# Patient Record
Sex: FEMALE | Race: BLACK OR AFRICAN AMERICAN | NOT HISPANIC OR LATINO
[De-identification: names, ages, dates, MRNs, and addresses within clinical notes are randomized per-mention and may not be internally consistent; named-entity substitution may affect disease eponyms.]

---

## 2017-02-16 ENCOUNTER — APPOINTMENT (OUTPATIENT)
Dept: OPHTHALMOLOGY | Facility: CLINIC | Age: 82
End: 2017-02-16

## 2017-02-16 ENCOUNTER — OUTPATIENT (OUTPATIENT)
Dept: OUTPATIENT SERVICES | Facility: HOSPITAL | Age: 82
LOS: 1 days | End: 2017-02-16

## 2017-02-16 DIAGNOSIS — H26.491 OTHER SECONDARY CATARACT, RIGHT EYE: ICD-10-CM

## 2019-07-30 ENCOUNTER — APPOINTMENT (OUTPATIENT)
Dept: HEART AND VASCULAR | Facility: CLINIC | Age: 84
End: 2019-07-30
Payer: MEDICARE

## 2019-07-30 ENCOUNTER — APPOINTMENT (OUTPATIENT)
Dept: HEART AND VASCULAR | Facility: CLINIC | Age: 84
End: 2019-07-30

## 2019-07-30 VITALS
OXYGEN SATURATION: 97 % | TEMPERATURE: 98.7 F | HEIGHT: 60 IN | BODY MASS INDEX: 23.17 KG/M2 | WEIGHT: 118.01 LBS | HEART RATE: 66 BPM | RESPIRATION RATE: 12 BRPM | DIASTOLIC BLOOD PRESSURE: 64 MMHG | SYSTOLIC BLOOD PRESSURE: 124 MMHG

## 2019-07-30 DIAGNOSIS — I34.0 NONRHEUMATIC MITRAL (VALVE) INSUFFICIENCY: ICD-10-CM

## 2019-07-30 DIAGNOSIS — I65.29 OCCLUSION AND STENOSIS OF UNSPECIFIED CAROTID ARTERY: ICD-10-CM

## 2019-07-30 DIAGNOSIS — Z01.419 ENCOUNTER FOR GYNECOLOGICAL EXAMINATION (GENERAL) (ROUTINE) W/OUT ABNORMAL FINDINGS: ICD-10-CM

## 2019-07-30 DIAGNOSIS — I10 ESSENTIAL (PRIMARY) HYPERTENSION: ICD-10-CM

## 2019-07-30 PROCEDURE — 93000 ELECTROCARDIOGRAM COMPLETE: CPT

## 2019-07-30 PROCEDURE — 93306 TTE W/DOPPLER COMPLETE: CPT

## 2019-07-30 PROCEDURE — 93880 EXTRACRANIAL BILAT STUDY: CPT

## 2019-07-30 PROCEDURE — 99204 OFFICE O/P NEW MOD 45 MIN: CPT

## 2019-07-30 RX ORDER — METOPROLOL TARTRATE 25 MG/1
25 TABLET, FILM COATED ORAL DAILY
Qty: 30 | Refills: 0 | Status: ACTIVE | COMMUNITY

## 2019-07-30 NOTE — PHYSICAL EXAM
[General Appearance - Well Developed] : well developed [Normal Appearance] : normal appearance [Well Groomed] : well groomed [General Appearance - Well Nourished] : well nourished [No Deformities] : no deformities [General Appearance - In No Acute Distress] : no acute distress [Normal Conjunctiva] : the conjunctiva exhibited no abnormalities [Heart Rate And Rhythm] : heart rate and rhythm were normal [Heart Sounds] : normal S1 and S2 [] : no respiratory distress [Exaggerated Use Of Accessory Muscles For Inspiration] : no accessory muscle use [Respiration, Rhythm And Depth] : normal respiratory rhythm and effort [Auscultation Breath Sounds / Voice Sounds] : lungs were clear to auscultation bilaterally [Bowel Sounds] : normal bowel sounds [Abdomen Soft] : soft [Abnormal Walk] : normal gait [Skin Turgor] : normal skin turgor [Oriented To Time, Place, And Person] : oriented to person, place, and time [Affect] : the affect was normal [Mood] : the mood was normal [No Anxiety] : not feeling anxious

## 2019-07-30 NOTE — DISCUSSION/SUMMARY
[FreeTextEntry1] : At the time of the patient's visit a Carotid Doppler was performed to evaluate for PVD\par \par At the time of the patient's visit an Echocardiogram was performed to evaluate her LV function. 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 I would suggest consideration of an ACE-inhibitor or ARB. Switching from Metoprolol Tartrate 25 mg once daily to 12.5 mg bid or Metoprolol Succinate 25 mg daily may be worth considering as well. A statin may be worthwhile

## 2019-07-30 NOTE — HISTORY OF PRESENT ILLNESS
[FreeTextEntry1] : 88 year female with memory deficits and glaucoma who was noted to have a slow heart beat by a visiting nurse. She comes with her attendant. She denies having any chest pain, SOB, dizziness, palpitations, orthopnea or PND. Her niece reports a HR of 45 BPM when checking her BP

## 2019-07-30 NOTE — REVIEW OF SYSTEMS
Eyes:  No visual changes  ENMT:  No hearing changes  Cardiac:  No chest pain, SOB   Respiratory:  No cough or respiratory distress  GI:  No nausea, vomiting, diarrhea or abdominal pain.  :  No dysuria, frequency or burning.  MS:  right lower back pain as per hpi, no hip pain no other joint pain  Neuro:  No headache  Skin:  No skin rash.   Endocrine: No history of thyroid disease or diabetes. [Negative] : Gastrointestinal

## 2019-09-19 ENCOUNTER — APPOINTMENT (OUTPATIENT)
Dept: HEART AND VASCULAR | Facility: CLINIC | Age: 84
End: 2019-09-19

## 2020-03-24 ENCOUNTER — EMERGENCY (EMERGENCY)
Facility: HOSPITAL | Age: 85
LOS: 1 days | Discharge: HOME | End: 2020-03-24
Attending: EMERGENCY MEDICINE | Admitting: EMERGENCY MEDICINE
Payer: MEDICARE

## 2020-03-24 VITALS
OXYGEN SATURATION: 97 % | HEART RATE: 65 BPM | DIASTOLIC BLOOD PRESSURE: 78 MMHG | RESPIRATION RATE: 16 BRPM | TEMPERATURE: 98 F | SYSTOLIC BLOOD PRESSURE: 124 MMHG

## 2020-03-24 VITALS
DIASTOLIC BLOOD PRESSURE: 75 MMHG | TEMPERATURE: 98 F | SYSTOLIC BLOOD PRESSURE: 129 MMHG | OXYGEN SATURATION: 97 % | HEART RATE: 68 BPM | RESPIRATION RATE: 17 BRPM

## 2020-03-24 DIAGNOSIS — M25.512 PAIN IN LEFT SHOULDER: ICD-10-CM

## 2020-03-24 DIAGNOSIS — I10 ESSENTIAL (PRIMARY) HYPERTENSION: ICD-10-CM

## 2020-03-24 PROCEDURE — 99283 EMERGENCY DEPT VISIT LOW MDM: CPT

## 2020-03-24 PROCEDURE — 99284 EMERGENCY DEPT VISIT MOD MDM: CPT

## 2020-03-24 PROCEDURE — 93010 ELECTROCARDIOGRAM REPORT: CPT

## 2020-03-24 PROCEDURE — 93005 ELECTROCARDIOGRAM TRACING: CPT

## 2020-03-24 RX ORDER — IBUPROFEN 200 MG
600 TABLET ORAL ONCE
Refills: 0 | Status: COMPLETED | OUTPATIENT
Start: 2020-03-24 | End: 2020-03-24

## 2020-03-24 RX ORDER — IBUPROFEN 200 MG
1 TABLET ORAL
Qty: 21 | Refills: 0
Start: 2020-03-24 | End: 2020-03-30

## 2020-03-24 RX ADMIN — Medication 600 MILLIGRAM(S): at 12:35

## 2020-03-24 NOTE — ED PROVIDER NOTE - NSFOLLOWUPINSTRUCTIONS_ED_ALL_ED_FT
Shoulder Pain  Many things can cause shoulder pain, including:  An injury to the shoulder.Overuse of the shoulder.Arthritis.The source of the pain can be:  Inflammation.An injury to the shoulder joint.An injury to a tendon, ligament, or bone.Follow these instructions at home:  Pay attention to changes in your symptoms. Let your health care provider know about them. Follow these instructions to relieve your pain.  If you have a sling:     Wear the sling as told by your health care provider. Remove it only as told by your health care provider. Loosen the sling if your fingers tingle, become numb, or turn cold and blue.Keep the sling clean.If the sling is not waterproof:  Do not let it get wet. Remove it to shower or bathe. Move your arm as little as possible, but keep your hand moving to prevent swelling.Managing pain, stiffness, and swelling        If directed, put ice on the painful area:  Put ice in a plastic bag.Place a towel between your skin and the bag.Leave the ice on for 20 minutes, 2–3 times per day. Stop applying ice if it does not help with the pain.Squeeze a soft ball or a foam pad as much as possible. This helps to keep the shoulder from swelling. It also helps to strengthen the arm.General instructions     Take over-the-counter and prescription medicines only as told by your health care provider.Keep all follow-up visits as told by your health care provider. This is important.Contact a health care provider if:  Your pain gets worse.Your pain is not relieved with medicines.New pain develops in your arm, hand, or fingers.Get help right away if:  Your arm, hand, or fingers:  Tingle.Become numb.Become swollen.Become painful.Turn white or blue.Summary  Shoulder pain can be caused by an injury, overuse, or arthritis.Pay attention to changes in your symptoms. Let your health care provider know about them.This condition may be treated with a sling, ice, and pain medicines.Contact your health care provider if the pain gets worse or new pain develops. Get help right away if your arm, hand, or fingers tingle or become numb, swollen, or painful.Keep all follow-up visits as told by your health care provider. This is important.This information is not intended to replace advice given to you by your health care provider. Make sure you discuss any questions you have with your health care provider.    Document Released: 09/27/2006 Document Revised: 07/02/2019 Document Reviewed: 07/02/2019  Elsevier Interactive Patient Education © 2020 [employer]

## 2020-03-24 NOTE — ED PROVIDER NOTE - CLINICAL SUMMARY MEDICAL DECISION MAKING FREE TEXT BOX
88 y/o F with a PMHx of HTN who is right hand dominant is present with atraumatic left arm pain x2 weeks. Pt given Motrin for pain relief. Ordered EKG, will reassess. 89 y/o F with a PMHx of HTN who is right hand dominant is present with atraumatic left arm pain x2 weeks. Pt given Motrin for pain relief. doubt acs, ekg with nsr without cardiac symptoms. no fall/injury, doubt fx, likely msk vs arthritis vs shoulder bursitis with mild pain with range of motion. Treated with motrin with improvement.

## 2020-03-24 NOTE — ED ADULT NURSE NOTE - NSIMPLEMENTINTERV_GEN_ALL_ED
Implemented All Fall with Harm Risk Interventions:  [place] to call system. Call bell, personal items and telephone within reach. Instruct patient to call for assistance. Room bathroom lighting operational. Non-slip footwear when patient is off stretcher. Physically safe environment: no spills, clutter or unnecessary equipment. Stretcher in lowest position, wheels locked, appropriate side rails in place. Provide visual cue, wrist band, yellow gown, etc. Monitor gait and stability. Monitor for mental status changes and reorient to person, place, and time. Review medications for side effects contributing to fall risk. Reinforce activity limits and safety measures with patient and family. Provide visual clues: red socks.

## 2020-03-24 NOTE — ED PROVIDER NOTE - OBJECTIVE STATEMENT
89 y/o female with a PMHx of HTN who is right hand dominant is present with atraumatic left arm pain x2 weeks. Pt states the pain is located on the joints of her left arm. It is located on the wrist, elbow and shoulder. Pain is mild at rest, however increases with movement. She describes a constant ache. She self-medicated with tylenol without improvement of her pain. She denies the following: fever, chills, chest pain, sob, difficulty breathing, back pain, fall, injury, weakness.

## 2020-03-24 NOTE — ED PROVIDER NOTE - PATIENT PORTAL LINK FT
You can access the FollowMyHealth Patient Portal offered by [facility] by registering at the following website: [email or web address]. By joining [employer]’s FollowMyHealth portal, you will also be able to view your health information using other applications (apps) compatible with our system.

## 2020-03-24 NOTE — ED ADULT TRIAGE NOTE - OTHER COMPLAINTS
pt presents s/p fall on sun. denies head trauma. c.o L shoulder pain with limited rom. aide is waiting in the waiting room.

## 2020-03-24 NOTE — ED ADULT NURSE NOTE - OBJECTIVE STATEMENT
pt 89 y/o female arrived to ED for chief complaint of left shoulder pain. Pt arrived with HHA. Pt states she fell  2 days ago,  denied loc or head trauma. pt exacerbated with movement.

## 2020-03-24 NOTE — ED PROVIDER NOTE - LOCATION
L arm shoulder/GOOD ROM, SOFT COMPARTMENT, MOTOR FUNCTION AND NEURO INTACT WITH 2+ PULSE AND GOOD CAP REFILL WITH + BEER CAN SIGNS, NEGATIVE WALLER

## 2020-03-24 NOTE — ED PROVIDER NOTE - ATTENDING CONTRIBUTION TO CARE
88 y/o F with PMH of HTN, right hand dominant, presents with atraumatic left arm pain x2 weeks. Pt states the pain is located on the joints of her left arm in the wrist, elbow and shoulder. Pain is mild at rest, however increases with movement and she describes a constant ache. She self-medicated with tylenol without improvement of her pain. She denies the following: fever, chills, chest pain, sob, difficulty breathing, back pain, fall, injury, weakness.  88 y/o F with a PMHx of HTN who is right hand dominant is present with atraumatic left arm pain x2 weeks. Pt given Motrin for pain relief. doubt acs, ekg with nsr without cardiac symptoms. no fall/injury, doubt fx, likely msk vs arthritis vs shoulder bursitis with mild pain with range of motion. Treated with motrin with improvement. 89 y/o F with PMH of HTN, right hand dominant, presents with atraumatic left arm pain x 2 weeks. Pt states the pain is located in the joints of her left arm in the wrist, elbow and shoulder. Pain is mild at rest, but however increases with movement and she describes it as a constant ache. She self-medicated with Tylenol without improvement of her pain. She denies the following: fevers, cough, chills, chest pain, sob, difficulty breathing, back pain, fall, injury, weakness, falls or trauma. Pt AAO, NAD, RRR, CTA b/l, abd: soft and NT. FROM of LUE with some pain, no swelling or bony TTP. Pt given Motrin for pain relief. doubt acs, ekg with nsr, without cardiac symptoms. Sxs likely sec to MSK etiology/ arthritis. To f/up outpt.

## 2020-05-15 ENCOUNTER — EMERGENCY (EMERGENCY)
Facility: HOSPITAL | Age: 85
LOS: 1 days | Discharge: HOME | End: 2020-05-15
Attending: EMERGENCY MEDICINE | Admitting: EMERGENCY MEDICINE
Payer: MEDICARE

## 2020-05-15 VITALS
OXYGEN SATURATION: 100 % | SYSTOLIC BLOOD PRESSURE: 132 MMHG | DIASTOLIC BLOOD PRESSURE: 66 MMHG | HEART RATE: 60 BPM | TEMPERATURE: 100 F | RESPIRATION RATE: 18 BRPM

## 2020-05-15 VITALS
TEMPERATURE: 98 F | DIASTOLIC BLOOD PRESSURE: 70 MMHG | RESPIRATION RATE: 18 BRPM | SYSTOLIC BLOOD PRESSURE: 127 MMHG | OXYGEN SATURATION: 100 % | HEART RATE: 55 BPM

## 2020-05-15 PROCEDURE — 99284 EMERGENCY DEPT VISIT MOD MDM: CPT

## 2020-05-15 PROCEDURE — 99284 EMERGENCY DEPT VISIT MOD MDM: CPT | Mod: 25

## 2020-05-15 PROCEDURE — 93971 EXTREMITY STUDY: CPT | Mod: 26,LT

## 2020-05-15 PROCEDURE — 73502 X-RAY EXAM HIP UNI 2-3 VIEWS: CPT

## 2020-05-15 PROCEDURE — 73502 X-RAY EXAM HIP UNI 2-3 VIEWS: CPT | Mod: 26,LT

## 2020-05-15 PROCEDURE — 93971 EXTREMITY STUDY: CPT

## 2020-05-15 NOTE — ED PROVIDER NOTE - PATIENT PORTAL LINK FT
You can access the FollowMyHealth Patient Portal offered by [facility] by registering at the following website: [email or web address]. By joining [email or web address]’s FollowMyHealth portal, you will also be able to view your health information using other applications (apps) compatible with our system.

## 2020-05-15 NOTE — ED PROVIDER NOTE - MUSCULOSKELETAL, MLM
Spine appears normal, range of motion is not limited. Point tenderness to L hip. No swelling. No deformities. No midline spine tenderness. Strength 5/5 b/l to lower ext. dp/pt pulse 2+

## 2020-05-15 NOTE — ED PROVIDER NOTE - CLINICAL SUMMARY MEDICAL DECISION MAKING FREE TEXT BOX
89 y/o F with PMHx of HTN p/w atraumatic L leg and hip pain x1 week. Able to bare weight. No weakness on exam. Will obtain xray and US to r/o DVT. Plan for out-pt f/u

## 2020-05-15 NOTE — ED ADULT NURSE NOTE - OBJECTIVE STATEMENT
Pt. presents to the ED c/o intermittent pain to the left hip, thigh, and knee x approx 3 weeks. Pt. denies injury or all to the area- Pt. presents with + cmst's to the limb and free and fluid movement. Pt. pending imaging and continues to deny pain at this time.

## 2020-05-15 NOTE — ED PROVIDER NOTE - ATTENDING CONTRIBUTION TO CARE
88 y/o F with PMHx of HTN presents to the ED c/o atraumatic L hip and leg pain x1 week. Pain to the L hip moving to L thigh. No pain to foot. Took Tylenol with some improvements. Denies numbness, weakness, or trauma. Discussed with home health aid  Meka ([phone]). Aid states pt's pain is intermittent having pain one day and no pain in other days. Aid states pt leg is hurting today thus prompting ED visit for eval. As per Aid, pt did not fall.    Agree with HPI and PE as documented by PA    PT with imaging in ED neg for acute dvt or fracture  Pain most likely secondary to arthritis  Ambulating in ED and therefore do not feel ct necessary - also pt without trauma  Discussed with aid who will accompany pt home  Aware of return precautions

## 2020-05-15 NOTE — ED PROVIDER NOTE - OBJECTIVE STATEMENT
89 y/o F with PMHx of HTN presents to the ED c/o atraumatic L hip and leg pain x1 week. Pain to the L hip moving to L thigh. No pain to foot. Took Tylenol with some improvements. Denies numbness, weakness, or trauma. Discussed with home health aid  Meka ([phone]). Aid states pt's pain is intermittent having pain one day and no pain in other days. Aid states pt leg is hurting today thus prompting ED visit for eval. As per Aid, pt did not fall.

## 2020-05-15 NOTE — ED ADULT NURSE NOTE - CINV DISCH TEACH PARTICIP
Caregiver/PA Jennie discussed discharge care with home health-aide in [place]- instructions discussed with pt. by this RN/Patient

## 2020-05-19 DIAGNOSIS — M79.605 PAIN IN LEFT LEG: ICD-10-CM

## 2020-05-19 DIAGNOSIS — M25.552 PAIN IN LEFT HIP: ICD-10-CM

## 2020-05-29 ENCOUNTER — INPATIENT (INPATIENT)
Facility: HOSPITAL | Age: 85
LOS: 3 days | Discharge: NURSING, REHAB OR LONG-TERM CARE | DRG: 682 | End: 2020-06-02
Payer: MEDICARE

## 2020-05-29 VITALS
HEIGHT: 62 IN | TEMPERATURE: 98 F | DIASTOLIC BLOOD PRESSURE: 68 MMHG | OXYGEN SATURATION: 100 % | WEIGHT: 119.93 LBS | HEART RATE: 84 BPM | SYSTOLIC BLOOD PRESSURE: 115 MMHG | RESPIRATION RATE: 17 BRPM

## 2020-05-29 DIAGNOSIS — I34.0 NONRHEUMATIC MITRAL (VALVE) INSUFFICIENCY: ICD-10-CM

## 2020-05-29 DIAGNOSIS — E86.9 VOLUME DEPLETION, UNSPECIFIED: ICD-10-CM

## 2020-05-29 DIAGNOSIS — R79.89 OTHER SPECIFIED ABNORMAL FINDINGS OF BLOOD CHEMISTRY: ICD-10-CM

## 2020-05-29 DIAGNOSIS — R63.8 OTHER SYMPTOMS AND SIGNS CONCERNING FOOD AND FLUID INTAKE: ICD-10-CM

## 2020-05-29 DIAGNOSIS — H40.9 UNSPECIFIED GLAUCOMA: ICD-10-CM

## 2020-05-29 DIAGNOSIS — F03.90 UNSPECIFIED DEMENTIA WITHOUT BEHAVIORAL DISTURBANCE: ICD-10-CM

## 2020-05-29 DIAGNOSIS — R53.1 WEAKNESS: ICD-10-CM

## 2020-05-29 DIAGNOSIS — I10 ESSENTIAL (PRIMARY) HYPERTENSION: ICD-10-CM

## 2020-05-29 LAB
ALBUMIN SERPL ELPH-MCNC: 3.6 G/DL — SIGNIFICANT CHANGE UP (ref 3.3–5)
ALBUMIN SERPL ELPH-MCNC: 3.6 G/DL — SIGNIFICANT CHANGE UP (ref 3.3–5)
ALBUMIN SERPL ELPH-MCNC: 3.7 G/DL — SIGNIFICANT CHANGE UP (ref 3.3–5)
ALP SERPL-CCNC: 52 U/L — SIGNIFICANT CHANGE UP (ref 40–120)
ALP SERPL-CCNC: 52 U/L — SIGNIFICANT CHANGE UP (ref 40–120)
ALP SERPL-CCNC: 54 U/L — SIGNIFICANT CHANGE UP (ref 40–120)
ALT FLD-CCNC: 13 U/L — SIGNIFICANT CHANGE UP (ref 10–45)
ALT FLD-CCNC: SIGNIFICANT CHANGE UP U/L (ref 10–45)
ALT FLD-CCNC: SIGNIFICANT CHANGE UP U/L (ref 10–45)
ANION GAP SERPL CALC-SCNC: 16 MMOL/L — SIGNIFICANT CHANGE UP (ref 5–17)
ANION GAP SERPL CALC-SCNC: 16 MMOL/L — SIGNIFICANT CHANGE UP (ref 5–17)
ANION GAP SERPL CALC-SCNC: 17 MMOL/L — SIGNIFICANT CHANGE UP (ref 5–17)
APPEARANCE UR: CLEAR — SIGNIFICANT CHANGE UP
APTT BLD: 23.7 SEC — LOW (ref 27.5–36.3)
AST SERPL-CCNC: 27 U/L — SIGNIFICANT CHANGE UP (ref 10–40)
AST SERPL-CCNC: SIGNIFICANT CHANGE UP U/L (ref 10–40)
AST SERPL-CCNC: SIGNIFICANT CHANGE UP U/L (ref 10–40)
BACTERIA # UR AUTO: PRESENT /HPF
BASOPHILS # BLD AUTO: 0.04 K/UL — SIGNIFICANT CHANGE UP (ref 0–0.2)
BASOPHILS NFR BLD AUTO: 0.4 % — SIGNIFICANT CHANGE UP (ref 0–2)
BILIRUB SERPL-MCNC: 0.9 MG/DL — SIGNIFICANT CHANGE UP (ref 0.2–1.2)
BILIRUB SERPL-MCNC: 1 MG/DL — SIGNIFICANT CHANGE UP (ref 0.2–1.2)
BILIRUB SERPL-MCNC: 1.1 MG/DL — SIGNIFICANT CHANGE UP (ref 0.2–1.2)
BILIRUB UR-MCNC: NEGATIVE — SIGNIFICANT CHANGE UP
BUN SERPL-MCNC: 39 MG/DL — HIGH (ref 7–23)
BUN SERPL-MCNC: 39 MG/DL — HIGH (ref 7–23)
BUN SERPL-MCNC: 40 MG/DL — HIGH (ref 7–23)
CALCIUM SERPL-MCNC: 10 MG/DL — SIGNIFICANT CHANGE UP (ref 8.4–10.5)
CALCIUM SERPL-MCNC: 10 MG/DL — SIGNIFICANT CHANGE UP (ref 8.4–10.5)
CALCIUM SERPL-MCNC: 10.3 MG/DL — SIGNIFICANT CHANGE UP (ref 8.4–10.5)
CHLORIDE SERPL-SCNC: 105 MMOL/L — SIGNIFICANT CHANGE UP (ref 96–108)
CHLORIDE SERPL-SCNC: 107 MMOL/L — SIGNIFICANT CHANGE UP (ref 96–108)
CHLORIDE SERPL-SCNC: 108 MMOL/L — SIGNIFICANT CHANGE UP (ref 96–108)
CK MB CFR SERPL CALC: 6.3 NG/ML — SIGNIFICANT CHANGE UP (ref 0–6.7)
CK SERPL-CCNC: 355 U/L — HIGH (ref 25–170)
CK SERPL-CCNC: 417 U/L — HIGH (ref 25–170)
CO2 SERPL-SCNC: 17 MMOL/L — LOW (ref 22–31)
CO2 SERPL-SCNC: 19 MMOL/L — LOW (ref 22–31)
CO2 SERPL-SCNC: 19 MMOL/L — LOW (ref 22–31)
COLOR SPEC: YELLOW — SIGNIFICANT CHANGE UP
CREAT SERPL-MCNC: 1.75 MG/DL — HIGH (ref 0.5–1.3)
CREAT SERPL-MCNC: 1.76 MG/DL — HIGH (ref 0.5–1.3)
CREAT SERPL-MCNC: 1.82 MG/DL — HIGH (ref 0.5–1.3)
DIFF PNL FLD: NEGATIVE — SIGNIFICANT CHANGE UP
EOSINOPHIL # BLD AUTO: 0.04 K/UL — SIGNIFICANT CHANGE UP (ref 0–0.5)
EOSINOPHIL NFR BLD AUTO: 0.4 % — SIGNIFICANT CHANGE UP (ref 0–6)
EPI CELLS # UR: SIGNIFICANT CHANGE UP /HPF (ref 0–5)
GLUCOSE SERPL-MCNC: 112 MG/DL — HIGH (ref 70–99)
GLUCOSE SERPL-MCNC: 114 MG/DL — HIGH (ref 70–99)
GLUCOSE SERPL-MCNC: 115 MG/DL — HIGH (ref 70–99)
GLUCOSE UR QL: NEGATIVE — SIGNIFICANT CHANGE UP
HCT VFR BLD CALC: 42.3 % — SIGNIFICANT CHANGE UP (ref 34.5–45)
HGB BLD-MCNC: 13.9 G/DL — SIGNIFICANT CHANGE UP (ref 11.5–15.5)
HYALINE CASTS # UR AUTO: ABNORMAL /LPF (ref 0–2)
IMM GRANULOCYTES NFR BLD AUTO: 0.6 % — SIGNIFICANT CHANGE UP (ref 0–1.5)
INR BLD: 1.03 — SIGNIFICANT CHANGE UP (ref 0.88–1.16)
KETONES UR-MCNC: ABNORMAL MG/DL
LEUKOCYTE ESTERASE UR-ACNC: NEGATIVE — SIGNIFICANT CHANGE UP
LYMPHOCYTES # BLD AUTO: 1.25 K/UL — SIGNIFICANT CHANGE UP (ref 1–3.3)
LYMPHOCYTES # BLD AUTO: 12.1 % — LOW (ref 13–44)
MAGNESIUM SERPL-MCNC: 2.2 MG/DL — SIGNIFICANT CHANGE UP (ref 1.6–2.6)
MCHC RBC-ENTMCNC: 30.5 PG — SIGNIFICANT CHANGE UP (ref 27–34)
MCHC RBC-ENTMCNC: 32.9 GM/DL — SIGNIFICANT CHANGE UP (ref 32–36)
MCV RBC AUTO: 93 FL — SIGNIFICANT CHANGE UP (ref 80–100)
MONOCYTES # BLD AUTO: 0.44 K/UL — SIGNIFICANT CHANGE UP (ref 0–0.9)
MONOCYTES NFR BLD AUTO: 4.2 % — SIGNIFICANT CHANGE UP (ref 2–14)
NEUTROPHILS # BLD AUTO: 8.53 K/UL — HIGH (ref 1.8–7.4)
NEUTROPHILS NFR BLD AUTO: 82.3 % — HIGH (ref 43–77)
NITRITE UR-MCNC: NEGATIVE — SIGNIFICANT CHANGE UP
NRBC # BLD: 0 /100 WBCS — SIGNIFICANT CHANGE UP (ref 0–0)
PH UR: 6 — SIGNIFICANT CHANGE UP (ref 5–8)
PLATELET # BLD AUTO: 160 K/UL — SIGNIFICANT CHANGE UP (ref 150–400)
POTASSIUM SERPL-MCNC: 5.3 MMOL/L — SIGNIFICANT CHANGE UP (ref 3.5–5.3)
POTASSIUM SERPL-MCNC: SIGNIFICANT CHANGE UP MMOL/L (ref 3.5–5.3)
POTASSIUM SERPL-MCNC: SIGNIFICANT CHANGE UP MMOL/L (ref 3.5–5.3)
POTASSIUM SERPL-SCNC: 5.3 MMOL/L — SIGNIFICANT CHANGE UP (ref 3.5–5.3)
POTASSIUM SERPL-SCNC: SIGNIFICANT CHANGE UP MMOL/L (ref 3.5–5.3)
POTASSIUM SERPL-SCNC: SIGNIFICANT CHANGE UP MMOL/L (ref 3.5–5.3)
PROT SERPL-MCNC: 7.3 G/DL — SIGNIFICANT CHANGE UP (ref 6–8.3)
PROT UR-MCNC: ABNORMAL MG/DL
PROTHROM AB SERPL-ACNC: 11.8 SEC — SIGNIFICANT CHANGE UP (ref 10–12.9)
RBC # BLD: 4.55 M/UL — SIGNIFICANT CHANGE UP (ref 3.8–5.2)
RBC # FLD: 13.3 % — SIGNIFICANT CHANGE UP (ref 10.3–14.5)
RBC CASTS # UR COMP ASSIST: < 5 /HPF — SIGNIFICANT CHANGE UP
SARS-COV-2 RNA SPEC QL NAA+PROBE: SIGNIFICANT CHANGE UP
SODIUM SERPL-SCNC: 141 MMOL/L — SIGNIFICANT CHANGE UP (ref 135–145)
SODIUM SERPL-SCNC: 141 MMOL/L — SIGNIFICANT CHANGE UP (ref 135–145)
SODIUM SERPL-SCNC: 142 MMOL/L — SIGNIFICANT CHANGE UP (ref 135–145)
SP GR SPEC: >=1.03 — SIGNIFICANT CHANGE UP (ref 1–1.03)
TROPONIN T SERPL-MCNC: 0.02 NG/ML — HIGH (ref 0–0.01)
TROPONIN T SERPL-MCNC: <0.01 NG/ML — SIGNIFICANT CHANGE UP (ref 0–0.01)
UROBILINOGEN FLD QL: 0.2 E.U./DL — SIGNIFICANT CHANGE UP
WBC # BLD: 10.36 K/UL — SIGNIFICANT CHANGE UP (ref 3.8–10.5)
WBC # FLD AUTO: 10.36 K/UL — SIGNIFICANT CHANGE UP (ref 3.8–10.5)
WBC UR QL: < 5 /HPF — SIGNIFICANT CHANGE UP

## 2020-05-29 PROCEDURE — 72170 X-RAY EXAM OF PELVIS: CPT | Mod: 26

## 2020-05-29 PROCEDURE — 71045 X-RAY EXAM CHEST 1 VIEW: CPT | Mod: 26,59

## 2020-05-29 PROCEDURE — 99223 1ST HOSP IP/OBS HIGH 75: CPT | Mod: GC

## 2020-05-29 PROCEDURE — 71046 X-RAY EXAM CHEST 2 VIEWS: CPT | Mod: 26

## 2020-05-29 PROCEDURE — 93010 ELECTROCARDIOGRAM REPORT: CPT

## 2020-05-29 PROCEDURE — 99285 EMERGENCY DEPT VISIT HI MDM: CPT

## 2020-05-29 PROCEDURE — 70450 CT HEAD/BRAIN W/O DYE: CPT | Mod: 26

## 2020-05-29 RX ORDER — METOPROLOL TARTRATE 50 MG
25 TABLET ORAL DAILY
Refills: 0 | Status: DISCONTINUED | OUTPATIENT
Start: 2020-05-29 | End: 2020-05-29

## 2020-05-29 RX ORDER — SODIUM CHLORIDE 9 MG/ML
1000 INJECTION INTRAMUSCULAR; INTRAVENOUS; SUBCUTANEOUS
Refills: 0 | Status: DISCONTINUED | OUTPATIENT
Start: 2020-05-29 | End: 2020-05-30

## 2020-05-29 RX ORDER — ENOXAPARIN SODIUM 100 MG/ML
30 INJECTION SUBCUTANEOUS EVERY 24 HOURS
Refills: 0 | Status: DISCONTINUED | OUTPATIENT
Start: 2020-05-29 | End: 2020-06-02

## 2020-05-29 RX ORDER — SODIUM CHLORIDE 9 MG/ML
1000 INJECTION INTRAMUSCULAR; INTRAVENOUS; SUBCUTANEOUS
Refills: 0 | Status: DISCONTINUED | OUTPATIENT
Start: 2020-05-29 | End: 2020-05-29

## 2020-05-29 RX ORDER — METOPROLOL TARTRATE 50 MG
25 TABLET ORAL
Qty: 0 | Refills: 0 | DISCHARGE

## 2020-05-29 RX ADMIN — ENOXAPARIN SODIUM 30 MILLIGRAM(S): 100 INJECTION SUBCUTANEOUS at 21:48

## 2020-05-29 RX ADMIN — SODIUM CHLORIDE 70 MILLILITER(S): 9 INJECTION INTRAMUSCULAR; INTRAVENOUS; SUBCUTANEOUS at 19:38

## 2020-05-29 RX ADMIN — SODIUM CHLORIDE 120 MILLILITER(S): 9 INJECTION INTRAMUSCULAR; INTRAVENOUS; SUBCUTANEOUS at 14:09

## 2020-05-29 NOTE — H&P ADULT - PROBLEM SELECTOR PLAN 3
per HHA has been progressive over unclear amount of time  no reported falls/trauma  5/5 strength in all extremities   PT consult in AM

## 2020-05-29 NOTE — PHYSICAL THERAPY INITIAL EVALUATION ADULT - CRITERIA FOR SKILLED THERAPEUTIC INTERVENTIONS
therapy frequency/risk reduction/prevention/anticipated discharge recommendation/impairments found/functional limitations in following categories/rehab potential

## 2020-05-29 NOTE — H&P ADULT - PROBLEM SELECTOR PLAN 10
F: NS at 70 x 5 hours  N: pureed   DVT: lovenox  FULL CODE (unable to get answer from pt but niece states patient wants to live to 100 is unaware of any DNR/DNI)  DISPO: [facility]

## 2020-05-29 NOTE — ED PROVIDER NOTE - NEUROLOGICAL, MLM
Alert and oriented, no focal deficits, no motor or sensory deficits. CN'II-XII intact, no pronator drift, nl finger to nose, clear speech.

## 2020-05-29 NOTE — ED PROVIDER NOTE - OBJECTIVE STATEMENT
Pt is an 89 y/o F sent to the ED after she was found on her knees by a healthcare aide when she came in to the home this morning. The aide was concerned the pt might have fallen. When pt was asked why she was on her knees, she reports she was saying her daily prayers on her knees, which she does every morning. Denies fall. Pt reports feeling well. Pt reports h/o chronic dizziness w/ walking for the past 6 years. Also reports h/o chronic bilateral shoulder tightness but is unsure of how long she has had this. Denies headache, cough, chest pain, SOB, nausea, vomiting, or diarrhea. Pt is an 88 y/o F sent to the ED after she was found on her knees by a healthcare aide when she came in to the home this morning with inability to get up. The aide was concerned the pt might have fallen. When pt was asked why she was on her knees, she reports she was saying her daily prayers on her knees, which she does every morning. Denies fall. Pt reports feeling well. Pt reports h/o chronic dizziness w/ walking for the past 6 years. Also reports h/o chronic bilateral shoulder tightness but is unsure of how long she has had this. Denies headache, cough, chest pain, SOB, nausea, vomiting, or diarrhea. pt unsure if she has been eating and drinking well lately, endorses dry mouth

## 2020-05-29 NOTE — H&P ADULT - ASSESSMENT
88 y/o F PMH moderate MR, mild dementia, glaucoma who presented with generalized weakness, admitted for hydration and assessment of safety of living alone.

## 2020-05-29 NOTE — H&P ADULT - HISTORY OF PRESENT ILLNESS
88 y/o F PMH moderate MR, mild dementia who presented from home after her aide found her on her knees and patient had difficulty getting up. Patient states she was in daily prayer and denies fall/trauma. 89 y/o F PMH moderate MR, mild dementia who presented from home after her aide found her on her knees and patient had difficulty getting up. Patient states she was in daily prayer and denies fall/trauma. Patient states she takes one pill for blood pressure but does not know the name. Does not feel weak or ill at this time. Limited ability to interview due to dementia but cannot elicit any complaints. Home health aide that found her is not 89 y/o F PMH moderate MR, mild dementia, HTN who presented from home after her aide found her on her knees and patient had difficulty getting up. Patient states she was in daily prayer and denies fall/trauma. Patient states she takes one pill for blood pressure but does not know the name. Does not feel weak or ill at this time. Limited ability to interview due to dementia but cannot elicit any complaints. Was able to speak to home health aide (Ms. Houser, [phone]) who states the patient has been getting progressively weaker and more demented over time but no specific illness/events.    ED vitals: afebrile, p 84, 115/68, RR 17, 100% on RA  ED labs: Cr 1.82, BUN 39, trop .02 -> .01, trace ketones in urine  CT head: no acute findings  CXR: Bilateral interstitial lung disease  Started on NS @ 120 88 y/o F PMH moderate MR, mild dementia, HTN who presented from home after her aide found her on her knees and patient had difficulty getting up. Patient states she was in daily prayer and denies fall/trauma. Patient states she takes one pill for blood pressure but does not know the name. Does not feel weak or ill at this time. Limited ability to interview due to dementia but cannot elicit any complaints. Was able to speak to home health aide (Ms. Houser, [phone]) who states the patient has been getting progressively weaker and more demented over time but no specific illness/events.    ED vitals: afebrile, p 84, 115/68, RR 17, 100% on RA  ED labs: Cr 1.82, BUN 39, trop .02 -> .01, trace ketones in urine  CT head: no acute findings  CXR: Bilateral interstitial lung disease  EKG: NSR at 68  Started on NS @ 120

## 2020-05-29 NOTE — ED PROVIDER NOTE - PMH
PPD placed and confirm can return on 10/4 for reading  Continue rescue inhaler as pretreatment before going running as you have been doing well with this  We did review importance of getting flu vaccine and October being the ideal month  You are aware you can come for a nurse visit, during or flu clinics or you may receive at your pharmacy 
Glaucoma    Hypertension    Shingles

## 2020-05-29 NOTE — H&P ADULT - PROBLEM SELECTOR PLAN 5
moderate per echo in allscripts 2019  increases risk for fluid overload -- monitor volume status closely .02 on admission --> .01  no chest pain or ischemic changes on ekg  may be in setting of renal insuffiency

## 2020-05-29 NOTE — H&P ADULT - PROBLEM SELECTOR PLAN 8
from talking to niece/ HHA appears to be at baseline  CT no acute findings  AO x 2-3  B12, TSH in AM from talking to niece/ HHA appears to be at baseline  CT no acute findings -- chronic microvascular disease/ chronic cerebellar infarct  AO x 2-3  B12, TSH in AM

## 2020-05-29 NOTE — H&P ADULT - PROBLEM SELECTOR PLAN 4
home med metoprolol tartrate 25 mg daily   unclear reason patient on this med given minimal effect on BP and not typically dosed daily  will continue to avoid rebound tachycardia and obtain collateral if possible home med metoprolol tartrate 25 mg daily   unclear reason patient on this med given minimal effect on BP and not typically dosed daily  hold for now, will check orthostatics and monitor BP/HR

## 2020-05-29 NOTE — ED PROVIDER NOTE - CONSTITUTIONAL, MLM
normal... Elderly appearing, awake, alert, oriented to person, place, time/situation and in no apparent distress. Elderly appearing, awake, alert, oriented to person, place disoriented to year ,  and in no apparent distress.

## 2020-05-29 NOTE — H&P ADULT - NSHPLABSRESULTS_GEN_ALL_CORE
LABS:                         13.9   10.36 )-----------( 160      ( 29 May 2020 10:47 )             42.3     05-29    142  |  107  |  40<H>  ----------------------------<  115<H>  5.3   |  19<L>  |  1.76<H>    Ca    10.0      29 May 2020 14:48  Mg     2.2     05-29    TPro  7.3  /  Alb  3.7  /  TBili  1.0  /  DBili  x   /  AST  27  /  ALT  13  /  AlkPhos  54  05-29    PT/INR - ( 29 May 2020 10:47 )   PT: 11.8 sec;   INR: 1.03          PTT - ( 29 May 2020 10:47 )  PTT:23.7 sec  Urinalysis Basic - ( 29 May 2020 13:09 )    Color: Yellow / Appearance: Clear / SG: >=1.030 / pH: x  Gluc: x / Ketone: Trace mg/dL  / Bili: Negative / Urobili: 0.2 E.U./dL   Blood: x / Protein: Trace mg/dL / Nitrite: NEGATIVE   Leuk Esterase: NEGATIVE / RBC: < 5 /HPF / WBC < 5 /HPF   Sq Epi: x / Non Sq Epi: 0-5 /HPF / Bacteria: Present /HPF      CARDIAC MARKERS ( 29 May 2020 13:33 )  x     / <0.01 ng/mL / x     / x     / x      CARDIAC MARKERS ( 29 May 2020 10:47 )  x     / 0.02 ng/mL / 417 U/L / x     / 6.3 ng/mL            RADIOLOGY, EKG & ADDITIONAL TESTS: Reviewed.

## 2020-05-29 NOTE — ED PROVIDER NOTE - PSYCHIATRIC, MLM
Alert and oriented to person, place, time/situation. normal mood and affect. no apparent risk to self or others. Alert and oriented to person, place,disoriented to year . normal mood and affect. no apparent risk to self or others.

## 2020-05-29 NOTE — H&P ADULT - NSICDXPASTSURGICALHX_GEN_ALL_CORE_FT
PAST SURGICAL HISTORY:  No significant past surgical history PAST SURGICAL HISTORY:  H/O eye surgery

## 2020-05-29 NOTE — ED ADULT NURSE NOTE - OBJECTIVE STATEMENT
Pt presents to ER with c/o found kneeling on ground this AM about 0800 by HHA. In ER, pt denies any physical complaints. Pt states she was kneeling to pray this morning and that she does this often. Pt denies difficulty breathing, CP, extremity pain, HA, nausea, vomiting. Pt is blind. Pt does complain that for 6 years she has been feeling dizziness when she sits up/walks. Pt appears dry via oral mucosa. Pt currently is oriented to person and place; pt is not oriented to time. PMH dementia.

## 2020-05-29 NOTE — H&P ADULT - PROBLEM SELECTOR PLAN 1
per HHA has not been eating much recently   per ED provider appeared dry on exam although now with MMM  also with trace ketones in urine and high specific gravity  gentle hydration   nutrition consult in AM (patient does not have dentures with her) per HHA has not been eating much recently   per ED provider appeared dry on exam although now with MMM  also with trace ketones in urine and high specific gravity  gentle hydration   nutrition consult in AM (patient does not have dentures with her)  check orthostatics

## 2020-05-29 NOTE — PHYSICAL THERAPY INITIAL EVALUATION ADULT - ORIENTATION, REHAB EVAL
Oriented to self but cannot accurately state the year she was born, knows she is at [facility] and why she is here. Knows the president is Chasidy and the month is May, though cannot state the year.

## 2020-05-29 NOTE — PHYSICAL THERAPY INITIAL EVALUATION ADULT - PERTINENT HX OF CURRENT PROBLEM, REHAB EVAL
89 year old female who presented from home after her aide found her on her knees and patient had difficulty getting up. Patient states she was in daily prayer and denies fall/trauma.

## 2020-05-29 NOTE — ED PROVIDER NOTE - ATTESTATION, MLM
Elvia TENA at bedside for initial admission assessment. Patient's daughter-in-law remains at beside. Patient placed on 2L supplemental O2 via NC per order. 1:53 PM  Cardiologist MD at bedside. Patient remains very restless, patient instructed to stay still and focus on good respirations. 2:02 PM  Echo at bedside. Additional PIV inserted by Brian CANO. 2:30 PM  Consent for cardiac catheterization completed and scanned into chart by ER . Daughter-in-law left bedside. Daughter-in-law asked to calm down as she is increasing patient's anxiety level. 2:40 PM  Patient to go to Cath Lab shortly, accepting [place], informed. I have reviewed and confirmed nurses' notes for patient's medications, allergies, medical history, and surgical history.

## 2020-05-29 NOTE — ED PROVIDER NOTE - CLINICAL SUMMARY MEDICAL DECISION MAKING FREE TEXT BOX
89 y/o F presents to the ED after being found on her knees this morning. Reports she was praying and did not fall. Suspect pt is likely accurate, however pt does have a h/o dementia. Will evaluate further for trauma, metabolic abnormality, or infection. Dispo pending workup and reevaluation. 88 y/o F presents to the ED after being found on her knees this morning. Reports she was praying and did not fall. Suspect pt is likely accurate, however pt does have a h/o dementia. Will evaluate further for trauma, metabolic abnormality, or infection. Dispo pending workup and reevaluation.    Reeval, creatinine 1.8 , consider ARF although do not have baseline creatinine, pt clinically dehydrated appearing, will need admission for rehydration, slow fluids ordered due to age, CMP repeat pending, initial hemolyzed, UA pending. no apparent injury on imaging , 89 y/o F presents to the ED after being found on her knees this morning. Reports she was praying and did not fall. Suspect pt is likely accurate, however pt does have a h/o dementia. Will evaluate further for trauma, metabolic abnormality, or infection. Dispo pending workup and reevaluation.    Reeval, creatinine 1.8 , consider ARF although do not have baseline creatinine, pt clinically dehydrated appearing, will need admission for rehydration, also reeval of home situation as reportedly pt only with day coverage and otherwise lives alone, pt with dementia slow fluids ordered due to age, CMP repeat pending, initial hemolyzed, UA pending. no apparent injury on imaging ,

## 2020-05-29 NOTE — ED ADULT NURSE REASSESSMENT NOTE - NS ED NURSE REASSESS COMMENT FT1
Handoff report received for primary RN break. Patient currently sleeping in stretcher, arousable by verbal stimulation, patient in no acute distress. Will continue to monitor patient.

## 2020-05-29 NOTE — H&P ADULT - PROBLEM SELECTOR PLAN 6
F: NS at 120  N: pureed   DVT: lovenox  FULL CODE (unable to get answer from pt but niece states patient wants to live to 89 is unaware of any DNR/DNI)  DISPO: [facility] moderate per echo in allscripts 2019  increases risk for fluid overload -- monitor volume status closely moderate per echo in allscripts 2019  increases risk for fluid overload -- monitor volume status closely  repeat echo to assess

## 2020-05-29 NOTE — H&P ADULT - PROBLEM SELECTOR PLAN 2
unknown baseline, too late to call PCP  CAYLA versus CKD 4  daily BMP, seems to be downtrending with fluids supporting pre-renal CAYLA unknown baseline, too late to call PCP  CAYLA versus CKD 4  daily BMP, seems to be downtrending with fluids supporting pre-renal CAYLA    ADDENDUM: Cr 1.17 in 2017; likely CAYLA, monitor renal function , send urine lytes

## 2020-05-29 NOTE — PHYSICAL THERAPY INITIAL EVALUATION ADULT - DIAGNOSIS, PT EVAL
4C: Impaired Muscle Performance; 5A: Primary Prevention/Risk Reduction for Loss of Balance and Falling

## 2020-05-29 NOTE — ED PROVIDER NOTE - ENMT, MLM
Airway patent, Nasal mucosa clear. Dry mucous membranes. Throat has no vesicles, no oropharyngeal exudates and uvula is midline. Airway patent, Nasal mucosa clear. DRY mucous membranes. Throat has no vesicles, no oropharyngeal exudates and uvula is midline.

## 2020-05-29 NOTE — ED ADULT NURSE NOTE - NSIMPLEMENTINTERV_GEN_ALL_ED
Implemented All Fall with Harm Risk Interventions:  [place] to call system. Call bell, personal items and telephone within reach. Instruct patient to call for assistance. Room bathroom lighting operational. Non-slip footwear when patient is off stretcher. Physically safe environment: no spills, clutter or unnecessary equipment. Stretcher in lowest position, wheels locked, appropriate side rails in place. Provide visual cue, wrist band, yellow gown, etc. Monitor gait and stability. Monitor for mental status changes and reorient to person, place, and time. Review medications for side effects contributing to fall risk. Reinforce activity limits and safety measures with patient and family. Provide visual clues: red socks.

## 2020-05-29 NOTE — H&P ADULT - NSHPSOCIALHISTORY_GEN_ALL_CORE
Lives alone with home health aide for part of the day. No significant history of smoking, alcohol, drug use. Lives alone with home health aide for part of the day. No significant history of smoking, alcohol, drug use. Niece assists in medical decision making but unclear if official HCP.

## 2020-05-29 NOTE — H&P ADULT - NSHPSOURCEINFORD_GEN_ALL_CORE
Chart(s)/Physician/Provider/Patient Home Health Aide or Other Non-Family Caregiver/Other Family Member/Patient/Chart(s)

## 2020-05-29 NOTE — H&P ADULT - NSHPPHYSICALEXAM_GEN_ALL_CORE
PHYSICAL EXAM:    Constitutional: WDWN, lying comfortably in bed, NAD  Head: Nc/At  Eyes: PERRL, EOMI, clear conjunctiva  ENT: no nasal discharge; uvula midline, no oropharyngeal erythema or exudates; MMM  Neck: supple; no JVD or thyromegaly  Respiratory: CTA b/l, no wheezes, rales, or rhonchi  Cardiac: +S1/S2, +RRR, no murmurs, rubs, or gallops  Gastrointestinal: soft, non-tender, non-distended, no rebound/guarding, no palpable masses, normoactive bowel sounds x4  Back: spine midline, no bony tenderness or step-offs; no CVAT B/L  Extremities: WWP, no clubbing or cyanosis, no peripheral edema  Musculoskeletal: NROM x4; no joint swelling, tenderness or erythema  Vascular: 2+ radial and DP pulses b/l  Dermatologic: skin warm, dry and intact, no rashes, wounds, or scars  Lymphatic: no submandibular or cervical LAD  Neurologic: AAOx3, CNII-XII grossly intact, no focal deficits  Psychiatric: affect and characteristics of appearance, verbalizations, behaviors are appropriate PHYSICAL EXAM:    Constitutional: Elderly, comfortable appearing AA female on room air  Head: Nc/At, hair appears to be artificial   Eyes: right eye closed, left eye cloudy, does not react to light  ENT: no dentition, moist mucous membranes  Respiratory: CTA b/l, no wheezes, rales, or rhonchi  Cardiac: +S1/S2, +RRR, no murmurs, rubs, or gallops  Gastrointestinal: soft, non-tender, non-distended, no rebound/guarding, no palpable masses, normoactive bowel sounds x4  Back: spine midline, no bony tenderness or step-offs; no CVAT B/L  Extremities: WWP, no clubbing or cyanosis, no peripheral edema  Musculoskeletal: NROM x4; no joint swelling, tenderness or erythema  Vascular: 2+ radial and DP pulses b/l  Dermatologic: skin warm, dry and intact, no rashes, wounds, or scars  Lymphatic: no submandibular or cervical LAD  Neurologic: AAOx2, CNII-XII grossly intact, no focal deficits  Psychiatric: affect and characteristics of appearance, verbalizations, behaviors are appropriate PHYSICAL EXAM:    Constitutional: Elderly, comfortable appearing AA female on room air  Head: Nc/At, hair appears to be artificial   Eyes: right eye closed, left eye cloudy, does not react to light  ENT: no dentition, moist mucous membranes  Respiratory: CTA b/l, no wheezes, rales, or rhonchi  Cardiac: +S1/S2, +RRR, no murmurs, rubs, or gallops  Gastrointestinal: soft, non-tender, non-distended, no rebound/guarding, no palpable masses, normoactive bowel sounds x4  Back: spine midline, no bony tenderness or step-offs; no CVAT B/L  Extremities: WWP, no clubbing or cyanosis, no peripheral edema  Musculoskeletal: NROM x4; no joint swelling, tenderness or erythema  Vascular: 2+ radial and DP pulses b/l  Dermatologic: skin warm, dry and intact, no rashes, wounds, or scars  Lymphatic: no submandibular or cervical LAD  Neurologic: AAO to self, place, president, month. B/l blindness otherwise no focal deficits  Psychiatric: tangential, does not respond to questions appropriately, but calm and cooperative

## 2020-05-29 NOTE — ED PROVIDER NOTE - MUSCULOSKELETAL, MLM
Spine appears normal, range of motion is not limited. B/l frozen shoulder; nontender; no deformity. All pulses intact. No edema.

## 2020-05-29 NOTE — ED ADULT NURSE NOTE - NSFALLRSKUNASSIST_ED_ALL_ED
Patient Education     Bathing Your      Don't forget to clean between the folds of your baby's skin.   Until your ’s umbilical cord falls off, sponge baths are the best way to bathe your baby. Gather supplies, including diapers and clothes, ahead of time. This could include gentle baby soap, 2 washcloths, 2 towels, diapers, clothes, a blanket, and a hypoallergenic lotion (if desired). Bathe your  every 2 to 3 days, using the steps below as a guide. You can wash the diaper area more frequently as needed to keep the baby clean.  Important! Never leave your baby alone near the water--not even for a few seconds! If you must leave the room during a bath, always take the baby with you.   Step 1. Wash your baby’s face  · Use warm water on a clean, soft cloth or cotton ball. Do not add soap.  · Wipe the eyes gently. To prevent infection, use a fresh cotton ball or a clean part of the cloth for each eye. Wipe from the inner corner of the eye outward.  · Wash behind baby’s ears and under the chin.  Step 2. Bathe the body, arms, and legs  · Place a small amount of mild, unscented soap on a clean, wet cloth.  · Clean between any folds of skin.  · Uncurl baby’s fingers and wipe the palms. Wash under baby’s arms and behind both knees.  · Try to keep the baby’s umbilical cord dry. Uncover the area by folding the diaper under the umbilical cord so that air can help keep it dry. Dressing your baby in loose clothing will also help keep the area dry.  · If your baby’s umbilical cord gets dirty, clean it with water and allow it to air dry.  · Give your baby sponge baths until the umbilical cord has fallen off and the area is healed. If it gets wet, expose the area to air so it can dry.  Step 3. Wash your baby’s bottom  · Bathe baby’s bottom after the rest of the body.  · Wash girls from front to back only.  · When bathing a boy, never push back the foreskin on an uncircumcised penis.  Step 4. Take care of baby’s  scalp  · Gently rub or comb your baby’s scalp each day.  · Wash baby’s scalp once or twice a week, using a mild, no-tears shampoo. This can prevent cradle cap (a skin rash similar to dandruff common with infants). You can wrap the baby in a warm towel and then wash the scalp and hair.  · Newborns rarely need lotions or powders. If you want to use a lotion, choose a hypoallergenic one. If you choose to use powders, apply the powder to your hands and then rub in on your baby's skin. If the baby breathes in the powder, this can cause lung problems.  Date Last Reviewed: 2016 [employer]. [address] [place] [address], [place] [address]. All rights reserved. This information is not intended as a substitute for professional medical care. Always follow your healthcare professional's instructions.         Patient Education     Well-Baby Checkup:     Your baby’s first checkup will likely happen within a week of birth. At this  visit, the healthcare provider will examine your baby and ask questions about the first few days at home. This sheet describes some of what you can expect.  Jaundice  All babies develop some yellowing of the skin and the white part of the eyes (jaundice) in the first week of life. Your healthcare provider will advise you if you need to have your baby's bilirubin level checked. Your provider will advise you if your baby needs a follow-up check or needs treatment with phototherapy.  Development and milestones  The healthcare provider will ask questions about your . He or she will watch your baby to get an idea of his or her development. By this visit, your  is likely doing some of the following:  · Blinking at a bright light  · Trying to lift his or her head  · Wiggling and squirming. Each arm and leg should move about the same amount. If the baby favors one side, tell the healthcare provider.  · Becoming startled when hearing a loud noise  Feeding  tips  It’s normal for a  to lose up to 10% of his or her birth weight during the first week. This is usually gained back by about 2 weeks of age. If you are concerned about your ’s weight, tell the healthcare provider. To help your baby eat well, follow these tips:  · Breastmilk is recommended for your baby's first 6 months.   · Your baby should not have water unless his or her healthcare provider recommends it.  · During the day, feed at least every 2 to 3 hours. You may need to wake your baby for daytime feedings.  · At night, feed every 3 to 4 hours. At first, wake your baby for feedings if needed. Once your  is back to his or her birth weight, you may choose to let your baby sleep until he or she is hungry. Discuss this with your baby’s healthcare provider.  · Ask the healthcare provider if your baby should take vitamin D.  If you breastfeed  · Once your milk comes in, your breasts should feel full before a feeding and soft and deflated afterward. This likely means that your baby is getting enough to eat.  · Breastfeeding sessions usually take 15 to 20 minutes. If you feed the baby breastmilk from a bottle, give 1 to 3 ounces at each feeding.   ·  babies may want to eat more often than every 2 to 3 hours. It’s OK to feed your baby more often if he or she seems hungry. Talk with the healthcare provider if you are concerned about your baby’s breastfeeding habits or weight gain.  · It can take some time to get the hang of breastfeeding. It may be uncomfortable at first. If you have questions or need help, a lactation consultant can give you tips.  If you use formula  · Use a formula made just for infants. If you need help choosing, ask the healthcare provider for a recommendation. Regular cow's milk is not an appropriate food for a  baby.  · Feed around 1 to 3 ounces of formula at each feeding.  Hygiene tips  · Some newborns poop (stool) after every feeding. Others stool less  often. Both are normal. Change the diaper whenever it’s wet or dirty.  · It’s normal for a ’s stool to be yellow, watery, and look like it contains little seeds. The color may range from mustard yellow to pale yellow to green. If it’s another color, tell the healthcare provider.  · A boy should have a strong stream when he urinates. If your son doesn’t, tell the healthcare provider.  · Give your baby sponge baths until the umbilical cord falls off. If you have questions about caring for the umbilical cord, ask your baby’s healthcare provider.  · Follow your healthcare provider's recommendations about how to care for the umbilical cord. This care might include:  ? Keeping the area clean and dry.  ? Folding down the top of the diaper to expose the umbilical cord to the air.  ? Cleaning the umbilical cord gently with a baby wipe or with a cotton swab dipped in rubbing alcohol.  · Call your healthcare provider if the umbilical cord area has pus or redness.  · After the cord falls off, bathe your  a few times per week. You may give baths more often if the baby seems to like it. But because you are cleaning the baby during diaper changes, a daily bath often isn’t needed.  · It’s OK to use mild (hypoallergenic) creams or lotions on the baby’s skin. Avoid putting lotion on the baby’s hands.  Sleeping tips  Newborns usually sleep around 18 to 20 hours each day. To help your  sleep safely and soundly and prevent SIDS (sudden infant death syndrome):  · Place the infant on his or her back for all sleeping until the child is 1-year-old. This can decrease the risk for SIDS, aspiration, and choking. Never place the baby on his or her side or stomach for sleep or naps. If the baby is awake, allow the child time on his or her tummy as long as there is supervision. This helps the child build strong tummy and neck muscles. This will also help minimize flattening of the head that can happen when babies spend so  much time on their backs.  · Offer the baby a pacifier for sleeping or naps. If the child is breastfeeding, do not give the baby a pacifier until breastfeeding has been fully established. Breastfeeding is associated with reduced risk of SIDS.  · Use a firm mattress (covered by a tight fitted sheet) to prevent gaps between the mattress and the sides of a crib, play yard, or bassinet. This can decrease the risk of entrapment, suffocation, and SIDS.  · Don’t put a pillow, heavy blankets, or stuffed animals in the crib. These could suffocate the baby.  · Swaddling (wrapping the baby tightly in a blanket) may cause your baby to overheat. Don't let your child get too hot.  · Avoid placing infants on a couch or armchair for sleep. Sleeping on a couch or armchair puts the infant at a much higher risk of death, including SIDS.  · Avoid using infant seats, car seats, and infant swings for routine sleep and daily naps. These may lead to obstruction of an infant's airway or suffocation.  · Don't share a bed (co-sleep) with your baby. It's not safe.  · The AAP recommends that infants sleep in the same room as their parents, close to their parents' bed, but in a separate bed or crib appropriate for infants. This sleeping arrangement is recommended ideally for the baby's first year, but should at least be maintained for the first 6 months.  · Always place cribs, bassinets, and play yards in hazard-free areas--those with no dangling cords, wires, or window coverings--to help decrease strangulation.  · Avoid using cardiorespiratory monitors and commercial devices--wedges, positioners, and special mattresses--to help decrease the risk for SIDS and sleep-related infant deaths. These devices have not been shown to prevent SIDS. In rare cases, they have resulted in the death of an infant.  · Discuss these and other health and safety issues with your baby’s healthcare provider.  Safety tips  · To avoid burns, don’t carry or drink hot  liquids such as coffee near the baby. Turn the water heater down to a temperature of 120°F (49°C) or below.  · Don’t smoke or allow others to smoke near the baby. If you or other family members smoke, do so outdoors and never around the baby.  · It’s usually fine to take a  out of the house. But avoid confined, crowded places where germs can spread. You may invite visitors to your home to see your baby, as long as they are not sick.  · When you do take the baby outside, avoid staying too long in direct sunlight. Keep the baby covered, or seek out the shade.  · In the car, always put the baby in a rear-facing car seat. This should be secured in the back seat, according to the car seat’s directions. Never leave your baby alone in the car.  · Do not leave your baby on a high surface, such as a table, bed, or couch. He or she could fall and get hurt.  · Older siblings will likely want to hold, play with, and get to know the baby. This is fine as long as an adult supervises.  · Call the doctor right away if your baby has a fever (see Fever and children, below)     Fever and children  Always use a digital thermometer to check your child’s temperature. Never use a mercury thermometer.  For infants and toddlers, be sure to use a rectal thermometer correctly. A rectal thermometer may accidentally poke a hole in (perforate) the rectum. It may also pass on germs from the stool. Always follow the product maker’s directions for proper use. If you don’t feel comfortable taking a rectal temperature, use another method. When you talk to your child’s healthcare provider, tell him or her which method you used to take your child’s temperature.  Here are guidelines for fever temperature. Ear temperatures aren’t accurate before 6 months of age. Don’t take an oral temperature until your child is at least 4 years old.  Infant under 3 months old:  · Ask your child’s healthcare provider how you should take the temperature.  · Rectal  or forehead (temporal artery) temperature of 100.4°F (38°C) or higher, or as directed by the provider  · Armpit temperature of 99°F (37.2°C) or higher, or as directed by the provider      Vaccines  Based on recommendations from the American Association of Pediatrics, at this visit your baby may get the hepatitis B vaccine if he or she did not already get it in the hospital.  Parental fatigue: A tiring problem  Taking care of a  can be physically and emotionally draining. Right now it may seem like you have time for nothing else. But taking good care of yourself will help you care for your baby too. Here are some tips:  · Take a break. When your baby is sleeping, take a little time for yourself. Lie down for a nap or put up your feet and rest. Know when to say “no” to visitors. Until you feel rested, ignore household clutter and put off nonessential tasks. Give yourself time to settle into your new role as a parent.  · Eat healthy. Good nutrition gives you energy. And if you have just given birth, healthy eating helps your body recover. Try to eat a variety of fruits, vegetables, grains, and sources of protein. Avoid processed “junk” foods. And limit caffeine, especially if you’re breastfeeding. Stay hydrated by drinking plenty of water.  · Accept help. Caring for a new baby can be overwhelming. Don’t be afraid to ask others for help. Allow family and friends to help with the housework, meals, and laundry, so you and your partner have time to bond with your new baby. If you need more help, talk to the healthcare provider about other options.     Next checkup at: _______________________________     PARENT NOTES:  Date Last Reviewed: 10/1/2016  © 5023-2733 [employer]. [address] [place] [address], [place] [address]. All rights reserved. This information is not intended as a substitute for professional medical care. Always follow your healthcare professional's instructions.    Patient Education      Protect Your [place] from Cigarette Smoke  You’ve likely heard about the dangers of secondhand smoke. But did you know that cigarette smoke is even worse for babies than it is for adults? Now that you’ve brought your  home, it’s crucial to keep cigarette smoke away from the baby. You may have already quit smoking when you found out you were going to have a baby. If not, it’s still not too late. If anyone else in your household smokes, now is the time for them to quit. If you or someone else in the household keeps smoking, you can at least make changes to protect the baby. This goes for anyone who spends time near the baby, including grandparents, friends, and babysitters.  How cigarette smoke can harm your baby  Research shows that smoking around newborns can cause severe health problems. These include:  · Asthma or other lifelong breathing problems  · Worsening of colds or other respiratory problems  · Poor growth and development, both mentally and physically  · Higher chance of SIDS (sudden infant death syndrome)  Protecting your baby from smoke  If someone in your household smokes and isn’t ready to quit, you can still protect your baby. Ban smoking inside the house and the car. Any smoker (including you, if you smoke) should smoke only outside, away from windows and doors. If you wear a jacket or sweatshirt while smoking, take it off before holding the baby. Never let anyone smoke around the baby. And never take the baby into an area where people are smoking. If you have visitors who smoke, you may want to explain your smoking rules before they come over, so they know what to expect.  Quitting is BEST for your baby  If you smoke, quitting is the best thing you can do for your baby and for yourself. Quitting is hard, but you can do it! Here are some tips:  · Tape a picture of your  to your pack of cigarettes. Look at it each time you smoke. This will remind you of the best reason to  quit.  · Join a support group or smoking cessation class. This will give you the support and skills you need to quit smoking. You may even meet other parents in the same situation. If you need help finding a group or class, your healthcare provider can suggest one in your area.  · Ask other smokers in the family to quit with you. This way, you can support each other.  · Talk with your healthcare provider about your desire to stop smoking. Both counseling and medicines can help you successfully quit smoking.  · If you don’t succeed the first time, try again! Many people have to try more than once before they quit for good. Just remember, you’re doing it for your baby. Trying to quit is better for your baby and yourself than if you’d never tried at all.        For more information  · smokefree.gov/ilcz-dk-fl-expert  · National [employer] [place] Smoking Quitline: 877-44U-QUIT (008-946-2006)   Date Last Reviewed: 11/1/2016  © 5909-7198 [employer]. [address] [place] [address], [place] [address]. All rights reserved. This information is not intended as a substitute for professional medical care. Always follow your healthcare professional's instructions.                 no

## 2020-05-29 NOTE — ED ADULT TRIAGE NOTE - CHIEF COMPLAINT QUOTE
Per EMS, patient has PMHx of HTN and Dementia, found this morning by HHA to be kneeling on the floor.  Patient complaining of bilateral UE/LE pain.

## 2020-05-29 NOTE — ED ADULT NURSE NOTE - NURSING MUSC EXTREMITY LIMITED ROM
left upper extremity/right upper extremity/pt reports shoulders bilaterally with movement difficulty

## 2020-05-29 NOTE — H&P ADULT - PROBLEM SELECTOR PLAN 9
F: NS at 70 x 5 hours  N: pureed   DVT: lovenox  FULL CODE (unable to get answer from pt but niece states patient wants to live to 100 is unaware of any DNR/DNI)  DISPO: [facility] ADDENDUM: thrush suspected on mouth examination o/n, started on nystatin s&s; check A1c

## 2020-05-29 NOTE — H&P ADULT - PROBLEM SELECTOR PLAN 7
F: NS at 120  N: pureed   DVT: lovenox  FULL CODE (unable to get answer from pt but niece states patient wants to live to 100 is unaware of any DNR/DNI)  DISPO: [facility] asymmetrical ocular lens noted on CT head  consult ophtho or outpatient f/u asymmetrical ocular lens noted on CT head -- replaced left lens is eccentric  consult ophtho or outpatient f/u asymmetrical ocular lens noted on CT head -- replaced left lens is eccentric  consult ophtho or outpatient f/u    #decreased vision: see above

## 2020-05-29 NOTE — ED ADULT NURSE REASSESSMENT NOTE - GENERAL PATIENT STATE
comfortable appearance/resting/sleeping/pt still denies any complaints, IVF initiated as per MD orders/smiling/interactive

## 2020-05-29 NOTE — PHYSICAL THERAPY INITIAL EVALUATION ADULT - ADDITIONAL COMMENTS
Pt lives alone without stairs, prior use of cane. She has a HHA 8 hours/7 days (8:00-16:00). Pt states she is a community ambulator and goes with her aid grocery shopping. Pt may not be reliable, possibly confused as she states she is a  and cannot identify the year of her birth nor the year now. Pt states she has had maybe around 10 falls in past year.

## 2020-05-29 NOTE — H&P ADULT - ATTENDING COMMENTS
patient seen and examined overnight  ; pt stated that she was kneeling to pray when HHA found her. a/f CAYLA, dehydration, FEI placement   reviewed pertinent data, h&p, PE as above except R eye is cloudy as well; pt w/ likely w/ thrush; pt oriented to self at time of my exam.     1. volume depletion s/p fluids, followup orthostatics, holding metoprolol ; pt w/ decreased vision, Left ocular lens is asymmetric per CT scan, will outpt ophtho evaluation   2. CAYLA: monitor renal function  3. PT evaluation recommends FEI      rest of plan as above

## 2020-05-30 DIAGNOSIS — Z98.890 OTHER SPECIFIED POSTPROCEDURAL STATES: Chronic | ICD-10-CM

## 2020-05-30 DIAGNOSIS — B37.0 CANDIDAL STOMATITIS: ICD-10-CM

## 2020-05-30 DIAGNOSIS — N17.9 ACUTE KIDNEY FAILURE, UNSPECIFIED: ICD-10-CM

## 2020-05-30 LAB
ANION GAP SERPL CALC-SCNC: 15 MMOL/L — SIGNIFICANT CHANGE UP (ref 5–17)
BUN SERPL-MCNC: 36 MG/DL — HIGH (ref 7–23)
CALCIUM SERPL-MCNC: 10 MG/DL — SIGNIFICANT CHANGE UP (ref 8.4–10.5)
CHLORIDE SERPL-SCNC: 108 MMOL/L — SIGNIFICANT CHANGE UP (ref 96–108)
CO2 SERPL-SCNC: 19 MMOL/L — LOW (ref 22–31)
CREAT SERPL-MCNC: 1.52 MG/DL — HIGH (ref 0.5–1.3)
CULTURE RESULTS: NO GROWTH — SIGNIFICANT CHANGE UP
GLUCOSE SERPL-MCNC: 113 MG/DL — HIGH (ref 70–99)
MAGNESIUM SERPL-MCNC: 2.2 MG/DL — SIGNIFICANT CHANGE UP (ref 1.6–2.6)
PHOSPHATE SERPL-MCNC: 2.9 MG/DL — SIGNIFICANT CHANGE UP (ref 2.5–4.5)
POTASSIUM SERPL-MCNC: 5.2 MMOL/L — SIGNIFICANT CHANGE UP (ref 3.5–5.3)
POTASSIUM SERPL-SCNC: 5.2 MMOL/L — SIGNIFICANT CHANGE UP (ref 3.5–5.3)
SODIUM SERPL-SCNC: 142 MMOL/L — SIGNIFICANT CHANGE UP (ref 135–145)
SPECIMEN SOURCE: SIGNIFICANT CHANGE UP
TSH SERPL-MCNC: 0.44 UIU/ML — SIGNIFICANT CHANGE UP (ref 0.35–4.94)
VIT B12 SERPL-MCNC: 1098 PG/ML — SIGNIFICANT CHANGE UP (ref 232–1245)

## 2020-05-30 PROCEDURE — 73502 X-RAY EXAM HIP UNI 2-3 VIEWS: CPT | Mod: 26,LT

## 2020-05-30 PROCEDURE — 99233 SBSQ HOSP IP/OBS HIGH 50: CPT | Mod: GC

## 2020-05-30 RX ORDER — ACETAMINOPHEN 500 MG
650 TABLET ORAL EVERY 6 HOURS
Refills: 0 | Status: DISCONTINUED | OUTPATIENT
Start: 2020-05-30 | End: 2020-06-02

## 2020-05-30 RX ORDER — ACETAMINOPHEN 500 MG
650 TABLET ORAL ONCE
Refills: 0 | Status: COMPLETED | OUTPATIENT
Start: 2020-05-30 | End: 2020-05-30

## 2020-05-30 RX ORDER — NYSTATIN 500MM UNIT
500000 POWDER (EA) MISCELLANEOUS
Refills: 0 | Status: DISCONTINUED | OUTPATIENT
Start: 2020-05-30 | End: 2020-06-02

## 2020-05-30 RX ADMIN — Medication 500000 UNIT(S): at 17:50

## 2020-05-30 RX ADMIN — Medication 500000 UNIT(S): at 12:00

## 2020-05-30 RX ADMIN — Medication 650 MILLIGRAM(S): at 19:26

## 2020-05-30 RX ADMIN — Medication 650 MILLIGRAM(S): at 03:31

## 2020-05-30 RX ADMIN — ENOXAPARIN SODIUM 30 MILLIGRAM(S): 100 INJECTION SUBCUTANEOUS at 21:44

## 2020-05-30 NOTE — CHART NOTE - NSCHARTNOTEFT_GEN_A_CORE
Upon Nutritional Assessment by the Registered Dietitian your patient was determined to meet criteria / has evidence of the following diagnosis/diagnoses:          [ ]  Mild Protein Calorie Malnutrition        [ ]  Moderate Protein Calorie Malnutrition        [x] Severe Protein Calorie Malnutrition        [ ] Unspecified Protein Calorie Malnutrition        [ ] Underweight / BMI <19        [ ] Morbid Obesity / BMI > 40      Findings as based on:  •  Comprehensive nutrition assessment and consultation  Suspected severe malnutrition RT presumed intake<EER AEB decreased PO intake (assume <50% EER >5 Days), Moderate Muscle Wasting      Treatment:    The following diet has been recommended:    1. Puree Diet + use of oral nutrition supplements, consider use of Nepro x2/day for 425kcal/20gm prot per 1 can (K WDL, However is on Higher end of normal range).  2. Monitor for %PO intake.  3. Monitor for CLOSELY for diet tolerance.  >> Monitor for ability to advance diet Pending tolerance upon arrival of dentures.  >> Should pt be noted with s/s of issues swallowing, recommend NPO/SLP cons.  4. Monitor Skin, Wts, GI, GOC.  5. MVI daily.  6. Monitor Labs- monitor BMP, CBC, glucose, lytes, trend renal indices, POCT.  7. RD to remain available for additional nutrition interventions as needed.       PROVIDER Section:     By signing this assessment you are acknowledging and agree with the diagnosis/diagnoses assigned by the Registered Dietitian    Comments:

## 2020-05-30 NOTE — PROGRESS NOTE ADULT - SUBJECTIVE AND OBJECTIVE BOX
OVERNIGHT EVENTS: Bladder scan showing <100cc urine, patient not retaining.    SUBJECTIVE / INTERVAL HPI: Patient seen and examined at bedside. Patient endorsing some left hip pain. Otherwise, with no complaints this morning. Denies fevers/chills, chest pain, sob, abdominal pain, nausea/vomiting.     VITAL SIGNS:  Vital Signs Last 24 Hrs  T(C): 36.8 (30 May 2020 09:03), Max: 37 (29 May 2020 21:43)  T(F): 98.2 (30 May 2020 09:03), Max: 98.6 (29 May 2020 21:43)  HR: 81 (30 May 2020 09:03) (69 - 94)  BP: 157/69 (30 May 2020 09:03) (113/71 - 157/69)  BP(mean): --  RR: 18 (30 May 2020 09:03) (16 - 18)  SpO2: 97% (30 May 2020 09:03) (95% - 100%)    PHYSICAL EXAM:    General: WDWN  HEENT: NC/AT; PERRL, anicteric sclera; MMM  Neck: supple  Cardiovascular: +S1/S2; RRR  Respiratory: CTA B/L; no W/R/R  Gastrointestinal: soft, NT/ND; +BSx4  Extremities: WWP; no edema, clubbing or cyanosis  Vascular: 2+ radial, DP/PT pulses B/L  Neurological: AAOx3; no focal deficits    MEDICATIONS:  MEDICATIONS  (STANDING):  enoxaparin Injectable 30 milliGRAM(s) SubCutaneous every 24 hours  nystatin    Suspension 069956 Unit(s) Oral four times a day    MEDICATIONS  (PRN):      ALLERGIES:  Allergies    No Known Allergies    Intolerances        LABS:                        13.9   10.36 )-----------( 160      ( 29 May 2020 10:47 )             42.3     05-30    142  |  108  |  36<H>  ----------------------------<  113<H>  5.2   |  19<L>  |  1.52<H>    Ca    10.0      30 May 2020 08:07  Phos  2.9     05-30  Mg     2.2     05-30    TPro  7.3  /  Alb  3.7  /  TBili  1.0  /  DBili  x   /  AST  27  /  ALT  13  /  AlkPhos  54  05-29    PT/INR - ( 29 May 2020 10:47 )   PT: 11.8 sec;   INR: 1.03          PTT - ( 29 May 2020 10:47 )  PTT:23.7 sec  Urinalysis Basic - ( 29 May 2020 13:09 )    Color: Yellow / Appearance: Clear / SG: >=1.030 / pH: x  Gluc: x / Ketone: Trace mg/dL  / Bili: Negative / Urobili: 0.2 E.U./dL   Blood: x / Protein: Trace mg/dL / Nitrite: NEGATIVE   Leuk Esterase: NEGATIVE / RBC: < 5 /HPF / WBC < 5 /HPF   Sq Epi: x / Non Sq Epi: 0-5 /HPF / Bacteria: Present /HPF      CAPILLARY BLOOD GLUCOSE          RADIOLOGY & ADDITIONAL TESTS: Reviewed. OVERNIGHT EVENTS: Bladder scan showing <100cc urine, patient not retaining.    SUBJECTIVE / INTERVAL HPI: Patient seen and examined at bedside. Patient endorsing some left hip pain. Otherwise, with no complaints this morning. Denies fevers/chills, chest pain, sob, abdominal pain, nausea/vomiting. Still missing dentures which niece will bring today.    VITAL SIGNS:  Vital Signs Last 24 Hrs  T(C): 36.8 (30 May 2020 09:03), Max: 37 (29 May 2020 21:43)  T(F): 98.2 (30 May 2020 09:03), Max: 98.6 (29 May 2020 21:43)  HR: 81 (30 May 2020 09:03) (69 - 94)  BP: 157/69 (30 May 2020 09:03) (113/71 - 157/69)  BP(mean): --  RR: 18 (30 May 2020 09:03) (16 - 18)  SpO2: 97% (30 May 2020 09:03) (95% - 100%)    PHYSICAL EXAM:    General: Elderly female, pleasant, laying comfortably in bed.  HEENT: NC/AT; left eye clouded and not reactive to light, anicteric sclera; MMM  Neck: supple  Cardiovascular: +S1/S2; RRR  Respiratory: CTA B/L; no W/R/R  Gastrointestinal: soft, NT/ND; +BSx4  Extremities: WWP; no edema, clubbing or cyanosis  Vascular: 2+ radial, DP/PT pulses B/L  Neurological: AAOx3; no focal deficits    MEDICATIONS:  MEDICATIONS  (STANDING):  enoxaparin Injectable 30 milliGRAM(s) SubCutaneous every 24 hours  nystatin    Suspension 534693 Unit(s) Oral four times a day    MEDICATIONS  (PRN):      ALLERGIES:  Allergies    No Known Allergies    Intolerances        LABS:                        13.9   10.36 )-----------( 160      ( 29 May 2020 10:47 )             42.3     05-30    142  |  108  |  36<H>  ----------------------------<  113<H>  5.2   |  19<L>  |  1.52<H>    Ca    10.0      30 May 2020 08:07  Phos  2.9     05-30  Mg     2.2     05-30    TPro  7.3  /  Alb  3.7  /  TBili  1.0  /  DBili  x   /  AST  27  /  ALT  13  /  AlkPhos  54  05-29    PT/INR - ( 29 May 2020 10:47 )   PT: 11.8 sec;   INR: 1.03          PTT - ( 29 May 2020 10:47 )  PTT:23.7 sec  Urinalysis Basic - ( 29 May 2020 13:09 )    Color: Yellow / Appearance: Clear / SG: >=1.030 / pH: x  Gluc: x / Ketone: Trace mg/dL  / Bili: Negative / Urobili: 0.2 E.U./dL   Blood: x / Protein: Trace mg/dL / Nitrite: NEGATIVE   Leuk Esterase: NEGATIVE / RBC: < 5 /HPF / WBC < 5 /HPF   Sq Epi: x / Non Sq Epi: 0-5 /HPF / Bacteria: Present /HPF      CAPILLARY BLOOD GLUCOSE          RADIOLOGY & ADDITIONAL TESTS: Reviewed.

## 2020-05-30 NOTE — CHART NOTE - NSCHARTNOTEFT_GEN_A_CORE
Admitting Diagnosis:   Patient is a 89y old  Female who presents with a chief complaint of volume depletion (29 May 2020 17:19)      Consult: Yes [  x ]  No [   ]    Reason for Initial Nutrition Assessment: assessment / education  5/29       PAST MEDICAL & SURGICAL HISTORY:  Mitral regurgitation  Glaucoma  Shingles  No significant past surgical history      Current Nutrition Order: dys 1 puree, thins     PO Intake: Good (%) [   ]  Fair (50-75%) [   ] Poor (<25%) [   ]    GI Issues:  WDL per flow sheets     Pain: c/o BL leg pain per flow sheets     Skin Integrity: No edema or pressure ulcers currently documented     Labs:   05-29    142  |  107  |  40<H>  ----------------------------<  115<H>  5.3   |  19<L>  |  1.76<H>    Ca    10.0      29 May 2020 14:48  Mg     2.2     05-29    TPro  7.3  /  Alb  3.7  /  TBili  1.0  /  DBili  x   /  AST  27  /  ALT  13  /  AlkPhos  54  05-29    CAPILLARY BLOOD GLUCOSE        Nutritionally Pertinent Lab Values:  Trace ketones 5/29     Medications:  MEDICATIONS  (STANDING):  enoxaparin Injectable 30 milliGRAM(s) SubCutaneous every 24 hours  nystatin    Suspension 224892 Unit(s) Oral four times a day    MEDICATIONS  (PRN):      Admitted Anthropometrics:  5'0''  pound +-10%  5/29 107 pounds  BMI 21.2 %OQT=402%    Weight Change:     Nutrition Focused Physical Exam: Completed [   ]  Unable to complete [   ]  Muscle Wasting:  Subcutaneous Fat Wasting:    Estimated energy needs:     Subjective:   88 y/o F PMH moderate MR, mild dementia, HTN who presented from home after her aide found her on her knees and patient had difficulty getting up. Pt has been getting progressively weaker and more demented over time but no specific illness/events. Pt admitted for hydration d/t Volume depletion and assessment of safety of living alone.  Pt ordered for dys 1 puree/thin liquids diet 5/29. Per H&P pt does not have dentures with her.    Nutrition Diagnosis:    [  ] No active nutrition diagnosis at this time  [  ] Current medical condition precludes nutrition intervention    Goal:    Recommendations:    Education:     Risk Level: High [   ] Moderate [   ] Low [   ] Admitting Diagnosis:   Patient is a 89y old  Female who presents with a chief complaint of volume depletion (29 May 2020 17:19)      Consult: Yes [  x ]  No [   ]    Reason for Initial Nutrition Assessment: assessment / education  5/29       PAST MEDICAL & SURGICAL HISTORY:  Mitral regurgitation  Glaucoma  Shingles  No significant past surgical history      Current Nutrition Order: dys 1 puree, thins     PO Intake: Good (%) [   ]  Fair (50-75%) [   ] Poor (<25%) [   ]- Please see Below     GI Issues:  WDL per flow sheets     Pain: c/o BL leg pain per flow sheets     Skin Integrity: No edema or pressure ulcers currently documented     Labs:   05-29    142  |  107  |  40<H>  ----------------------------<  115<H>  5.3   |  19<L>  |  1.76<H>    Ca    10.0      29 May 2020 14:48  Mg     2.2     05-29    TPro  7.3  /  Alb  3.7  /  TBili  1.0  /  DBili  x   /  AST  27  /  ALT  13  /  AlkPhos  54  05-29    CAPILLARY BLOOD GLUCOSE        Nutritionally Pertinent Lab Values:  Trace ketones 5/29   K WDL - however is on higher end of normal range     Medications:  MEDICATIONS  (STANDING):  enoxaparin Injectable 30 milliGRAM(s) SubCutaneous every 24 hours  nystatin    Suspension 266651 Unit(s) Oral four times a day    MEDICATIONS  (PRN):      Admitted Anthropometrics:  5'0''  pound +-10%  5/29 107 pounds  BMI 21.2 %FTX=195%    Weight Change: UBW unsure however, A reports pt has likely lost wt.     Nutrition Focused Physical Exam:   Per physical assessment: Muscle Wasting- Temporal [   ]  Clavicle/Pectoral [ MODERATE ]  Shoulder/Deltoid [   ]  Scapula [   ]  Interosseous [   ]  Quadriceps [   ]  Gastrocnemius [   ]  Fat Wasting- Orbital [   ]  Buccal [   ]  Triceps [   ]  Rib [   ]  --> please see malnutrition chart note.     Estimated energy needs:   current body wt used for energy calculations as pt falls within % IBW   adjusted based on suspected malnutrition, fluids per team  1225-1470kcal/day 25-30kcal/kg  56-66gm/day 1.2-1.4gm/kg -- trend renal indices, Recommend low end prot needs given CAYLA     Subjective:   89 y/o F PMH moderate MR, mild dementia, HTN who presented from home after her aide found her on her knees and patient had difficulty getting up. Pt has been getting progressively weaker and more demented over time but no specific illness/events. Pt admitted for hydration d/t Volume depletion and assessment of safety of living alone. Noted CAYLA versus CKD 4, per H&P- seems to be downtrending with fluids supporting pre-renal CAYLA (BUN/Cr 36/1.52)  Pt visited, noted as confused per flow sheets. Spoke with HHA listed per H&P. Decreased PO intake PTA x "weeks." Pt has taken ensure in the past, unclear if taking currently. HHA started pureeing and blending food within last week PTA to provide ease during meals. Pt ordered for dys 1 puree/thin liquids diet 5/29. Per H&P pt does not have dentures with her- reported family is bringing in dentures today however HHA/PCA feel pt likely to remain with need for puree. Pt consumed 25-50% of breakfast today, aide reports pt consuming mostly apple sauce and eggs. No cough at meal reported. Spoke with RN, denies giving pt Meds thus far today however no reports of issues swallowing.  Please see below for nutritions recommendations. Pending order placed. Paged Team for Recs.     Nutrition Diagnosis: Suspected severe malnutrition RT presumed intake<EER AEB decreased PO intake (assume <50% EER >5 Days), Moderate Muscle Wasting  Goal: Pt will meet at least 75% of nutrient needs     Recommendations:  1. Puree Diet + use of oral nutrition supplements, consider use of Nepro x2/day for 425kcal/20gm prot per 1 can (K WDL, However is on Higher end of normal range).  2. Monitor for %PO intake.  3. Monitor for CLOSELY for diet tolerance.  >> Monitor for ability to advance diet Pending tolerance upon arrival of dentures.  >> Should pt be noted with s/s of issues swallowing, recommend NPO/SLP cons.  4. Monitor Skin, Wts, GI, GOC.  5. MVI daily.  6. Monitor Labs- monitor BMP, CBC, glucose, lytes, trend renal indices, POCT.  7. RD to remain available for additional nutrition interventions as needed.     Education: NA   Risk Level: High [  X  ] Moderate [   ] Low [   ] Admitting Diagnosis:   Patient is a 89y old  Female who presents with a chief complaint of volume depletion (29 May 2020 17:19)      Consult: Yes [  x ]  No [   ]    Reason for Initial Nutrition Assessment: assessment / education  5/29       PAST MEDICAL & SURGICAL HISTORY:  Mitral regurgitation  Glaucoma  Shingles  No significant past surgical history      Current Nutrition Order: dys 1 puree, thins     PO Intake: Good (%) [   ]  Fair (50-75%) [   ] Poor (<25%) [   ]- Please see Below     GI Issues:  WDL per flow sheets     Pain: c/o BL leg pain per flow sheets     Skin Integrity: No edema or pressure ulcers currently documented     Labs:   05-29    142  |  107  |  40<H>  ----------------------------<  115<H>  5.3   |  19<L>  |  1.76<H>    Ca    10.0      29 May 2020 14:48  Mg     2.2     05-29    TPro  7.3  /  Alb  3.7  /  TBili  1.0  /  DBili  x   /  AST  27  /  ALT  13  /  AlkPhos  54  05-29    CAPILLARY BLOOD GLUCOSE        Nutritionally Pertinent Lab Values:  Trace ketones 5/29   K WDL - however is on higher end of normal range     Medications:  MEDICATIONS  (STANDING):  enoxaparin Injectable 30 milliGRAM(s) SubCutaneous every 24 hours  nystatin    Suspension 719790 Unit(s) Oral four times a day    MEDICATIONS  (PRN):      Admitted Anthropometrics:  5'0''  pound +-10%  5/29 107 pounds  BMI 21.2 %ORK=052%    Weight Change: UBW unsure however, A reports pt has likely lost wt.     Nutrition Focused Physical Exam:   Per physical assessment: Muscle Wasting- Temporal [   ]  Clavicle/Pectoral [ MODERATE ]  Shoulder/Deltoid [   ]  Scapula [   ]  Interosseous [   ]  Quadriceps [   ]  Gastrocnemius [   ]  Fat Wasting- Orbital [   ]  Buccal [   ]  Triceps [   ]  Rib [   ]  --> please see malnutrition chart note.     Estimated energy needs:   current body wt used for energy calculations as pt falls within % IBW   adjusted based on suspected malnutrition, fluids per team  1225-1470kcal/day 25-30kcal/kg  56-66gm/day 1.2-1.4gm/kg -- trend renal indices, Recommend low end prot needs given CAYLA     Subjective:   88 y/o F PMH moderate MR, mild dementia, HTN who presented from home after her aide found her on her knees and patient had difficulty getting up. Pt has been getting progressively weaker and more demented over time but no specific illness/events. Pt admitted for hydration d/t Volume depletion and assessment of safety of living alone. Noted CAYLA versus CKD 4, per H&P- seems to be downtrending with fluids supporting pre-renal CAYLA (BUN/Cr 36/1.52)  Pt visited, noted as confused per flow sheets. Spoke with HHA listed per H&P. Decreased PO intake PTA x "weeks." Pt has taken ensure in the past, unclear if taking currently. HHA started pureeing and blending food within last week PTA to provide ease during meals. Pt ordered for dys 1 puree/thin liquids diet 5/29. Per H&P pt does not have dentures with her- reported family is bringing in dentures today however HHA/PCA feel pt likely to remain with need for puree. Pt consumed 25-50% of breakfast today, aide reports pt consuming mostly apple sauce and eggs. No cough at meal reported. Spoke with RN, denies giving pt Meds thus far today however no reports of issues swallowing.  Please see below for nutritions recommendations. Discussed pt with team, Paged Team for Recs.     Nutrition Diagnosis: Suspected severe malnutrition RT presumed intake<EER AEB decreased PO intake (assume <50% EER >5 Days), Moderate Muscle Wasting  Goal: Pt will meet at least 75% of nutrient needs     Recommendations:  1. Puree Diet + use of oral nutrition supplements, consider use of Nepro x2/day for 425kcal/20gm prot per 1 can (K WDL, However is on Higher end of normal range).  2. Monitor for %PO intake.  3. Monitor for CLOSELY for diet tolerance.  >> Monitor for ability to advance diet Pending tolerance upon arrival of dentures.  >> Should pt be noted with s/s of issues swallowing, recommend NPO/SLP cons.  4. Monitor Skin, Wts, GI, GOC.  5. MVI daily.  6. Monitor Labs- monitor BMP, CBC, glucose, lytes, trend renal indices, POCT.  7. RD to remain available for additional nutrition interventions as needed.     Education: NA   Risk Level: High [  X  ] Moderate [   ] Low [   ] Admitting Diagnosis:   Patient is a 89y old  Female who presents with a chief complaint of volume depletion (29 May 2020 17:19)      Consult: Yes [  x ]  No [   ]    Reason for Initial Nutrition Assessment: assessment / education  5/29       PAST MEDICAL & SURGICAL HISTORY:  Mitral regurgitation  Glaucoma  Shingles  No significant past surgical history      Current Nutrition Order: dys 1 puree, thins     PO Intake: Good (%) [   ]  Fair (50-75%) [   ] Poor (<25%) [   ]- Please see Below     GI Issues:  WDL per flow sheets     Pain: c/o BL leg pain per flow sheets     Skin Integrity: No edema or pressure ulcers currently documented     Labs:   05-29    142  |  107  |  40<H>  ----------------------------<  115<H>  5.3   |  19<L>  |  1.76<H>    Ca    10.0      29 May 2020 14:48  Mg     2.2     05-29    TPro  7.3  /  Alb  3.7  /  TBili  1.0  /  DBili  x   /  AST  27  /  ALT  13  /  AlkPhos  54  05-29    CAPILLARY BLOOD GLUCOSE        Nutritionally Pertinent Lab Values:  Trace ketones 5/29   K WDL - however is on higher end of normal range     Medications:  MEDICATIONS  (STANDING):  enoxaparin Injectable 30 milliGRAM(s) SubCutaneous every 24 hours  nystatin    Suspension [id number] Unit(s) Oral four times a day    MEDICATIONS  (PRN):      Admitted Anthropometrics:  5'0''  pound +-10%  5/29 107 pounds  BMI 21.2 %FIJ=225%    Weight Change: UBW unsure however, A reports pt has likely lost wt.     Nutrition Focused Physical Exam:   Per physical assessment: Muscle Wasting- Temporal [   ]  Clavicle/Pectoral [ MODERATE ]  Shoulder/Deltoid [   ]  Scapula [   ]  Interosseous [   ]  Quadriceps [   ]  Gastrocnemius [   ]  Fat Wasting- Orbital [   ]  Buccal [   ]  Triceps [   ]  Rib [   ]  --> please see malnutrition chart note.     Estimated energy needs:   current body wt used for energy calculations as pt falls within % IBW   adjusted based on suspected malnutrition, fluids per team  1225-1470kcal/day 25-30kcal/kg  56-66gm/day 1.2-1.4gm/kg -- trend renal indices, Recommend low end prot needs given CAYLA     Subjective:   88 y/o F PMH moderate MR, mild dementia, HTN who presented from home after her aide found her on her knees and patient had difficulty getting up. Pt has been getting progressively weaker and more demented over time but no specific illness/events. Pt admitted for hydration d/t Volume depletion and assessment of safety of living alone. Noted CAYLA versus CKD 4, per H&P- seems to be downtrending with fluids supporting pre-renal CAYLA (BUN/Cr 36/1.52)  Pt visited, noted as confused per flow sheets. Spoke with HHA listed per H&P. Decreased PO intake PTA x "weeks." Pt has taken ensure in the past, unclear if taking currently. HHA started pureeing and blending food within last week PTA to provide ease during meals. Pt ordered for dys 1 puree/thin liquids diet 5/29. Per H&P pt does not have dentures with her, assume reason for puree- reported family is bringing in dentures today however HHA/PCA feel pt likely to remain with need for puree. Pt consumed 25-50% of breakfast today, PCA reports pt consuming mostly apple sauce and eggs. No cough at meal reported. Spoke with RN, denies giving pt Meds thus far today however no reports of issues swallowing.  Please see below for nutritions recommendations. Discussed pt with team, Paged Team for Recs.     Nutrition Diagnosis: Suspected severe malnutrition RT presumed intake<EER AEB decreased PO intake (assume <50% EER >5 Days), Moderate Muscle Wasting  Goal: Pt will meet at least 75% of nutrient needs     Recommendations:  1. Puree Diet + use of oral nutrition supplements, consider use of Nepro x2/day for 425kcal/20gm prot per 1 can (K WDL, However is on Higher end of normal range).  2. Monitor for %PO intake.  3. Monitor for CLOSELY for diet tolerance.  >> Monitor for ability to advance diet Pending tolerance upon arrival of dentures.  >> Should pt be noted with s/s of issues swallowing, recommend NPO/SLP cons.  4. Monitor Skin, Wts, GI, GOC.  5. MVI daily.  6. Monitor Labs- monitor BMP, CBC, glucose, lytes, trend renal indices, POCT.  7. RD to remain available for additional nutrition interventions as needed.     Education: NA   Risk Level: High [  X  ] Moderate [   ] Low [   ]

## 2020-05-31 LAB
A1C WITH ESTIMATED AVERAGE GLUCOSE RESULT: 5.7 % — HIGH (ref 4–5.6)
ANION GAP SERPL CALC-SCNC: 13 MMOL/L — SIGNIFICANT CHANGE UP (ref 5–17)
BUN SERPL-MCNC: 29 MG/DL — HIGH (ref 7–23)
CALCIUM SERPL-MCNC: 10.1 MG/DL — SIGNIFICANT CHANGE UP (ref 8.4–10.5)
CHLORIDE SERPL-SCNC: 108 MMOL/L — SIGNIFICANT CHANGE UP (ref 96–108)
CO2 SERPL-SCNC: 22 MMOL/L — SIGNIFICANT CHANGE UP (ref 22–31)
CREAT ?TM UR-MCNC: 101 MG/DL — SIGNIFICANT CHANGE UP
CREAT SERPL-MCNC: 1.47 MG/DL — HIGH (ref 0.5–1.3)
ESTIMATED AVERAGE GLUCOSE: 117 MG/DL — HIGH (ref 68–114)
GLUCOSE SERPL-MCNC: 99 MG/DL — SIGNIFICANT CHANGE UP (ref 70–99)
HCT VFR BLD CALC: 43.8 % — SIGNIFICANT CHANGE UP (ref 34.5–45)
HGB BLD-MCNC: 14 G/DL — SIGNIFICANT CHANGE UP (ref 11.5–15.5)
MAGNESIUM SERPL-MCNC: 2.1 MG/DL — SIGNIFICANT CHANGE UP (ref 1.6–2.6)
MCHC RBC-ENTMCNC: 30 PG — SIGNIFICANT CHANGE UP (ref 27–34)
MCHC RBC-ENTMCNC: 32 GM/DL — SIGNIFICANT CHANGE UP (ref 32–36)
MCV RBC AUTO: 94 FL — SIGNIFICANT CHANGE UP (ref 80–100)
NRBC # BLD: 0 /100 WBCS — SIGNIFICANT CHANGE UP (ref 0–0)
PHOSPHATE SERPL-MCNC: 2.4 MG/DL — LOW (ref 2.5–4.5)
PLATELET # BLD AUTO: 178 K/UL — SIGNIFICANT CHANGE UP (ref 150–400)
POTASSIUM SERPL-MCNC: 4.2 MMOL/L — SIGNIFICANT CHANGE UP (ref 3.5–5.3)
POTASSIUM SERPL-SCNC: 4.2 MMOL/L — SIGNIFICANT CHANGE UP (ref 3.5–5.3)
RBC # BLD: 4.66 M/UL — SIGNIFICANT CHANGE UP (ref 3.8–5.2)
RBC # FLD: 13.4 % — SIGNIFICANT CHANGE UP (ref 10.3–14.5)
SODIUM SERPL-SCNC: 143 MMOL/L — SIGNIFICANT CHANGE UP (ref 135–145)
SODIUM UR-SCNC: 137 MMOL/L — SIGNIFICANT CHANGE UP
WBC # BLD: 8.68 K/UL — SIGNIFICANT CHANGE UP (ref 3.8–10.5)
WBC # FLD AUTO: 8.68 K/UL — SIGNIFICANT CHANGE UP (ref 3.8–10.5)

## 2020-05-31 PROCEDURE — 99233 SBSQ HOSP IP/OBS HIGH 50: CPT | Mod: GC

## 2020-05-31 RX ADMIN — Medication 500000 UNIT(S): at 06:12

## 2020-05-31 RX ADMIN — Medication 500000 UNIT(S): at 12:33

## 2020-05-31 RX ADMIN — Medication 650 MILLIGRAM(S): at 12:35

## 2020-05-31 RX ADMIN — Medication 500000 UNIT(S): at 17:03

## 2020-05-31 RX ADMIN — ENOXAPARIN SODIUM 30 MILLIGRAM(S): 100 INJECTION SUBCUTANEOUS at 21:07

## 2020-05-31 RX ADMIN — Medication 62.5 MILLIMOLE(S): at 10:18

## 2020-05-31 NOTE — PROGRESS NOTE ADULT - SUBJECTIVE AND OBJECTIVE BOX
OVERNIGHT EVENTS:    SUBJECTIVE / INTERVAL HPI: Patient seen and examined at bedside. Sleeping, no complaints.     VITAL SIGNS:  Vital Signs Last 24 Hrs  T(C): 37.2 (31 May 2020 08:41), Max: 37.2 (31 May 2020 08:41)  T(F): 99 (31 May 2020 08:41), Max: 99 (31 May 2020 08:41)  HR: 87 (31 May 2020 08:41) (79 - 87)  BP: 159/78 (31 May 2020 08:41) (134/60 - 159/78)  BP(mean): --  RR: 18 (31 May 2020 08:41) (18 - 18)  SpO2: 99% (31 May 2020 08:41) (96% - 99%)    PHYSICAL EXAM:    General: elderly, pleasant, slightly dry MM  HEENT: NC/AT;   Neck: supple  Cardiovascular: +S1/S2; RRR  Respiratory: CTA b/l; decreased at bases  Gastrointestinal: soft, NT/ND; +BSx4  Extremities: WWP    MEDICATIONS:  MEDICATIONS  (STANDING):  enoxaparin Injectable 30 milliGRAM(s) SubCutaneous every 24 hours  nystatin    Suspension 486903 Unit(s) Oral four times a day    MEDICATIONS  (PRN):  acetaminophen   Tablet .. 650 milliGRAM(s) Oral every 6 hours PRN Moderate Pain (4 - 6)      ALLERGIES:  Allergies    No Known Allergies    Intolerances        LABS:                        14.0   8.68  )-----------( 178      ( 31 May 2020 05:51 )             43.8     05-31    143  |  108  |  29<H>  ----------------------------<  99  4.2   |  22  |  1.47<H>    Ca    10.1      31 May 2020 05:51  Phos  2.4     05-31  Mg     2.1     05-31    TPro  7.3  /  Alb  3.7  /  TBili  1.0  /  DBili  x   /  AST  27  /  ALT  13  /  AlkPhos  54  05-29      Urinalysis Basic - ( 29 May 2020 13:09 )    Color: Yellow / Appearance: Clear / SG: >=1.030 / pH: x  Gluc: x / Ketone: Trace mg/dL  / Bili: Negative / Urobili: 0.2 E.U./dL   Blood: x / Protein: Trace mg/dL / Nitrite: NEGATIVE   Leuk Esterase: NEGATIVE / RBC: < 5 /HPF / WBC < 5 /HPF   Sq Epi: x / Non Sq Epi: 0-5 /HPF / Bacteria: Present /HPF      CAPILLARY BLOOD GLUCOSE          RADIOLOGY & ADDITIONAL TESTS: Reviewed.    ASSESSMENT:    PLAN:

## 2020-06-01 ENCOUNTER — OUTPATIENT (OUTPATIENT)
Dept: OUTPATIENT SERVICES | Facility: HOSPITAL | Age: 85
LOS: 1 days | End: 2020-06-01
Payer: MEDICAID

## 2020-06-01 DIAGNOSIS — Z98.890 OTHER SPECIFIED POSTPROCEDURAL STATES: Chronic | ICD-10-CM

## 2020-06-01 LAB
ANION GAP SERPL CALC-SCNC: 12 MMOL/L — SIGNIFICANT CHANGE UP (ref 5–17)
BUN SERPL-MCNC: 21 MG/DL — SIGNIFICANT CHANGE UP (ref 7–23)
CALCIUM SERPL-MCNC: 10 MG/DL — SIGNIFICANT CHANGE UP (ref 8.4–10.5)
CHLORIDE SERPL-SCNC: 108 MMOL/L — SIGNIFICANT CHANGE UP (ref 96–108)
CO2 SERPL-SCNC: 22 MMOL/L — SIGNIFICANT CHANGE UP (ref 22–31)
CREAT SERPL-MCNC: 1.34 MG/DL — HIGH (ref 0.5–1.3)
GLUCOSE SERPL-MCNC: 104 MG/DL — HIGH (ref 70–99)
MAGNESIUM SERPL-MCNC: 2.1 MG/DL — SIGNIFICANT CHANGE UP (ref 1.6–2.6)
POTASSIUM SERPL-MCNC: 4.1 MMOL/L — SIGNIFICANT CHANGE UP (ref 3.5–5.3)
POTASSIUM SERPL-SCNC: 4.1 MMOL/L — SIGNIFICANT CHANGE UP (ref 3.5–5.3)
SODIUM SERPL-SCNC: 142 MMOL/L — SIGNIFICANT CHANGE UP (ref 135–145)

## 2020-06-01 PROCEDURE — 99233 SBSQ HOSP IP/OBS HIGH 50: CPT | Mod: GC

## 2020-06-01 RX ORDER — AMLODIPINE BESYLATE 2.5 MG/1
5 TABLET ORAL DAILY
Refills: 0 | Status: DISCONTINUED | OUTPATIENT
Start: 2020-06-01 | End: 2020-06-02

## 2020-06-01 RX ADMIN — ENOXAPARIN SODIUM 30 MILLIGRAM(S): 100 INJECTION SUBCUTANEOUS at 23:42

## 2020-06-01 RX ADMIN — Medication 500000 UNIT(S): at 02:14

## 2020-06-01 RX ADMIN — Medication 650 MILLIGRAM(S): at 09:30

## 2020-06-01 RX ADMIN — Medication 500000 UNIT(S): at 18:18

## 2020-06-01 RX ADMIN — AMLODIPINE BESYLATE 5 MILLIGRAM(S): 2.5 TABLET ORAL at 09:30

## 2020-06-01 RX ADMIN — Medication 500000 UNIT(S): at 06:26

## 2020-06-01 RX ADMIN — Medication 500000 UNIT(S): at 23:38

## 2020-06-01 RX ADMIN — Medication 500000 UNIT(S): at 12:10

## 2020-06-01 NOTE — PROGRESS NOTE ADULT - PROBLEM SELECTOR PLAN 2
resolved, pre-renal, improved w/ IVF
-Cr with baseline at 1.17. Cr of 1.82 on admission, downtrended to 1.52 today  -daily BMP, seems to be downtrending s/p fluids supporting pre-renal CAYLA
-Cr with baseline at 1.17. Cr of 1.82 on admission, downtrended to 1.34 today  -daily BMP, seems to be downtrending s/p fluids and appropriate po intake supporting pre-renal CAYLA

## 2020-06-01 NOTE — OCCUPATIONAL THERAPY INITIAL EVALUATION ADULT - PLANNED THERAPY INTERVENTIONS, OT EVAL
fine motor coordination training/neuromuscular re-education/cognitive, visual perceptual/stretching/ADL retraining/IADL retraining/bed mobility training/strengthening/balance training/motor coordination training/transfer training

## 2020-06-01 NOTE — PROGRESS NOTE ADULT - PROBLEM SELECTOR PLAN 4
home med metoprolol tartrate 25 mg daily   unclear reason patient on this med given minimal effect on BP and not typically dosed daily  hold for now
home med metoprolol tartrate 25 mg daily   unclear reason patient on this med given minimal effect on BP and not typically dosed daily, holding metoprolol  Started on 5mg amlodipine for bp control

## 2020-06-01 NOTE — PROGRESS NOTE ADULT - PROBLEM SELECTOR PLAN 6
moderate per echo in [employer]scripts 2019  increases risk for fluid overload -- monitor volume status closely

## 2020-06-01 NOTE — OCCUPATIONAL THERAPY INITIAL EVALUATION ADULT - GENERAL OBSERVATIONS, REHAB EVAL
Pt's RN Chandni aware of intent to eval/tx; cleared Pt. Pt received in supine - +heplock. Pt with fair affect and agreeable to OT.

## 2020-06-01 NOTE — PROGRESS NOTE ADULT - ATTENDING COMMENTS
Patient was seen and examined with the resident team today.  I agree with Dr. Dougherty's assessment and plan with the following exceptions/additions:     Briefly, this is an 89yo woman with a PMH of moderate MR, mild dementia and glaucoma who p/w generalized weakness, subsequently found to have dehydration c/b CAYLA, which resolved with IVFs.  Now waiting for insurance authorization in order to transfer to [facility].    -- encourage PO intake  -- PT PRN   -- delirium precautions given age   -- c/w Amlodipine  -- DVT PPx - Lovenox  -- Dispo - [facility], medically ready     Alena Urena  [phone]
Pending FEI

## 2020-06-01 NOTE — PROGRESS NOTE ADULT - PROBLEM SELECTOR PLAN 9
thrush suspected on mouth examination o/n, started on nystatin s&s;   - f/u HgA1c
thrush suspected on mouth examination o/n, started on nystatin s&s;   - HgA1c 5.7

## 2020-06-01 NOTE — OCCUPATIONAL THERAPY INITIAL EVALUATION ADULT - LIVES WITH, PROFILE
"Chief Complaint   Patient presents with   • Gestational Diabetes     New Patient - 28 weeks, first pregnancy        Referring Provider  Ann Marie Sewell, CAROLE     HPI   Val Travis is a 23 y.o. female had concerns including Gestational Diabetes (New Patient - 28 weeks, first pregnancy).    Gestational diabetes was diagnosed last week.  OGTT results: From 3/12/2020 fasting BG 73, 1 hour 189, 2-hour 194, 3-hour 187  Prior GDM: No, first pregnancy  Weeks of gestation: 28 weeks  Gender: boy    Diet: was eating \"whatever I wanted to\"  Since diagnosis has changed her diet.   Breakfast: now two eggs, with PB toast and almond milk  Lunch: varies - eggs or sandwich, yogurt  Supper: varies - hot dogs with salad and cheese, or more eggs, multigrain waffle with PB  Drink: water or cup of almond milk.     Over the last week or so she has developed heat intolerance worsening anxiety and in the office today she has tachycardia.  It appears from a prior ER visit in December she also had tachycardia but was being seen for the flu.      Past Medical History:   Diagnosis Date   • Gestational diabetes      History reviewed. No pertinent surgical history.   Family History   Problem Relation Age of Onset   • Mental illness Mother    • Heart attack Father    • Hypertension Father    • Hyperlipidemia Father    • Obesity Father    • Diabetes Father       Social History     Socioeconomic History   • Marital status:      Spouse name: Not on file   • Number of children: Not on file   • Years of education: Not on file   • Highest education level: Not on file   Tobacco Use   • Smoking status: Former Smoker     Last attempt to quit: 2015     Years since quittin.2   • Smokeless tobacco: Never Used   Substance and Sexual Activity   • Alcohol use: Not Currently   • Drug use: Never   • Sexual activity: Yes     Partners: Male      No Known Allergies   Current Outpatient Medications on File Prior to Visit   Medication Sig Dispense Refill " "  • cetirizine (zyrTEC) 10 MG tablet Take 10 mg by mouth Daily.     • Prenatal Vit-Fe Fumarate-FA (PRENATAL, CLASSIC, VITAMIN) 28-0.8 MG tablet tablet Take  by mouth Daily.     • [DISCONTINUED] ondansetron ODT (ZOFRAN-ODT) 4 MG disintegrating tablet Take 1 tablet by mouth Every 6 (Six) Hours As Needed for Nausea. 12 tablet 0   • [DISCONTINUED] oseltamivir (TAMIFLU) 75 MG capsule Take 1 capsule by mouth 2 (Two) Times a Day. 10 capsule 0     No current facility-administered medications on file prior to visit.         Review of Systems   Constitutional: Positive for fatigue. Negative for unexpected weight gain.   HENT: Positive for postnasal drip. Negative for congestion.    Eyes: Negative.    Respiratory: Positive for shortness of breath. Negative for cough.    Cardiovascular: Negative.    Gastrointestinal: Positive for GERD and indigestion.   Endocrine: Negative.    Genitourinary: Positive for pelvic pain and vaginal discharge.   Musculoskeletal: Positive for back pain. Negative for myalgias.   Skin: Positive for dry skin. Negative for rash.   Allergic/Immunologic: Negative.    Neurological: Positive for headache. Negative for dizziness.   Hematological: Negative.    Psychiatric/Behavioral: Negative for depressed mood. The patient is nervous/anxious.       ROS was reviewed and verified. All other ROS negative.    /82 (BP Location: Left arm, Patient Position: Sitting, Cuff Size: Adult)   Pulse (!) 137   Ht 160 cm (63\")   Wt 78.1 kg (172 lb 3.2 oz)   LMP 08/25/2019   SpO2 98%   BMI 30.50 kg/m²      Physical Exam  Constitutional:  well developed; well nourished  no acute distress  facial plethora   ENT/Thyroid: no thyromegaly  no palpable nodules   Eyes: EOM intact  Conjunctiva: clear   Respiratory:  breathing is unlabored  clear to auscultation bilaterally   Cardiovascular:  tachycardic, regular, 2/6 CARLA    Chest:  Not performed.   Abdomen: Not performed.   : Not performed.   Musculoskeletal: negative " findings:  ROM of all joints is normal, no deformities present   Skin: warm, boggy   Neuro: normal without focal findings, mental status, speech normal, alert and oriented x3 and LENNOX   Psych: oriented to time, place and person, mood and affect are within normal limits     CMP:  Lab Results   Component Value Date    CREATININE 0.50 (L) 2020    BILITOT <0.2 (L) 2020    ALKPHOS 61 2020    AST 14 2020    ALT 9 2020     HbA1c:  Lab Results   Component Value Date    HGBA1C 5.6 2020     Glucose:  Lab Results   Component Value Date    POCGLU 96 2020       Assessment and Plan    Val was seen today for gestational diabetes.    Diagnoses and all orders for this visit:    Gestational diabetes mellitus (GDM) in third trimester, gestational diabetes method of control unspecified  28 weeks with her first pregnancy.  Management of gestational diabetes was discussed in detail.  Patient was provided with dietary guidelines including carbohydrate targets with meals/snacks as well as BG targets were provided: Fasting less than 95, 2 hours postprandial less than 140, 1 hour postprandial less than 120.    Potential complications related to uncontrolled gestational diabetes were also discussed including, but not limited to, LGA and macrosomia, stillbirth, polyhydramnios,  morbidity including hypoglycemia, increased risk for DM2 beyond pregnancy and risk for GDM with future pregnancies.  Discussed expected changes and potential increases in blood sugar during pregnancy.  I have asked that she monitor her BG's 6-8 times per day, fasting, 1 hour postprandial, 2 hours postprandial and nightly and return in 2 weeks for follow-up.  If BG's are not at target despite dietary modifications, will add medication. Pt was advised to contact our office prior to follow-up if BGs are not at target.   If insurance does not cover strips adequately, she may need to purchase an over-the-counter meter and  strips in order to monitor BG's as directed.  -     POC Glycosylated Hemoglobin (Hb A1C)  -     POC Glucose    Heat intolerance  With tachycardia, facial plethora, increased anxiety. I have some concern for possible hyperthyroidism. TSH and free T4 today.    Return in about 1 month (around 4/18/2020) for next scheduled follow up. The patient was instructed to contact the clinic with any interval questions or concerns.    Kasey Thompson, DO   Endocrinologist    Please note that portions of this document were completed with a voice recognition program. Efforts were made to edit the dictations, but occasionally words are mis-transcribed.     alone

## 2020-06-01 NOTE — PROGRESS NOTE ADULT - PROBLEM SELECTOR PLAN 8
from talking to niece/ HHA appears to be at baseline  CT no acute findings -- chronic microvascular disease/ chronic cerebellar infarct  AO x 2-3  f/u B12, TSH
from talking to niece/ HHA appears to be at baseline  CT no acute findings -- chronic microvascular disease/ chronic cerebellar infarct  AO x 2-3  B12, TSH wnl

## 2020-06-01 NOTE — PROGRESS NOTE ADULT - PROBLEM SELECTOR PLAN 5
downtrended
.02 on admission --> .01  no chest pain or ischemic changes on ekg  may be in setting of renal insuffiency  not trending further given level peaked and no cardiac symptoms
.02 on admission --> .01  no chest pain or ischemic changes on ekg  may be in setting of renal insuffiency  not trending further given level peaked and no cardiac symptoms

## 2020-06-01 NOTE — PROGRESS NOTE ADULT - PROBLEM SELECTOR PLAN 10
Patient A&O X 4. VSS. NPO until 1130 after NEELIMA/cardioversion, had PO intake afterwards without issue. Denies any pain, dizziness, lightheadedness, shortness of breath.     DISCHARGE   Discharged to: Home  Via: Automobile  Accompanied by: Family  Discharge Instructions: diet, activity, medications, follow up appointments, when to call the MD, and what to watchout for (i.e. s/s of infection, increasing SOB, palpitations, chest pain,)  Prescriptions: To be filled discharge pharmacy per pt's request; medication list reviewed & sent with pt  Follow Up Appointments: arranged; information given  Belongings: All sent with pt  IV: Port-A-Cath hep locked and de-accessed.  Telemetry: off  Pt exhibits understanding of above discharge instructions; all questions answered.  Discharge Paperwork: faxed    
F: None  N: pureed   DVT: lovenox  FULL CODE (unable to get answer from pt but niece states patient wants to live to 100 is unaware of any DNR/DNI)  DISPO: [facility]
F: None  N: pureed   DVT: lovenox  FULL CODE   [facility]

## 2020-06-01 NOTE — PROGRESS NOTE ADULT - PROBLEM SELECTOR PLAN 3
PT consulted, recommended FEI
per HHA has been progressive over unclear amount of time  no reported falls/trauma  5/5 strength in all extremities   PT consulted, recommended FEI
per HHA has been progressive over unclear amount of time  no reported falls/trauma  5/5 strength in all extremities   PT consulted, recommended FEI

## 2020-06-01 NOTE — PROGRESS NOTE ADULT - SUBJECTIVE AND OBJECTIVE BOX
OVERNIGHT EVENTS: No acute events overnight.    SUBJECTIVE / INTERVAL HPI: Patient seen and examined at bedside. Patient without any complaints this morning. States that the Tylenol has been helping her lower extremity and left hip pain. Denies fevers/chills, chest pain, sob, abdominal pain, nausea/vomiting.     VITAL SIGNS:  Vital Signs Last 24 Hrs  T(C): 36.6 (01 Jun 2020 09:03), Max: 36.9 (31 May 2020 20:50)  T(F): 97.8 (01 Jun 2020 09:03), Max: 98.4 (31 May 2020 20:50)  HR: 80 (01 Jun 2020 09:03) (80 - 81)  BP: 159/88 (01 Jun 2020 09:03) (159/88 - 162/85)  BP(mean): --  RR: 18 (01 Jun 2020 09:03) (18 - 18)  SpO2: 100% (01 Jun 2020 09:03) (100% - 100%)    PHYSICAL EXAM:    General: Elderly female, pleasant, laying comfortably in bed.  HEENT: NC/AT; left eye clouded and not reactive to light, anicteric sclera; MMM  Neck: supple  Cardiovascular: +S1/S2; RRR  Respiratory: CTA B/L; no W/R/R  Gastrointestinal: soft, NT/ND; +BSx4  Extremities: WWP; no edema, clubbing or cyanosis  Vascular: 2+ radial, DP/PT pulses B/L  Neurological: AAOx3; no focal deficits    MEDICATIONS:  MEDICATIONS  (STANDING):  amLODIPine   Tablet 5 milliGRAM(s) Oral daily  enoxaparin Injectable 30 milliGRAM(s) SubCutaneous every 24 hours  nystatin    Suspension 177081 Unit(s) Oral four times a day    MEDICATIONS  (PRN):  acetaminophen   Tablet .. 650 milliGRAM(s) Oral every 6 hours PRN Moderate Pain (4 - 6)      ALLERGIES:  Allergies    No Known Allergies    Intolerances        LABS:                        14.0   8.68  )-----------( 178      ( 31 May 2020 05:51 )             43.8     06-01    142  |  108  |  21  ----------------------------<  104<H>  4.1   |  22  |  1.34<H>    Ca    10.0      01 Jun 2020 06:54  Phos  2.4     05-31  Mg     2.1     06-01          CAPILLARY BLOOD GLUCOSE          RADIOLOGY & ADDITIONAL TESTS: Reviewed.

## 2020-06-01 NOTE — PROGRESS NOTE ADULT - PROBLEM SELECTOR PLAN 1
volume resuscitated, Cr downtrending, encourage PO intake
per HHA has not been eating much recently   per ED provider appeared dry on exam although now with MMM  also with trace ketones in urine and high specific gravity  s/p gentle hydration with Cr now improving. D/c fluids  nutrition consulted, recs appreciated  orthostatics negative
per HHA has not been eating much recently   per ED provider appeared dry on exam although now with MMM  also with trace ketones in urine and high specific gravity  s/p gentle hydration with Cr now improving. D/c fluids  nutrition consulted, recs appreciated  orthostatics negative    #FTT  Patient lives alone with HHA  PT recommending FEI, pt awaiting placement

## 2020-06-01 NOTE — PROGRESS NOTE ADULT - PROBLEM SELECTOR PLAN 7
asymmetrical ocular lens noted on CT head -- replaced left lens is eccentric  Will need optho outpatient f/u    #decreased vision: see above
asymmetrical ocular lens noted on CT head -- replaced left lens is eccentric  Will need optho outpatient f/u    #decreased vision: see above

## 2020-06-01 NOTE — OCCUPATIONAL THERAPY INITIAL EVALUATION ADULT - ADDITIONAL COMMENTS
Pt lives alone in apt complex w/ elevator and 3 stairs to enter. Pt reports independence in mobility and ADLs. Had HHA assist for 4hrs/dy. Uses a cane at baseline; reports possession of commode and shower seat.

## 2020-06-02 ENCOUNTER — TRANSCRIPTION ENCOUNTER (OUTPATIENT)
Age: 85
End: 2020-06-02

## 2020-06-02 VITALS
SYSTOLIC BLOOD PRESSURE: 116 MMHG | HEART RATE: 99 BPM | OXYGEN SATURATION: 97 % | RESPIRATION RATE: 18 BRPM | TEMPERATURE: 97 F | DIASTOLIC BLOOD PRESSURE: 74 MMHG

## 2020-06-02 LAB
ANION GAP SERPL CALC-SCNC: 14 MMOL/L — SIGNIFICANT CHANGE UP (ref 5–17)
BUN SERPL-MCNC: 17 MG/DL — SIGNIFICANT CHANGE UP (ref 7–23)
CALCIUM SERPL-MCNC: 9.8 MG/DL — SIGNIFICANT CHANGE UP (ref 8.4–10.5)
CHLORIDE SERPL-SCNC: 107 MMOL/L — SIGNIFICANT CHANGE UP (ref 96–108)
CO2 SERPL-SCNC: 19 MMOL/L — LOW (ref 22–31)
CREAT SERPL-MCNC: 1.22 MG/DL — SIGNIFICANT CHANGE UP (ref 0.5–1.3)
GLUCOSE SERPL-MCNC: 109 MG/DL — HIGH (ref 70–99)
HCT VFR BLD CALC: 44.8 % — SIGNIFICANT CHANGE UP (ref 34.5–45)
HGB BLD-MCNC: 14.4 G/DL — SIGNIFICANT CHANGE UP (ref 11.5–15.5)
MAGNESIUM SERPL-MCNC: 2 MG/DL — SIGNIFICANT CHANGE UP (ref 1.6–2.6)
MCHC RBC-ENTMCNC: 30.3 PG — SIGNIFICANT CHANGE UP (ref 27–34)
MCHC RBC-ENTMCNC: 32.1 GM/DL — SIGNIFICANT CHANGE UP (ref 32–36)
MCV RBC AUTO: 94.3 FL — SIGNIFICANT CHANGE UP (ref 80–100)
NRBC # BLD: 0 /100 WBCS — SIGNIFICANT CHANGE UP (ref 0–0)
PHOSPHATE SERPL-MCNC: 2.4 MG/DL — LOW (ref 2.5–4.5)
PLATELET # BLD AUTO: 157 K/UL — SIGNIFICANT CHANGE UP (ref 150–400)
POTASSIUM SERPL-MCNC: 4.5 MMOL/L — SIGNIFICANT CHANGE UP (ref 3.5–5.3)
POTASSIUM SERPL-SCNC: 4.5 MMOL/L — SIGNIFICANT CHANGE UP (ref 3.5–5.3)
RBC # BLD: 4.75 M/UL — SIGNIFICANT CHANGE UP (ref 3.8–5.2)
RBC # FLD: 13.2 % — SIGNIFICANT CHANGE UP (ref 10.3–14.5)
SODIUM SERPL-SCNC: 140 MMOL/L — SIGNIFICANT CHANGE UP (ref 135–145)
WBC # BLD: 8.43 K/UL — SIGNIFICANT CHANGE UP (ref 3.8–10.5)
WBC # FLD AUTO: 8.43 K/UL — SIGNIFICANT CHANGE UP (ref 3.8–10.5)

## 2020-06-02 PROCEDURE — 70450 CT HEAD/BRAIN W/O DYE: CPT

## 2020-06-02 PROCEDURE — 82553 CREATINE MB FRACTION: CPT

## 2020-06-02 PROCEDURE — 81001 URINALYSIS AUTO W/SCOPE: CPT

## 2020-06-02 PROCEDURE — 87635 SARS-COV-2 COVID-19 AMP PRB: CPT

## 2020-06-02 PROCEDURE — 97116 GAIT TRAINING THERAPY: CPT

## 2020-06-02 PROCEDURE — 84300 ASSAY OF URINE SODIUM: CPT

## 2020-06-02 PROCEDURE — 71045 X-RAY EXAM CHEST 1 VIEW: CPT

## 2020-06-02 PROCEDURE — 82570 ASSAY OF URINE CREATININE: CPT

## 2020-06-02 PROCEDURE — 97530 THERAPEUTIC ACTIVITIES: CPT

## 2020-06-02 PROCEDURE — 87086 URINE CULTURE/COLONY COUNT: CPT

## 2020-06-02 PROCEDURE — 83735 ASSAY OF MAGNESIUM: CPT

## 2020-06-02 PROCEDURE — 83036 HEMOGLOBIN GLYCOSYLATED A1C: CPT

## 2020-06-02 PROCEDURE — 99285 EMERGENCY DEPT VISIT HI MDM: CPT

## 2020-06-02 PROCEDURE — 71046 X-RAY EXAM CHEST 2 VIEWS: CPT

## 2020-06-02 PROCEDURE — 73502 X-RAY EXAM HIP UNI 2-3 VIEWS: CPT

## 2020-06-02 PROCEDURE — 72170 X-RAY EXAM OF PELVIS: CPT

## 2020-06-02 PROCEDURE — 80053 COMPREHEN METABOLIC PANEL: CPT

## 2020-06-02 PROCEDURE — 85730 THROMBOPLASTIN TIME PARTIAL: CPT

## 2020-06-02 PROCEDURE — 85610 PROTHROMBIN TIME: CPT

## 2020-06-02 PROCEDURE — 80048 BASIC METABOLIC PNL TOTAL CA: CPT

## 2020-06-02 PROCEDURE — 82607 VITAMIN B-12: CPT

## 2020-06-02 PROCEDURE — 36415 COLL VENOUS BLD VENIPUNCTURE: CPT

## 2020-06-02 PROCEDURE — 84100 ASSAY OF PHOSPHORUS: CPT

## 2020-06-02 PROCEDURE — 93005 ELECTROCARDIOGRAM TRACING: CPT

## 2020-06-02 PROCEDURE — 97161 PT EVAL LOW COMPLEX 20 MIN: CPT

## 2020-06-02 PROCEDURE — 85025 COMPLETE CBC W/AUTO DIFF WBC: CPT

## 2020-06-02 PROCEDURE — 99239 HOSP IP/OBS DSCHRG MGMT >30: CPT | Mod: GC

## 2020-06-02 PROCEDURE — 84443 ASSAY THYROID STIM HORMONE: CPT

## 2020-06-02 PROCEDURE — 97535 SELF CARE MNGMENT TRAINING: CPT

## 2020-06-02 PROCEDURE — 85027 COMPLETE CBC AUTOMATED: CPT

## 2020-06-02 PROCEDURE — 82550 ASSAY OF CK (CPK): CPT

## 2020-06-02 PROCEDURE — 84484 ASSAY OF TROPONIN QUANT: CPT

## 2020-06-02 RX ORDER — ACETAMINOPHEN 500 MG
2 TABLET ORAL
Qty: 0 | Refills: 0 | DISCHARGE
Start: 2020-06-02

## 2020-06-02 RX ORDER — NYSTATIN 500MM UNIT
5 POWDER (EA) MISCELLANEOUS
Qty: 0 | Refills: 0 | DISCHARGE
Start: 2020-06-02

## 2020-06-02 RX ORDER — METOPROLOL TARTRATE 50 MG
1 TABLET ORAL
Qty: 0 | Refills: 0 | DISCHARGE

## 2020-06-02 RX ORDER — AMLODIPINE BESYLATE 2.5 MG/1
1 TABLET ORAL
Qty: 0 | Refills: 0 | DISCHARGE
Start: 2020-06-02

## 2020-06-02 RX ADMIN — Medication 500000 UNIT(S): at 11:16

## 2020-06-02 RX ADMIN — Medication 650 MILLIGRAM(S): at 05:47

## 2020-06-02 RX ADMIN — Medication 500000 UNIT(S): at 05:47

## 2020-06-02 RX ADMIN — AMLODIPINE BESYLATE 5 MILLIGRAM(S): 2.5 TABLET ORAL at 05:47

## 2020-06-02 NOTE — DISCHARGE NOTE PROVIDER - NSDCMRMEDTOKEN_GEN_ALL_CORE_FT
Metoprolol Tartrate 25 mg oral tablet: 1 tab(s) orally once a day acetaminophen 325 mg oral tablet: 2 tab(s) orally every 6 hours, As needed, Moderate Pain (4 - 6)  amLODIPine 5 mg oral tablet: 1 tab(s) orally once a day  nystatin 100,000 units/mL oral suspension: 5 milliliter(s) orally 4 times a day

## 2020-06-02 NOTE — PROGRESS NOTE ADULT - SUBJECTIVE AND OBJECTIVE BOX
Physical Medicine and Rehabilitation Progress Note:    Patient is a 89y old  Female who presents with a chief complaint of volume depletion (02 Jun 2020 11:31)      HPI:  88 y/o F PMH moderate MR, mild dementia, HTN who presented from home after her aide found her on her knees and patient had difficulty getting up. Patient states she was in daily prayer and denies fall/trauma. Patient states she takes one pill for blood pressure but does not know the name. Does not feel weak or ill at this time. Limited ability to interview due to dementia but cannot elicit any complaints. Was able to speak to home health aide (Ms. Houser, [phone]) who states the patient has been getting progressively weaker and more demented over time but no specific illness/events.    ED vitals: afebrile, p 84, 115/68, RR 17, 100% on RA  ED labs: Cr 1.82, BUN 39, trop .02 -> .01, trace ketones in urine  CT head: no acute findings  CXR: Bilateral interstitial lung disease  EKG: NSR at 68  Started on NS @ 120 (29 May 2020 17:19)                            14.4   8.43  )-----------( 157      ( 02 Jun 2020 06:38 )             44.8       06-02    140  |  107  |  17  ----------------------------<  109<H>  4.5   |  19<L>  |  1.22    Ca    9.8      02 Jun 2020 06:38  Phos  2.4     06-02  Mg     2.0     06-02      Vital Signs Last 24 Hrs  T(C): 36.3 (02 Jun 2020 09:48), Max: 36.5 (02 Jun 2020 05:53)  T(F): 97.4 (02 Jun 2020 09:48), Max: 97.7 (02 Jun 2020 05:53)  HR: 99 (02 Jun 2020 09:48) (76 - 99)  BP: 116/74 (02 Jun 2020 09:48) (102/57 - 131/75)  BP(mean): --  RR: 18 (02 Jun 2020 09:48) (18 - 20)  SpO2: 97% (02 Jun 2020 09:48) (97% - 100%)    MEDICATIONS  (STANDING):  amLODIPine   Tablet 5 milliGRAM(s) Oral daily  enoxaparin Injectable 30 milliGRAM(s) SubCutaneous every 24 hours  nystatin    Suspension 328320 Unit(s) Oral four times a day    MEDICATIONS  (PRN):  acetaminophen   Tablet .. 650 milliGRAM(s) Oral every 6 hours PRN Moderate Pain (4 - 6)    Currently Undergoing Physical Therapy at bedside.    Functional Status Assessment:        Pain Assessment/Number Scale (0-10) Adult  Presence of Pain: complains of pain/discomfort  Body Location: Left:   hip   thigh   leg    Therapeutic Interventions      Bed Mobility  Bed Mobility Training Supine-to-Sit: supervision;  supervision;  verbal cues  Bed Mobility Training Limitations: decreased ability to use legs for bridging/pushing;  pain    Sit-Stand Transfer Training  Transfer Training Stand-to-Sit Transfer: contact guard;  verbal cues;  1 person assist;  full weight-bearing   rolling walker  Sit-to-Stand Transfer Training Transfer Safety Analysis: pain;  rolling walker    Gait Training  Gait Training: minimum assist (75% patient effort);  verbal cues;  1 person assist;  full weight-bearing   rolling walker;  25 feet  Gait Analysis: 2-point gait   decreased lewis;  increased time in double stance;  decreased hip/knee flexion;  decreased step length;  decreased stride length;  pain;  25 feet;  rolling walker  Gait Number of Times:: x 1    Therapeutic Exercise  Therapeutic Exercise Detail: LARoya, seated marching x 10             PM&R Impression: as above    Current Disposition Plan Recommendations: subacute rehab placement

## 2020-06-02 NOTE — DISCHARGE NOTE PROVIDER - HOSPITAL COURSE
#Discharge: do not delete        88 y/o F PMH moderate MR, hypertension, mild dementia, glaucoma who presented with generalized weakness, admitted for hydration and assessment of safety of living alone, with labs showing an CAYLA.        Problem List/Main Diagnoses (system-based):     1) CAYLA: Patient presented with creatinine elevation to 1.82 from her baseline of 1.17. Patient appeared dry on exam and Cr downtrended back to baseline after short term IV fluid hydration and assistance with meals as patient is severely visually impaired.         2) Hypertension: Patient on metoprolol tartrate 20mg daily outpatient for hypertension. Stopped and started on amlodipine 5mg with improvement in bp control. Patient to continue this medication until she follows up with her primary care doctor.        3) FEI placement: Patient lives alone with a part-time health aide. Assessed by PT who recommend FEI.        4) Glaucoma: CT head from admission after fall with incidental finding of asymmetric appearance of a left ocular lens replacement, which patient will need to follow up with her outpatient ophthalmologist for.        New medications: D/c metoprolol, started amlodipine 5mg daily    Labs to be followed outpatient: BMP    Exam to be followed outpatient: General exam, optho exam #Discharge: do not delete        88 y/o F PMH moderate MR, hypertension, mild dementia, arthritis, glaucoma who presented with generalized weakness, admitted for hydration and assessment of safety of living alone, with labs showing an CAYLA.        Problem List/Main Diagnoses (system-based):     1) CAYLA: Patient presented with creatinine elevation to 1.82 from her baseline of 1.17. Patient appeared dry on exam and Cr downtrended back to baseline after short term IV fluid hydration and assistance with meals as patient is severely visually impaired.         2) Hypertension: Patient on metoprolol tartrate 20mg daily outpatient for hypertension. Stopped and started on amlodipine 5mg with improvement in bp control. Patient to continue this medication until she follows up with her primary care doctor.        3) FEI placement: Patient lives alone with a part-time health aide. Assessed by PT who recommend FEI.        4) Glaucoma: CT head from admission after fall with incidental finding of asymmetric appearance of a left ocular lens replacement, which patient will need to follow up with her outpatient ophthalmologist for.        5) Thrush: Found to have thrush on physical exam. Can continue nystatin 5ml 4x/day.         New medications: D/c metoprolol, started amlodipine 5mg daily    Labs to be followed outpatient: BMP    Exam to be followed outpatient: General exam, optho exam

## 2020-06-02 NOTE — DISCHARGE NOTE PROVIDER - NSDCFUADDAPPT_GEN_ALL_CORE_FT
Please follow up with your PCP upon discharge. Please also make sure to follow up with your ophthalmologist. Please bring your discharge paperwork with you.

## 2020-06-02 NOTE — DISCHARGE NOTE PROVIDER - PROVIDER TOKENS
FREE:[LAST:[[place]],FIRST:[Bobby],PHONE:[phone]],FAX:[(   )    -],ADDRESS:[[address] [place] [address]],FOLLOWUP:[2 weeks]]

## 2020-06-02 NOTE — PROGRESS NOTE ADULT - ASSESSMENT
89 y/o F PMH moderate MR, mild dementia, glaucoma who presented with generalized weakness, admitted for hydration and assessment of safety of living alone, PT now recommending FEI.
per Internal Medicine    88 y/o F PMH moderate MR, mild dementia, glaucoma who presented with generalized weakness, admitted for hydration and assessment of safety of living alone, PT now recommending FEI.      Problem/Plan - 1:  ·  Problem: Volume depletion.  Plan: per HHA has not been eating much recently   per ED provider appeared dry on exam although now with MMM  also with trace ketones in urine and high specific gravity  s/p gentle hydration with Cr now improving. D/c fluids  nutrition consulted, recs appreciated  orthostatics negative    #FTT  Patient lives alone with HHA  PT recommending FEI, pt awaiting placement.     Problem/Plan - 2:  ·  Problem: Acute kidney injury.  Plan: -Cr with baseline at 1.17. Cr of 1.82 on admission, downtrended to 1.34 today  -daily BMP, seems to be downtrending s/p fluids and appropriate po intake supporting pre-renal CAYLA.     Problem/Plan - 3:  ·  Problem: Weakness.  Plan: per HHA has been progressive over unclear amount of time  no reported falls/trauma  5/5 strength in all extremities   PT consulted, recommended FEI.     Problem/Plan - 4:  ·  Problem: Hypertension.  Plan: home med metoprolol tartrate 25 mg daily   unclear reason patient on this med given minimal effect on BP and not typically dosed daily, holding metoprolol  Started on 5mg amlodipine for bp control.     Problem/Plan - 5:  ·  Problem: Troponin level elevated.  Plan: .02 on admission --> .01  no chest pain or ischemic changes on ekg  may be in setting of renal insuffiency  not trending further given level peaked and no cardiac symptoms.     Problem/Plan - 6:  Problem: Mitral regurgitation. Plan: moderate per echo in allscripts 2019  increases risk for fluid overload -- monitor volume status closely.    Problem/Plan - 7:  ·  Problem: Glaucoma.  Plan: asymmetrical ocular lens noted on CT head -- replaced left lens is eccentric  Will need optho outpatient f/u    #decreased vision: see above.     Problem/Plan - 8:  ·  Problem: Dementia.  Plan: from talking to niece/ HHA appears to be at baseline  CT no acute findings -- chronic microvascular disease/ chronic cerebellar infarct  AO x 2-3  B12, TSH wnl.     Problem/Plan - 9:  ·  Problem: Thrush.  Plan: thrush suspected on mouth examination o/n, started on nystatin s&s;   - HgA1c 5.7.     Problem/Plan - 10:  Problem: Nutrition, metabolism, and development symptoms. Plan; F: None  N: pureed   DVT: lovenox  FULL CODE   DISPO: [facility].
89 y/o F PMH moderate MR, mild dementia, glaucoma who presented with generalized weakness, admitted for hydration and assessment of safety of living alone, PT now recommending FEI.
88 y/o F PMH moderate MR, mild dementia, glaucoma who presented with generalized weakness, admitted for hydration and assessment of safety of living alone, PT now recommending FEI.

## 2020-06-02 NOTE — DISCHARGE NOTE NURSING/CASE MANAGEMENT/SOCIAL WORK - PATIENT PORTAL LINK FT
You can access the FollowMyHealth Patient Portal offered by [facility] by registering at the following website: [email or web address]. By joining [facility]’s FollowMyHealth portal, you will also be able to view your health information using other applications (apps) compatible with our system.

## 2020-06-02 NOTE — DISCHARGE NOTE PROVIDER - NSDCCPCAREPLAN_GEN_ALL_CORE_FT
PRINCIPAL DISCHARGE DIAGNOSIS  Diagnosis: Dehydration  Assessment and Plan of Treatment: You came into the hospital as you were very weak. Your blood work suggested that you were dehydrated and because of this PRINCIPAL DISCHARGE DIAGNOSIS  Diagnosis: Dehydration  Assessment and Plan of Treatment: You came into the hospital as you were very weak. Your blood work suggested that you were dehydrated and because of this your kidneys were not functioning as well as they should have been. We gave you some IV fuids after which your kidney function returned to your baseline. Please make sure to drink at least 8 cups of water daily and eat at least three meals daily, which you will be assisted with at subacute rehab.      SECONDARY DISCHARGE DIAGNOSES  Diagnosis: Hypertension  Assessment and Plan of Treatment: You have a known history of high blood pressure. Prior to coming in to the hospial, you were taking metoprolol. Please stop this medication. We started you on 5mg amlodipine daily which we want you to continue until you see your primary care doctor.    Diagnosis: Thrush  Assessment and Plan of Treatment: We saw that you had some thrush which is a fungal infection of the tongue. We started you on a liquid called nystatin for it which you can continue taking four times per day.    Diagnosis: Glaucoma  Assessment and Plan of Treatment: A CT of your head showed an abnormal finding (asymmetric appearance of a left ocular lens replacement) of your eyes. Please make sure to follow up with your opthamologist (eye doctor.)

## 2020-06-02 NOTE — DISCHARGE NOTE PROVIDER - CARE PROVIDER_API CALL
Leventhal, Gerald  [address], [place] [address]  Phone: [phone]  Fax: (   )    -  Follow Up Time: 2 weeks

## 2020-06-04 DIAGNOSIS — N17.9 ACUTE KIDNEY FAILURE, UNSPECIFIED: ICD-10-CM

## 2020-06-04 DIAGNOSIS — E43 UNSPECIFIED SEVERE PROTEIN-CALORIE MALNUTRITION: ICD-10-CM

## 2020-06-04 DIAGNOSIS — H40.9 UNSPECIFIED GLAUCOMA: ICD-10-CM

## 2020-06-04 DIAGNOSIS — E86.0 DEHYDRATION: ICD-10-CM

## 2020-06-04 DIAGNOSIS — B37.0 CANDIDAL STOMATITIS: ICD-10-CM

## 2020-06-04 DIAGNOSIS — F03.90 UNSPECIFIED DEMENTIA WITHOUT BEHAVIORAL DISTURBANCE: ICD-10-CM

## 2020-06-04 DIAGNOSIS — I10 ESSENTIAL (PRIMARY) HYPERTENSION: ICD-10-CM

## 2020-06-04 DIAGNOSIS — I34.0 NONRHEUMATIC MITRAL (VALVE) INSUFFICIENCY: ICD-10-CM

## 2020-06-04 DIAGNOSIS — M19.90 UNSPECIFIED OSTEOARTHRITIS, UNSPECIFIED SITE: ICD-10-CM

## 2020-06-15 PROCEDURE — G9001: CPT

## 2020-06-27 ENCOUNTER — INPATIENT (INPATIENT)
Facility: HOSPITAL | Age: 85
LOS: 4 days | Discharge: NURSING, REHAB OR LONG-TERM CARE | DRG: 684 | End: 2020-07-02
Attending: HOSPITALIST | Admitting: HOSPITALIST
Payer: MEDICARE

## 2020-06-27 VITALS
RESPIRATION RATE: 18 BRPM | DIASTOLIC BLOOD PRESSURE: 50 MMHG | TEMPERATURE: 98 F | OXYGEN SATURATION: 95 % | SYSTOLIC BLOOD PRESSURE: 98 MMHG | HEART RATE: 77 BPM

## 2020-06-27 DIAGNOSIS — I10 ESSENTIAL (PRIMARY) HYPERTENSION: ICD-10-CM

## 2020-06-27 DIAGNOSIS — Z29.9 ENCOUNTER FOR PROPHYLACTIC MEASURES, UNSPECIFIED: ICD-10-CM

## 2020-06-27 DIAGNOSIS — R79.89 OTHER SPECIFIED ABNORMAL FINDINGS OF BLOOD CHEMISTRY: ICD-10-CM

## 2020-06-27 DIAGNOSIS — D72.829 ELEVATED WHITE BLOOD CELL COUNT, UNSPECIFIED: ICD-10-CM
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
## 2020-06-27 DIAGNOSIS — N17.9 ACUTE KIDNEY FAILURE, UNSPECIFIED: ICD-10-CM

## 2020-06-27 DIAGNOSIS — E83.52 HYPERCALCEMIA: ICD-10-CM

## 2020-06-27 DIAGNOSIS — Z98.890 OTHER SPECIFIED POSTPROCEDURAL STATES: Chronic | ICD-10-CM

## 2020-06-27 LAB
ALBUMIN SERPL ELPH-MCNC: 3.5 G/DL — SIGNIFICANT CHANGE UP (ref 3.3–5)
ALP SERPL-CCNC: 50 U/L — SIGNIFICANT CHANGE UP (ref 40–120)
ALT FLD-CCNC: 12 U/L — SIGNIFICANT CHANGE UP (ref 10–45)
ANION GAP SERPL CALC-SCNC: 12 MMOL/L — SIGNIFICANT CHANGE UP (ref 5–17)
AST SERPL-CCNC: 26 U/L — SIGNIFICANT CHANGE UP (ref 10–40)
BILIRUB SERPL-MCNC: 0.9 MG/DL — SIGNIFICANT CHANGE UP (ref 0.2–1.2)
BUN SERPL-MCNC: 22 MG/DL — SIGNIFICANT CHANGE UP (ref 7–23)
CALCIUM SERPL-MCNC: 10.1 MG/DL — SIGNIFICANT CHANGE UP (ref 8.4–10.5)
CHLORIDE SERPL-SCNC: 103 MMOL/L — SIGNIFICANT CHANGE UP (ref 96–108)
CO2 SERPL-SCNC: 21 MMOL/L — LOW (ref 22–31)
CREAT SERPL-MCNC: 1.76 MG/DL — HIGH (ref 0.5–1.3)
GLUCOSE SERPL-MCNC: 114 MG/DL — HIGH (ref 70–99)
HCT VFR BLD CALC: 40.3 % — SIGNIFICANT CHANGE UP (ref 34.5–45)
HGB BLD-MCNC: 13.2 G/DL — SIGNIFICANT CHANGE UP (ref 11.5–15.5)
MCHC RBC-ENTMCNC: 30.7 PG — SIGNIFICANT CHANGE UP (ref 27–34)
MCHC RBC-ENTMCNC: 32.8 GM/DL — SIGNIFICANT CHANGE UP (ref 32–36)
MCV RBC AUTO: 93.7 FL — SIGNIFICANT CHANGE UP (ref 80–100)
NRBC # BLD: 0 /100 WBCS — SIGNIFICANT CHANGE UP (ref 0–0)
PLATELET # BLD AUTO: 200 K/UL — SIGNIFICANT CHANGE UP (ref 150–400)
POTASSIUM SERPL-MCNC: 4.8 MMOL/L — SIGNIFICANT CHANGE UP (ref 3.5–5.3)
POTASSIUM SERPL-SCNC: 4.8 MMOL/L — SIGNIFICANT CHANGE UP (ref 3.5–5.3)
PROT SERPL-MCNC: 7.2 G/DL — SIGNIFICANT CHANGE UP (ref 6–8.3)
RBC # BLD: 4.3 M/UL — SIGNIFICANT CHANGE UP (ref 3.8–5.2)
RBC # FLD: 13.3 % — SIGNIFICANT CHANGE UP (ref 10.3–14.5)
SARS-COV-2 RNA SPEC QL NAA+PROBE: SIGNIFICANT CHANGE UP
SODIUM SERPL-SCNC: 136 MMOL/L — SIGNIFICANT CHANGE UP (ref 135–145)
TROPONIN T SERPL-MCNC: 0.02 NG/ML — HIGH (ref 0–0.01)
TROPONIN T SERPL-MCNC: 0.02 NG/ML — HIGH (ref 0–0.01)
WBC # BLD: 10.94 K/UL — HIGH (ref 3.8–10.5)
WBC # FLD AUTO: 10.94 K/UL — HIGH (ref 3.8–10.5)

## 2020-06-27 PROCEDURE — 71045 X-RAY EXAM CHEST 1 VIEW: CPT | Mod: 26

## 2020-06-27 PROCEDURE — 99285 EMERGENCY DEPT VISIT HI MDM: CPT

## 2020-06-27 PROCEDURE — 99223 1ST HOSP IP/OBS HIGH 75: CPT | Mod: GC

## 2020-06-27 PROCEDURE — 72125 CT NECK SPINE W/O DYE: CPT | Mod: 26

## 2020-06-27 PROCEDURE — 70450 CT HEAD/BRAIN W/O DYE: CPT | Mod: 26

## 2020-06-27 PROCEDURE — 73700 CT LOWER EXTREMITY W/O DYE: CPT | Mod: 26,LT

## 2020-06-27 RX ORDER — ENOXAPARIN SODIUM 100 MG/ML
30 INJECTION SUBCUTANEOUS EVERY 24 HOURS
Refills: 0 | Status: DISCONTINUED | OUTPATIENT
Start: 2020-06-27 | End: 2020-07-02

## 2020-06-27 RX ORDER — SODIUM CHLORIDE 9 MG/ML
500 INJECTION INTRAMUSCULAR; INTRAVENOUS; SUBCUTANEOUS ONCE
Refills: 0 | Status: COMPLETED | OUTPATIENT
Start: 2020-06-27 | End: 2020-06-27

## 2020-06-27 RX ORDER — SODIUM CHLORIDE 9 MG/ML
1000 INJECTION INTRAMUSCULAR; INTRAVENOUS; SUBCUTANEOUS
Refills: 0 | Status: DISCONTINUED | OUTPATIENT
Start: 2020-06-27 | End: 2020-06-28

## 2020-06-27 RX ADMIN — SODIUM CHLORIDE 500 MILLILITER(S): 9 INJECTION INTRAMUSCULAR; INTRAVENOUS; SUBCUTANEOUS at 18:15

## 2020-06-27 NOTE — ED PROVIDER NOTE - OBJECTIVE STATEMENT
pt endorses fall, and L hip pain. No cp, no n/v, no abd pain.   per EMS, pt endorsed fall last night, but ambulatory today w/ walker, and no complaints, house clean/organized; report visiting nurse came to visit pt and was concerned for pt's safety, felt needed add'l resources. Pt has had same home health aid for 4 years during day, and discharged home from rehab yesterday. 89F pmh MR, dementia, HTN, poor vision at baseline, recent admit for CAYLA & weakness, dc'd to rehab, BIBA for fall last night. Per pt, fell out of bed, denies hitting head, and L hip pain. No cp, no n/v, no abd pain.   Per EMS, pt endorsed fall last night, but ambulatory today w/ walker, and no complaints, house clean/organized; EMS reports visiting nurse came to visit pt and was concerned for pt's safety, felt needed add'l resources, likely 24hr care.   Pt has had same home health aid for 4 years during day, and discharged home from rehab yesterday.

## 2020-06-27 NOTE — H&P ADULT - PROBLEM SELECTOR PLAN 4
- F/u PTH and vitD in AM  - May have component of dehydration as supported by above labs  - Does have thyroid nodule noted on imaging, can obtain outpatient thyroid ultrasound

## 2020-06-27 NOTE — ED ADULT TRIAGE NOTE - CHIEF COMPLAINT QUOTE
BIBA from home reported mechanical fall yesterday as per EMS pt requires "increases hours as per visiting nurse." BIBA from home reported mechanical fall yesterday as per EMS pt requires "increased hours as per visiting nurse."

## 2020-06-27 NOTE — ED ADULT NURSE NOTE - OBJECTIVE STATEMENT
88 y/o female received into the ED, axox2, to person and place, not to time.  Pt. was sent by visiting nurse to the ED via ambulance due to safety concerns at home.  As per EMS, pt. was sent by [facility] because she is alone for many hours a day without having a home health aide and is in need of nursing home placement.  Pt. at this time states that she is having pain to the left hip due to falling out of bed last night.  No injuries noted to the hips or the legs.  Pt. states that she also has history of arthritis to the hip.  Pt. grimaces and complains of pain to the left hip upon movement.

## 2020-06-27 NOTE — ED PROVIDER NOTE - PHYSICAL EXAMINATION
VITAL SIGNS: I have reviewed nursing notes and confirm.  CONSTITUTIONAL: Well-developed; well-nourished; in no acute distress.  SKIN: Skin is warm and dry, no acute rash.  HEAD: Normocephalic; atraumatic.  EYES:  EOM intact; conjunctiva and sclera clear.  ENT: No nasal discharge; airway clear.  NECK: Supple; Voluntary FROM  CARD: No rubs appreciated, Regular rate and rhythm.  RESP: No wheezes, no rales. No respiratory distress  ABD: Soft; non-distended; non-tender; no rebound or guarding  Hip: no pain w/ rom b/l, no pain w/ logroll.   EXT: Normal ROM. No cyanosis or edema.  NEURO: Alert, oriented. Grossly unremarkable.  PSYCH: Cooperative, appropriate.

## 2020-06-27 NOTE — H&P ADULT - PROBLEM SELECTOR PLAN 3
- Likely due to dehydration consistent with previous creatinine range during last hospitalization, f/u in AM now s/p 500cc NS bolus. If remains elevated can send urine lytes for FeNa  - F/u UA, not endorsing urinary symptoms

## 2020-06-27 NOTE — H&P ADULT - PROBLEM SELECTOR PLAN 1
- S/p imaging without acute fracture  - PT consult  - SW consult  - May be due to dehydration, s/p IVF bolus as above. C/w gentle hydration  - May be due to hypotension, hold home amlodipine and f/u bp. F/u orthostatics

## 2020-06-27 NOTE — H&P ADULT - NSHPPHYSICALEXAM_GEN_ALL_CORE
.  VITAL SIGNS:  T(C): 36.7 (06-27-20 @ 19:51), Max: 36.7 (06-27-20 @ 19:51)  T(F): 98 (06-27-20 @ 19:51), Max: 98 (06-27-20 @ 19:51)  HR: 81 (06-27-20 @ 19:51) (77 - 81)  BP: 108/63 (06-27-20 @ 19:51) (98/50 - 108/63)  BP(mean): --  RR: 18 (06-27-20 @ 19:51) (18 - 18)  SpO2: 95% (06-27-20 @ 19:51) (95% - 95%)  Wt(kg): --    PHYSICAL EXAM:    Constitutional: elderly F resting comfortably in bed; NAD  Head: NC/AT  Eyes: EOMI, anicteric sclera  ENT: no nasal discharge  Neck: supple  Respiratory: CTA b/l, no increased work of breathing  Cardiac: +S1/S2, regular rate  Gastrointestinal: soft, NT/ND; no rebound or guarding; +BS  Extremities: WWP, no peripheral edema  Musculoskeletal: NROM x4  Dermatologic: skin warm, dry  Neurologic: Alert and oriented, no focal deficits appreciated  Psychiatric: affect and characteristics of appearance, verbalizations, behaviors are appropriate .  VITAL SIGNS:  T(C): 36.7 (06-27-20 @ 19:51), Max: 36.7 (06-27-20 @ 19:51)  T(F): 98 (06-27-20 @ 19:51), Max: 98 (06-27-20 @ 19:51)  HR: 81 (06-27-20 @ 19:51) (77 - 81)  BP: 108/63 (06-27-20 @ 19:51) (98/50 - 108/63)  BP(mean): --  RR: 18 (06-27-20 @ 19:51) (18 - 18)  SpO2: 95% (06-27-20 @ 19:51) (95% - 95%)  Wt(kg): --    PHYSICAL EXAM:    Constitutional: elderly F resting comfortably in bed; NAD  Head: NC/AT  Eyes: Glaucoma, baseline decreased vision  ENT: no nasal discharge  Neck: supple, thyroid nodule noted on R  Respiratory: CTA b/l, no increased work of breathing  Cardiac: +S1/S2, regular rate  Gastrointestinal: soft, NT/ND; no rebound or guarding; +BS  Extremities: WWP, no peripheral edema  Musculoskeletal: NROM x4  Dermatologic: skin warm, dry  Neurologic: Alert and oriented, no focal deficits appreciated  Psychiatric: affect and characteristics of appearance, verbalizations, behaviors are appropriate

## 2020-06-27 NOTE — ED PROVIDER NOTE - CLINICAL SUMMARY MEDICAL DECISION MAKING FREE TEXT BOX
89F pmh MR, dementia, HTN, poor vision at baseline, recent admit for CAYLA & weakness, dc'd to rehab, BIBA for fall last night. Per pt, fell out of bed, denies hitting head, and L hip pain. Family feels add'l resources needed at home. Exam nontoxic, no acute distress, no sig ttp L hip. Concern fall, dehydration, dementia, less likely ACS, consider cayla, electrolyte abnormality, Hip fx. 89F pmh MR, dementia, HTN, poor vision at baseline, recent admit for CAYLA & weakness, dc'd to rehab, BIBA for fall last night. Per pt, fell out of bed, denies hitting head, and L hip pain. Family feels add'l resources needed at home. Exam nontoxic, no acute distress, no sig ttp L hip. Concern fall, dehydration, dementia, less likely ACS, consider cayla, electrolyte abnormality, Hip fx, syncope. Labs noted for recurrence of CAYLA 1.7Cr, trop min elevation 0.02 (similar to prior, had resolved during previous visit), no acute ischemic changes on EKG. CT r/o fx. Anticipate admission.

## 2020-06-27 NOTE — H&P ADULT - NSHPLABSRESULTS_GEN_ALL_CORE
.  LABS:                         13.2   10.94 )-----------( 200      ( 27 Jun 2020 18:03 )             40.3     06-27    136  |  103  |  22  ----------------------------<  114<H>  4.8   |  21<L>  |  1.76<H>    Ca    10.1      27 Jun 2020 18:02    TPro  7.2  /  Alb  3.5  /  TBili  0.9  /  DBili  x   /  AST  26  /  ALT  12  /  AlkPhos  50  06-27        CARDIAC MARKERS ( 27 Jun 2020 20:57 )  x     / 0.02 ng/mL / x     / x     / x      CARDIAC MARKERS ( 27 Jun 2020 18:02 )  x     / 0.02 ng/mL / x     / x     / x                RADIOLOGY, EKG & ADDITIONAL TESTS: Reviewed.

## 2020-06-27 NOTE — H&P ADULT - ASSESSMENT
89F PMH MR, dementia, HTN, admitted in setting of mechanical fall with anticipated need for increased support

## 2020-06-27 NOTE — H&P ADULT - PROBLEM SELECTOR PLAN 6
- 0.02, consistent with previous levels  - No ACS symptoms, EKG with no acute changes  - No need to trend trops at this time as clinical symptoms, labs, EKG not suggestive of ACS

## 2020-06-27 NOTE — ED PROVIDER NOTE - PRO INTERPRETER NEED 2
----- Message from Roosevelt Haque MD sent at 7/20/2017  9:32 PM CDT -----  X-ray unremarkable. Vascular changes noted. Suggest SHADE studies to further evaluate.   English

## 2020-06-27 NOTE — H&P ADULT - PROBLEM SELECTOR PLAN 2
- Does not meet SIRS criteria, likely dehydration given CAYLA and hypercalcemia  - S/p 500cc NS bolus, will give 50cc/hr NS gentle hydration in setting of no volume overload  - F/u CBC in AM. If patient worsens or becomes febrile, can consider antibiotic coverage and obtain blood cultures  - F/u CXR  - F/u UA

## 2020-06-27 NOTE — ED PROVIDER NOTE - PROGRESS NOTE DETAILS
[facility] called: [phone], no answer, msg left to please call back re pt. [facility] called: [phone], no answer, msg left to please call back re pt.  Carolyn (niece) returned call, noted concern for need for add'l help at home due to pt vision impaired/safety at home. SBp improved w/ 500cc ivf to received signout- awaiting ct results. plan for admission [facility] called: [phone], no answer, msg left to please call back re pt.  Carolyn (niece) returned call, noted concern for need for add'l help at home due to pt vision impaired/safety at home, unsteadiness.

## 2020-06-27 NOTE — H&P ADULT - NSHPREVIEWOFSYSTEMS_GEN_ALL_CORE
REVIEW OF SYSTEMS:    CONSTITUTIONAL: No fevers or chills  EYES/ENT: No visual changes;  No vertigo  NECK: No pain or stiffness  RESPIRATORY: No cough; No shortness of breath  CARDIOVASCULAR: No chest pain or palpitations  GASTROINTESTINAL: No abdominal or epigastric pain. No diarrhea  GENITOURINARY: No dysuria, frequency  MSK: hip pain, chronic  NEUROLOGICAL: No numbness or weakness  SKIN: No itching, burning  All other review of systems is negative unless indicated above.

## 2020-06-27 NOTE — ED ADULT NURSE NOTE - CHIEF COMPLAINT QUOTE
BIBA from home reported mechanical fall yesterday as per EMS pt requires "increases hours as per visiting nurse."

## 2020-06-28 PROBLEM — I34.0 NONRHEUMATIC MITRAL (VALVE) INSUFFICIENCY: Chronic | Status: ACTIVE | Noted: 2020-05-29

## 2020-06-28 LAB
24R-OH-CALCIDIOL SERPL-MCNC: 41.2 NG/ML — SIGNIFICANT CHANGE UP (ref 30–80)
ALBUMIN SERPL ELPH-MCNC: 3.5 G/DL — SIGNIFICANT CHANGE UP (ref 3.3–5)
ALP SERPL-CCNC: 51 U/L — SIGNIFICANT CHANGE UP (ref 40–120)
ALT FLD-CCNC: 12 U/L — SIGNIFICANT CHANGE UP (ref 10–45)
ANION GAP SERPL CALC-SCNC: 12 MMOL/L — SIGNIFICANT CHANGE UP (ref 5–17)
AST SERPL-CCNC: 22 U/L — SIGNIFICANT CHANGE UP (ref 10–40)
BASOPHILS # BLD AUTO: 0.04 K/UL — SIGNIFICANT CHANGE UP (ref 0–0.2)
BASOPHILS # BLD AUTO: 0.05 K/UL — SIGNIFICANT CHANGE UP (ref 0–0.2)
BASOPHILS NFR BLD AUTO: 0.5 % — SIGNIFICANT CHANGE UP (ref 0–2)
BASOPHILS NFR BLD AUTO: 0.5 % — SIGNIFICANT CHANGE UP (ref 0–2)
BILIRUB SERPL-MCNC: 0.8 MG/DL — SIGNIFICANT CHANGE UP (ref 0.2–1.2)
BUN SERPL-MCNC: 18 MG/DL — SIGNIFICANT CHANGE UP (ref 7–23)
CALCIUM SERPL-MCNC: 10 MG/DL — SIGNIFICANT CHANGE UP (ref 8.4–10.5)
CALCIUM SERPL-MCNC: 10.1 MG/DL — SIGNIFICANT CHANGE UP (ref 8.4–10.5)
CHLORIDE SERPL-SCNC: 108 MMOL/L — SIGNIFICANT CHANGE UP (ref 96–108)
CK SERPL-CCNC: 112 U/L — SIGNIFICANT CHANGE UP (ref 25–170)
CO2 SERPL-SCNC: 21 MMOL/L — LOW (ref 22–31)
CREAT SERPL-MCNC: 1.43 MG/DL — HIGH (ref 0.5–1.3)
EOSINOPHIL # BLD AUTO: 0.03 K/UL — SIGNIFICANT CHANGE UP (ref 0–0.5)
EOSINOPHIL # BLD AUTO: 0.24 K/UL — SIGNIFICANT CHANGE UP (ref 0–0.5)
EOSINOPHIL NFR BLD AUTO: 0.3 % — SIGNIFICANT CHANGE UP (ref 0–6)
EOSINOPHIL NFR BLD AUTO: 2.9 % — SIGNIFICANT CHANGE UP (ref 0–6)
GLUCOSE SERPL-MCNC: 92 MG/DL — SIGNIFICANT CHANGE UP (ref 70–99)
HCT VFR BLD CALC: 42.5 % — SIGNIFICANT CHANGE UP (ref 34.5–45)
HGB BLD-MCNC: 13.5 G/DL — SIGNIFICANT CHANGE UP (ref 11.5–15.5)
IMM GRANULOCYTES NFR BLD AUTO: 0.4 % — SIGNIFICANT CHANGE UP (ref 0–1.5)
IMM GRANULOCYTES NFR BLD AUTO: 0.5 % — SIGNIFICANT CHANGE UP (ref 0–1.5)
LYMPHOCYTES # BLD AUTO: 28.3 % — SIGNIFICANT CHANGE UP (ref 13–44)
LYMPHOCYTES # BLD AUTO: 3 K/UL — SIGNIFICANT CHANGE UP (ref 1–3.3)
LYMPHOCYTES # BLD AUTO: 3.26 K/UL — SIGNIFICANT CHANGE UP (ref 1–3.3)
LYMPHOCYTES # BLD AUTO: 38.7 % — SIGNIFICANT CHANGE UP (ref 13–44)
MAGNESIUM SERPL-MCNC: 2 MG/DL — SIGNIFICANT CHANGE UP (ref 1.6–2.6)
MCHC RBC-ENTMCNC: 30.5 PG — SIGNIFICANT CHANGE UP (ref 27–34)
MCHC RBC-ENTMCNC: 31.8 GM/DL — LOW (ref 32–36)
MCV RBC AUTO: 96.2 FL — SIGNIFICANT CHANGE UP (ref 80–100)
MONOCYTES # BLD AUTO: 0.45 K/UL — SIGNIFICANT CHANGE UP (ref 0–0.9)
MONOCYTES # BLD AUTO: 0.72 K/UL — SIGNIFICANT CHANGE UP (ref 0–0.9)
MONOCYTES NFR BLD AUTO: 5.3 % — SIGNIFICANT CHANGE UP (ref 2–14)
MONOCYTES NFR BLD AUTO: 6.8 % — SIGNIFICANT CHANGE UP (ref 2–14)
NEUTROPHILS # BLD AUTO: 4.4 K/UL — SIGNIFICANT CHANGE UP (ref 1.8–7.4)
NEUTROPHILS # BLD AUTO: 6.76 K/UL — SIGNIFICANT CHANGE UP (ref 1.8–7.4)
NEUTROPHILS NFR BLD AUTO: 52.2 % — SIGNIFICANT CHANGE UP (ref 43–77)
NEUTROPHILS NFR BLD AUTO: 63.6 % — SIGNIFICANT CHANGE UP (ref 43–77)
NRBC # BLD: 0 /100 WBCS — SIGNIFICANT CHANGE UP (ref 0–0)
PHOSPHATE SERPL-MCNC: 2.8 MG/DL — SIGNIFICANT CHANGE UP (ref 2.5–4.5)
PLATELET # BLD AUTO: 178 K/UL — SIGNIFICANT CHANGE UP (ref 150–400)
POTASSIUM SERPL-MCNC: 4.2 MMOL/L — SIGNIFICANT CHANGE UP (ref 3.5–5.3)
POTASSIUM SERPL-SCNC: 4.2 MMOL/L — SIGNIFICANT CHANGE UP (ref 3.5–5.3)
PROT SERPL-MCNC: 6.9 G/DL — SIGNIFICANT CHANGE UP (ref 6–8.3)
PTH-INTACT FLD-MCNC: 42 PG/ML — SIGNIFICANT CHANGE UP (ref 15–65)
RBC # BLD: 4.42 M/UL — SIGNIFICANT CHANGE UP (ref 3.8–5.2)
RBC # FLD: 13.2 % — SIGNIFICANT CHANGE UP (ref 10.3–14.5)
SODIUM SERPL-SCNC: 141 MMOL/L — SIGNIFICANT CHANGE UP (ref 135–145)
WBC # BLD: 8.42 K/UL — SIGNIFICANT CHANGE UP (ref 3.8–10.5)
WBC # FLD AUTO: 8.42 K/UL — SIGNIFICANT CHANGE UP (ref 3.8–10.5)

## 2020-06-28 RX ORDER — SODIUM CHLORIDE 9 MG/ML
1000 INJECTION INTRAMUSCULAR; INTRAVENOUS; SUBCUTANEOUS
Refills: 0 | Status: DISCONTINUED | OUTPATIENT
Start: 2020-06-28 | End: 2020-06-29

## 2020-06-28 RX ADMIN — SODIUM CHLORIDE 50 MILLILITER(S): 9 INJECTION INTRAMUSCULAR; INTRAVENOUS; SUBCUTANEOUS at 06:21

## 2020-06-28 RX ADMIN — ENOXAPARIN SODIUM 30 MILLIGRAM(S): 100 INJECTION SUBCUTANEOUS at 08:52

## 2020-06-28 RX ADMIN — SODIUM CHLORIDE 75 MILLILITER(S): 9 INJECTION INTRAMUSCULAR; INTRAVENOUS; SUBCUTANEOUS at 10:37

## 2020-06-28 NOTE — PROGRESS NOTE ADULT - PROBLEM SELECTOR PLAN 1
- S/p imaging without acute fracture  - PT consult  - SW consult  - May be due to dehydration, s/p IVF bolus as above. C/w gentle hydration  - May be due to hypotension, hold home amlodipine and f/u bp. F/u orthostatics - S/p imaging without acute fracture and no intracranial injury  - PT consult  - SW consult  - May be due to dehydration, s/p IVF bolus as above. C/w gentle hydration  - May be due to hypotension, hold home amlodipine and f/u bp. - S/p imaging without acute fracture and no intracranial injury  - PT consult  - SW consult  - May be due to dehydration, s/p IVF bolus as above. C/w gentle hydration  - May be due to hypotension, hold home amlodipine and f/u bp.  -F/u UA

## 2020-06-28 NOTE — PHYSICAL THERAPY INITIAL EVALUATION ADULT - PERTINENT HX OF CURRENT PROBLEM, REHAB EVAL
9 year old F with PMH MR, dementia, HTN, with noted recent admission for CAYLA and weakness, s/p dc to [facility] and return to home yesterday, who experienced fall last night. Patient states that she fell out of bed, did not strike her head or lose consciousness. She was able to walk throughout the day with her walker, however, when a visiting nurse came to house she was concerned about the patient's fall and possible need for additional social resources.

## 2020-06-28 NOTE — PROGRESS NOTE ADULT - PROBLEM SELECTOR PLAN 2
- Does not meet SIRS criteria, likely dehydration given CAYLA and hypercalcemia  - S/p 500cc NS bolus, will give 50cc/hr NS gentle hydration in setting of no volume overload  - F/u CBC in AM. If patient worsens or becomes febrile, can consider antibiotic coverage and obtain blood cultures  - F/u CXR  - F/u UA - Does not meet SIRS criteria, likely dehydration given CAYLA and hypercalcemia  - S/p 500cc NS bolus, will give 50cc/hr NS gentle hydration in setting of no volume overload  - F/u CBC in AM. If patient worsens or becomes febrile, can consider antibiotic coverage and obtain blood cultures  - CXR 6/27 wnl  - F/u UA

## 2020-06-28 NOTE — PROGRESS NOTE ADULT - SUBJECTIVE AND OBJECTIVE BOX
OVERNIGHT EVENTS:    SUBJECTIVE / INTERVAL HPI: Patient seen and examined at bedside.     VITAL SIGNS:  Vital Signs Last 24 Hrs  T(C): 36.9 (27 Jun 2020 22:41), Max: 36.9 (27 Jun 2020 22:41)  T(F): 98.5 (27 Jun 2020 22:41), Max: 98.5 (27 Jun 2020 22:41)  HR: 81 (27 Jun 2020 22:41) (73 - 81)  BP: 138/48 (27 Jun 2020 22:41) (98/50 - 138/48)  BP(mean): --  RR: 18 (27 Jun 2020 22:41) (18 - 18)  SpO2: 96% (27 Jun 2020 22:41) (95% - 96%)    PHYSICAL EXAM:    General: WDWN  HEENT: NC/AT; PERRL, anicteric sclera; MMM  Neck: supple  Cardiovascular: +S1/S2; RRR  Respiratory: CTA B/L; no W/R/R  Gastrointestinal: soft, NT/ND; +BSx4  Extremities: WWP; no edema, clubbing or cyanosis  Vascular: 2+ radial, DP/PT pulses B/L  Neurological: AAOx3; no focal deficits    MEDICATIONS:  MEDICATIONS  (STANDING):  enoxaparin Injectable 30 milliGRAM(s) SubCutaneous every 24 hours  sodium chloride 0.9%. 1000 milliLiter(s) (50 mL/Hr) IV Continuous <Continuous>    MEDICATIONS  (PRN):      ALLERGIES:  Allergies    No Known Allergies    Intolerances        LABS:                        13.2   10.94 )-----------( 200      ( 27 Jun 2020 18:03 )             40.3     06-27    136  |  103  |  22  ----------------------------<  114<H>  4.8   |  21<L>  |  1.76<H>    Ca    10.1      27 Jun 2020 18:02    TPro  7.2  /  Alb  3.5  /  TBili  0.9  /  DBili  x   /  AST  26  /  ALT  12  /  AlkPhos  50  06-27        CAPILLARY BLOOD GLUCOSE          RADIOLOGY & ADDITIONAL TESTS: Reviewed. OVERNIGHT EVENTS:  No acute events overnight.    SUBJECTIVE / INTERVAL HPI: Patient seen and examined at bedside. No new complaints. Has some arm and leg pain, that she states is chronic. No fevers, chills, nausea, vomiting, changes in bowel or urinary habits.    VITAL SIGNS:  Vital Signs Last 24 Hrs  T(C): 36.9 (27 Jun 2020 22:41), Max: 36.9 (27 Jun 2020 22:41)  T(F): 98.5 (27 Jun 2020 22:41), Max: 98.5 (27 Jun 2020 22:41)  HR: 81 (27 Jun 2020 22:41) (73 - 81)  BP: 138/48 (27 Jun 2020 22:41) (98/50 - 138/48)  BP(mean): --  RR: 18 (27 Jun 2020 22:41) (18 - 18)  SpO2: 96% (27 Jun 2020 22:41) (95% - 96%)    PHYSICAL EXAM:    General: WDWN  HEENT: NC/AT; PERRL, anicteric sclera; MMM  Neck: supple  Cardiovascular: +S1/S2; RRR  Respiratory: CTA B/L; no W/R/R  Gastrointestinal: soft, NT/ND; +BSx4  Extremities: WWP; no edema, clubbing or cyanosis  Vascular: 2+ radial, DP/PT pulses B/L  Neurological: AAOx3, answers questions but some confusion with conversation (seems chronic), no focal deficits    MEDICATIONS:  MEDICATIONS  (STANDING):  enoxaparin Injectable 30 milliGRAM(s) SubCutaneous every 24 hours  sodium chloride 0.9%. 1000 milliLiter(s) (50 mL/Hr) IV Continuous <Continuous>    MEDICATIONS  (PRN):    ALLERGIES:  Allergies    No Known Allergies    Intolerances    LABS:                        13.2   10.94 )-----------( 200      ( 27 Jun 2020 18:03 )             40.3     06-27    136  |  103  |  22  ----------------------------<  114<H>  4.8   |  21<L>  |  1.76<H>    Ca    10.1      27 Jun 2020 18:02    TPro  7.2  /  Alb  3.5  /  TBili  0.9  /  DBili  x   /  AST  26  /  ALT  12  /  AlkPhos  50  06-27        CAPILLARY BLOOD GLUCOSE    RADIOLOGY & ADDITIONAL TESTS: Reviewed.

## 2020-06-28 NOTE — PROGRESS NOTE ADULT - PROBLEM SELECTOR PLAN 4
- F/u PTH and vitD in AM  - May have component of dehydration as supported by above labs  - Does have thyroid nodule noted on imaging, can obtain outpatient thyroid ultrasound - F/u PTH and vitD  - May have component of dehydration as supported by above labs  - Does have thyroid nodule noted on imaging, can obtain outpatient thyroid ultrasound

## 2020-06-28 NOTE — PROGRESS NOTE ADULT - ATTENDING COMMENTS
Patient was seen and examined with the resident team today.  I agree with Dr. Valenzuela's assessment and plan with the following exceptions/additions:     Briefly, this is an 88yo ([place] and English-speaking) woman with a PMH of moderate MR, mild dementia and glaucoma who presented after rolling out of bed, subsequently found to have dehydration c/b CAYLA, which is resolving with IVFs.  Suspect poor PO intake at home 2/2 patient needing assistance with meals.  Of note, this is her 2nd and identical admission in 1 month.  Discharged to [facility] previously.     -- continue to monitor volume status and Cr with IVF's PRN (does have MR)  -- PT eval  -- SW on Monday, RE: most assistance at home as suspect her re-admissions are 2/2 poor supervision at home  -- DVT PPx - Lovenox  -- Dispo - TBD; if goes home, NEED to engage family to offer more assistance with meals and overall supervision     Alena Urena  [phone] Patient was seen and examined with the resident team today.  I agree with Dr. Valenzuela's assessment and plan with the following exceptions/additions:     Briefly, this is an 88yo ([place] and English-speaking) woman with a PMH of moderate MR, mild dementia and glaucoma who presented after rolling out of bed, subsequently found to have dehydration c/b CAYLA, which is resolving with IVFs.  Suspect poor PO intake at home 2/2 patient needing assistance with meals.  Of note, this is her 2nd and identical admission in 1 month.  Discharged to [facility] previously.     -- continue to monitor volume status and Cr with IVF's PRN (does have MR)  -- PT eval  -- SW on Monday, RE: most assistance at home as suspect her re-admissions are 2/2 poor supervision at home  -- needs outpt follow-up of thyroid nodule  -- DVT PPx - Lovenox  -- Dispo - TBD; if goes home, NEED to engage family to offer more assistance with meals and overall supervision     Alena Urena  [phone]

## 2020-06-28 NOTE — PROGRESS NOTE ADULT - PROBLEM SELECTOR PLAN 5
- F/u orthostatics  - Hold home amlodipine 5mg qd in setting of fall, hypotension, f/u bp -orthostatic BP 6/28 wnl  - Hold home amlodipine 5mg qd in setting of fall, hypotension, f/u bp

## 2020-06-28 NOTE — PROGRESS NOTE ADULT - PROBLEM SELECTOR PLAN 6
- 0.02, consistent with previous levels  - No ACS symptoms, EKG with no acute changes  - No need to trend trops at this time as clinical symptoms, labs, EKG not suggestive of ACS - 0.02, consistent with previous levels  - No ACS symptoms, EKG with no acute changes, likely 2/2 demand ischemia vs. poor clearance  - No need to trend trops at this time as clinical symptoms, labs, EKG not suggestive of ACS

## 2020-06-29 PROCEDURE — 99233 SBSQ HOSP IP/OBS HIGH 50: CPT | Mod: GC

## 2020-06-29 RX ADMIN — ENOXAPARIN SODIUM 30 MILLIGRAM(S): 100 INJECTION SUBCUTANEOUS at 08:14

## 2020-06-29 NOTE — PROGRESS NOTE ADULT - PROBLEM SELECTOR PLAN 6
- 0.02, consistent with previous levels  - No ACS symptoms, EKG with no acute changes, likely 2/2 demand ischemia vs. poor clearance  - No need to trend trops at this time as clinical symptoms, labs, EKG not suggestive of ACS - 0.02, consistent with previous levels  - No ACS symptoms, EKG with no acute changes, likely 2/2 demand ischemia vs. poor clearance  -No need to continue to trend  - No need to trend trops at this time as clinical symptoms, labs, EKG not suggestive of ACS

## 2020-06-29 NOTE — CONSULT NOTE ADULT - SUBJECTIVE AND OBJECTIVE BOX
Patient is a 89y old  Female who presents with a chief complaint of CAYLA, mechanical fall (29 Jun 2020 07:07)       HPI:  Patient is an 89 year old F with PMH MR, dementia, HTN, with noted recent admission for CAYLA and weakness, s/p dc to [facility] and return to home yesterday, who experienced fall last night. Patient states that she fell out of bed, did not strike her head or lose consciousness. She was able to walk throughout the day with her walker, however, when a visiting nurse came to house she was concerned about the patient's fall and possible need for additional social resources. 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 At [facility] ED T36.5, HR 77, 98/50, RR18 sat 95% RA. Labs with WBC 10.9, creatinine 1.7 (baseline ~1.2). Trop 0.02 (baseline). CT head and C-spine negative for fracture, moderate spondylosis of C spine. EKG sinus with PAC, nonspecific T changes. Covid negative. Got 500cc bolus in ED. (27 Jun 2020 21:52)      PAST MEDICAL & SURGICAL HISTORY:  Mitral regurgitation  Glaucoma  Shingles  H/O eye surgery      MEDICATIONS  (STANDING):  enoxaparin Injectable 30 milliGRAM(s) SubCutaneous every 24 hours    MEDICATIONS  (PRN):      FAMILY HISTORY:  FH: arthritis: sister, grandmother      CBC Full  -  ( 28 Jun 2020 10:31 )  WBC Count : 8.42 K/uL  RBC Count : 4.42 M/uL  Hemoglobin : 13.5 g/dL  Hematocrit : 42.5 %  Platelet Count - Automated : 178 K/uL  Mean Cell Volume : 96.2 fl  Mean Cell Hemoglobin : 30.5 pg  Mean Cell Hemoglobin Concentration : 31.8 gm/dL  Auto Neutrophil # : 4.40 K/uL  Auto Lymphocyte # : 3.26 K/uL  Auto Monocyte # : 0.45 K/uL  Auto Eosinophil # : 0.24 K/uL  Auto Basophil # : 0.04 K/uL  Auto Neutrophil % : 52.2 %  Auto Lymphocyte % : 38.7 %  Auto Monocyte % : 5.3 %  Auto Eosinophil % : 2.9 %  Auto Basophil % : 0.5 %      06-28    141  |  108  |  18  ----------------------------<  92  4.2   |  21<L>  |  1.43<H>    Ca    10.0      28 Jun 2020 10:31  Phos  2.8     06-28  Mg     2.0     06-28    TPro  6.9  /  Alb  3.5  /  TBili  0.8  /  DBili  x   /  AST  22  /  ALT  12  /  AlkPhos  51  06-28            Radiology:    < from: Xray Chest 1 View- PORTABLE-Urgent (06.27.20 @ 22:09) >  EXAM:  XR CHEST PORTABLE URGENT 1V                          PROCEDURE DATE:  06/27/2020          INTERPRETATION:  Clinical History: Pneumonia    Portable examination the chest demonstrates the heart to be within normal limits in transverse diameter. No acute infiltrates. Dextroscoliosis thoracic spine with degenerative changes. Calcification aortic knob. Degenerative changes shoulder regions. Mild chronic interstitial changes    Impression: No acute infiltrates        < from: CT Head No Cont (06.27.20 @ 20:06) >  EXAM:  CT BRAIN                          PROCEDURE DATE:  06/27/2020          INTERPRETATION:  INDICATIONS: Fall.     TECHNIQUE: Serial axial images were obtained from the skull base to the vertex without the use of intravenous contrast. Sagittal and coronal images were reformatted.     COMPARISON EXAMINATION: CT head from 5/29/2020    FINDINGS:    VENTRICLES AND SULCI: Mild sulcal and ventricular prominence due to parenchymal volume loss.  INTRA-AXIAL: No intracranial mass, acute hemorrhage, midline shift or acute transcortical infarct is seen. There are areas of hypodensity and periventricular and subcortical matter consistent with microangiopathic disease. Chronic lacunar infarcts involving the right cerebellar vermis and left lateral cerebellum.  EXTRA-AXIAL: No extra-axial fluid collection is present.   VISUALIZED SINUSES: No air-fluid levels are identified.   VISUALIZED MASTOIDS: Clear.  CALVARIUM: Normal.    IMPRESSION:   No acute intracranial hemorrhage or calvarial fracture.     No significant interval change from prior CT dated 5/29/2020.        < from: CT Cervical Spine No Cont (06.27.20 @ 20:08) >  EXAM:  CT CERVICAL SPINE                          PROCEDURE DATE:  06/27/2020          INTERPRETATION:  CERVICAL SPINE CT WITHOUT CONTRAST dated 6/27/2020 8:08 PM     CLINICAL STATEMENT: fall, blunt head injury.     TECHNIQUE: Contiguous noncontrast transaxial CT images were obtained through the cervical spine. Reconstructions were then created in the axial, sagittal, and coronal planes.    COMPARISON: None.    FINDINGS:  There is mild reversal and straightening of the normal cervical lordosis.The osseous structures are intact without fracture. There is mild multilevel vertebral body height loss, disc space narrowing, and anterior and posterior endplate osteophytosis. The prevertebral soft tissues are within normal limits.    Evaluation ofcervical spine shows multiple small central disc herniations without spinal canal stenosis. There is multilevel neuroforaminal narrowing worse at the right C3-C4 level and the left C5-C6 level where there is moderate neural foraminal narrowing.     There is a minimal biapical pleural parenchymal scarring. 1.4 cm right thyroid nodule with faint peripheral calcification.    IMPRESSION:  No acute fractures.    Moderate spondylosis of the cervical spine.    1.4 cm right thyroid nodule. Recommend correlation with any outside prior imaging. If no prior available imaging, recommend outpatient thyroid ultrasound.        < from: CT Hip No Cont, Left (06.27.20 @ 20:07) >  EXAM:  CT HIP ONLY LT                          PROCEDURE DATE:  06/27/2020          INTERPRETATION:  Attending over read. Agree with the below report with following modifications. No hip joint effusion. No visualized fracture. Calcified fibroid uterus. A 2.2 x 1.9 x 2.6 cm hypodensity seen in the central uterus should be correlated with pelvic ultrasound. Incidental probable left lateral disc herniation at L3-L4        PROCEDURE INFORMATION:  Exam: CT Pelvis Without Contrast; Skeletal  Exam date and time: 6/27/2020 7:56 PM  Age: 89 years old  Clinical indication: Patient HX: Fall, evaluate for fracture.    TECHNIQUE:  Imaging protocol: Computed tomography images of the pelvis without contrast. Exam focused on  the skeletal structures.    COMPARISON:  No relevant prior studies available.    FINDINGS:  Reproductive: Calcified fibroid changes of the uterus.  Bones/joints: Unremarkable. No acute fracture. No dislocation.  Soft tissues: Unremarkable.    IMPRESSION:  No fracture or subluxation of the pelvis or hips, particularly on the left.            Vital Signs Last 24 Hrs  T(C): 36.7 (29 Jun 2020 09:01), Max: 36.7 (29 Jun 2020 09:01)  T(F): 98 (29 Jun 2020 09:01), Max: 98 (29 Jun 2020 09:01)  HR: 83 (29 Jun 2020 09:01) (74 - 85)  BP: 119/53 (29 Jun 2020 09:01) (113/71 - 143/65)  BP(mean): --  RR: 18 (29 Jun 2020 09:01) (16 - 18)  SpO2: 100% (29 Jun 2020 09:01) (96% - 100%)    REVIEW OF SYSTEMS: per HPI/ fall        Physical Exam:  frail 88 yo woman lying in semi Rae's position, no acute complaints    Head: normocephalic, atraumatic    Eyes: PERRLA, EOMI, no nystagmus, sclera anicteric    ENT: nasal discharge, uvula midline, no oropharyngeal erythema/exudate    Neck: supple, negative JVD, negative carotid bruits, no thyromegaly    Chest: CTA bilaterally, neg wheeze/ rhonchi/ rales/ crackles/ egophany    Cardiovascular: regular rate and rhythm, neg murmurs/rubs/gallops    Abdomen: soft, non distended, non tender to palpation in all 4 quadrants, negative rebound/guarding, normal bowel sounds    Extremities: WWP, neg cyanosis/clubbing/edema, negative calf tenderness to palpation, negative Orlin's sign    Musculoskeletal:      :     Neurologic Exam:    Alert and oriented to person, place, date/year, speech fluent w/o dysarthria, recent and remote memory intact, repetition intact, comprehension intact    Cranial Nerves:     II:                       pupils equal, round and reactive to light, visual fields intact   III/ IV/VI:            extraocular movements intact, neg nystagmus, neg ptosis  V:                       facial sensation intact, V1-3 normal  VII:                     face symmetric, no droop, normal eye closure and smile  VIII:                    hearing intact to finger rub bilaterally  IX/ X:                 soft palate rise symmetrical  XI:                      head turning, shoulder shrug normal  XII:                     tongue midline    Motor Exam:    Upper Extremities:     RIght:  no focal weakness    Left :    no focal weakness    Lower Extremities:                 Right:   no focal weakness                 Left:     no focal weakness                 Sensory:    intact to LT/PP in all UE/LE dermatomes                     DTR:             = biceps/     triceps/     brachioradialis                      = patella/   medial hamstring/ankle                      neg clonus                      neg Babinski                        Gait:  not tested        PM&R Impression:    1) s/p fall  2) deconditioned  3) no focal weakness    Plan:    1) Physical therapy focusing on therapeutic exercises, bed mobility/transfer out of bed evaluation, progressive ambulation with assistive devices prn.    2) Anticipated Disposition Plan/Recs:  pending functional progress

## 2020-06-29 NOTE — PROGRESS NOTE ADULT - PROBLEM SELECTOR PLAN 4
- PTH and Vit D wnl; likely 2/2 dehydration  - Does have thyroid nodule noted on imaging, can obtain outpatient thyroid ultrasound

## 2020-06-29 NOTE — PHYSICAL THERAPY INITIAL EVALUATION ADULT - PERTINENT HX OF CURRENT PROBLEM, REHAB EVAL
Pt. is an 89 y.o. female presenting s/p fall at home. Pt. was additionally found to have CAYLA secondary dehydration.

## 2020-06-29 NOTE — CONSULT NOTE ADULT - ASSESSMENT
per Internal Medicine    89 yo woman with PMH MR, mild dementia, HTN, and glaucoma who presented after rolling out of bed, subsequently found to have and CAYLA, now resolving with IVFs. Admitted in setting of mechanical fall with anticipated increased support needs at home.    Problem/Plan - 1:  ·  Problem: Fall, initial encounter.  Plan: - S/p imaging without acute fracture and no intracranial injury  - PT consult  - SW consult  - May be due to dehydration, s/p IVF (d/c'ed 6/29)  - May be due to hypotension, hold home amlodipine and f/u bp.  -F/u UA.     Problem/Plan - 2:  ·  Problem: Leukocytosis.  Plan: -Likely due to hemoconcentration/dehydration, now resolved s/p IVF  -Remains afebrile, CXR 6/27 wnl, concern for underlying infection low; still need to f/u UA  - S/p 500cc NS bolus, and 75cc/hr NS 6/28.     Problem/Plan - 3:  ·  Problem: CAYLA (acute kidney injury).  Plan: - Likely due to dehydration consistent with previous creatinine range during last hospitalization, Cr improved s/p IVF.     Problem/Plan - 4:  ·  Problem: Hypercalcemia.  Plan: - PTH and Vit D wnl; likely 2/2 dehydration  - Does have thyroid nodule noted on imaging, can obtain outpatient thyroid ultrasound.     Problem/Plan - 5:  ·  Problem: Hypertension.  Plan: -orthostatic BP 6/28 wnl  - Hold home amlodipine 5mg qd in setting of fall, hypotension, f/u bp.     Problem/Plan - 6:  Problem: Troponin level elevated. Plan: - 0.02, consistent with previous levels  - No ACS symptoms, EKG with no acute changes, likely 2/2 demand ischemia vs. poor clearance  -No need to continue to trend  - No need to trend trops at this time as clinical symptoms, labs, EKG not suggestive of ACS.    Problem/Plan - 7:  ·  Problem: Prophylactic measure.  Plan: Lovenox ppx  DASH diet

## 2020-06-29 NOTE — PROGRESS NOTE ADULT - SUBJECTIVE AND OBJECTIVE BOX
OVERNIGHT EVENTS: No acute events overnight    SUBJECTIVE / INTERVAL HPI: Patient seen and examined at bedside. Patient seen and examined at bedside. No new complaints. Complaining of L leg pain; Hip CT wnl and she states it is chronic. No fevers, chills, nausea, vomiting, changes in bowel or urinary habits.      VITAL SIGNS:  Vital Signs Last 24 Hrs  T(C): 36.4 (28 Jun 2020 21:55), Max: 36.8 (28 Jun 2020 08:14)  T(F): 97.6 (28 Jun 2020 21:55), Max: 98.2 (28 Jun 2020 08:14)  HR: 85 (28 Jun 2020 21:55) (74 - 85)  BP: 143/65 (28 Jun 2020 21:55) (113/71 - 143/65)  BP(mean): --  RR: 16 (28 Jun 2020 21:55) (16 - 16)  SpO2: 97% (28 Jun 2020 21:55) (96% - 97%)    PHYSICAL EXAM:    General: Elderly woman lying comfortably in bed in NAD  HEENT: NC/AT; PERRL, anicteric sclera; MMM  Neck: supple  Cardiovascular: +S1/S2; RRR  Respiratory: CTA B/L; no W/R/R  Gastrointestinal: soft, NT/ND; +BSx4  Extremities: WWP; no edema, clubbing or cyanosis  Vascular: 2+ radial, DP/PT pulses B/L  Neurological: AAOx3, no focal deficits    MEDICATIONS:  MEDICATIONS  (STANDING):  enoxaparin Injectable 30 milliGRAM(s) SubCutaneous every 24 hours    MEDICATIONS  (PRN):    ALLERGIES:  Allergies    No Known Allergies    Intolerances        LABS:                        13.5   8.42  )-----------( 178      ( 28 Jun 2020 10:31 )             42.5     06-28    141  |  108  |  18  ----------------------------<  92  4.2   |  21<L>  |  1.43<H>    Ca    10.0      28 Jun 2020 10:31  Phos  2.8     06-28  Mg     2.0     06-28    TPro  6.9  /  Alb  3.5  /  TBili  0.8  /  DBili  x   /  AST  22  /  ALT  12  /  AlkPhos  51  06-28    CAPILLARY BLOOD GLUCOSE    RADIOLOGY & ADDITIONAL TESTS: Reviewed. OVERNIGHT EVENTS: No acute events overnight    SUBJECTIVE / INTERVAL HPI: Patient seen and examined at bedside. No new complaints. Complaining of L leg pain, but states it is chronic. No fevers, chills, nausea, vomiting, changes in bowel or urinary habits.      VITAL SIGNS:  Vital Signs Last 24 Hrs  T(C): 36.4 (28 Jun 2020 21:55), Max: 36.8 (28 Jun 2020 08:14)  T(F): 97.6 (28 Jun 2020 21:55), Max: 98.2 (28 Jun 2020 08:14)  HR: 85 (28 Jun 2020 21:55) (74 - 85)  BP: 143/65 (28 Jun 2020 21:55) (113/71 - 143/65)  BP(mean): --  RR: 16 (28 Jun 2020 21:55) (16 - 16)  SpO2: 97% (28 Jun 2020 21:55) (96% - 97%)    PHYSICAL EXAM:    General: Elderly woman lying comfortably in bed in NAD  HEENT: NC/AT; PERRL, anicteric sclera; MMM  Neck: supple  Cardiovascular: +S1/S2; RRR  Respiratory: CTA B/L; no W/R/R  Gastrointestinal: soft, NT/ND; +BSx4  Extremities: WWP; no edema, clubbing or cyanosis  Vascular: 2+ radial, DP/PT pulses B/L  Neurological: AAOx3, no focal deficits, moving all extremities, trouble following commands for complete neurological exam, however, strength, bulk and tone appear to be wnl, no appreciable focal weakness    MEDICATIONS:  MEDICATIONS  (STANDING):  enoxaparin Injectable 30 milliGRAM(s) SubCutaneous every 24 hours    MEDICATIONS  (PRN):    ALLERGIES:  Allergies    No Known Allergies    Intolerances      LABS:                        13.5   8.42  )-----------( 178      ( 28 Jun 2020 10:31 )             42.5     06-28    141  |  108  |  18  ----------------------------<  92  4.2   |  21<L>  |  1.43<H>    Ca    10.0      28 Jun 2020 10:31  Phos  2.8     06-28  Mg     2.0     06-28    TPro  6.9  /  Alb  3.5  /  TBili  0.8  /  DBili  x   /  AST  22  /  ALT  12  /  AlkPhos  51  06-28    CAPILLARY BLOOD GLUCOSE    RADIOLOGY & ADDITIONAL TESTS: Reviewed.

## 2020-06-29 NOTE — PROGRESS NOTE ADULT - PROBLEM SELECTOR PLAN 1
- S/p imaging without acute fracture and no intracranial injury  - PT consult  - SW consult  - May be due to dehydration, s/p IVF bolus as above. C/w gentle hydration  - May be due to hypotension, hold home amlodipine and f/u bp.  -F/u UA - S/p imaging without acute fracture and no intracranial injury  - PT consult  - SW consult  - May be due to dehydration, s/p IVF (d/c'ed 6/29)  - May be due to hypotension, hold home amlodipine and f/u bp.  -F/u UA

## 2020-06-29 NOTE — PROGRESS NOTE ADULT - PROBLEM SELECTOR PLAN 2
-Likely due to hemoconcentration/dehydration, now resolved s/p IVF  - S/p 500cc NS bolus, and 75cc/hr NS 6/28  - CXR 6/27 wnl  - F/u UA -Likely due to hemoconcentration/dehydration, now resolved s/p IVF  -Remains afebrile, CXR 6/27 wnl, concern for underlying infection low; still need to f/u UA  - S/p 500cc NS bolus, and 75cc/hr NS 6/28

## 2020-06-29 NOTE — PROGRESS NOTE ADULT - ATTENDING COMMENTS
pt seen and examined by me case d/w housestaff agree with VS, PE, assessment and plan as outlined above

## 2020-06-30 ENCOUNTER — TRANSCRIPTION ENCOUNTER (OUTPATIENT)
Age: 85
End: 2020-06-30

## 2020-06-30 PROCEDURE — 99232 SBSQ HOSP IP/OBS MODERATE 35: CPT | Mod: GC

## 2020-06-30 RX ORDER — METOPROLOL TARTRATE 50 MG
25 TABLET ORAL
Refills: 0 | Status: DISCONTINUED | OUTPATIENT
Start: 2020-06-30 | End: 2020-07-02

## 2020-06-30 RX ORDER — METOPROLOL TARTRATE 50 MG
25 TABLET ORAL DAILY
Refills: 0 | Status: DISCONTINUED | OUTPATIENT
Start: 2020-06-30 | End: 2020-06-30

## 2020-06-30 RX ADMIN — ENOXAPARIN SODIUM 30 MILLIGRAM(S): 100 INJECTION SUBCUTANEOUS at 07:52

## 2020-06-30 RX ADMIN — Medication 25 MILLIGRAM(S): at 17:06

## 2020-06-30 NOTE — PROGRESS NOTE ADULT - PROBLEM SELECTOR PLAN 6
- 0.02 on admission, consistent with previous levels  - No ACS symptoms, EKG with no acute changes, likely 2/2 demand ischemia vs. poor clearance; have not been trending  -No need to continue to trend  - No need to trend trops at this time as clinical symptoms, labs, EKG not suggestive of ACS

## 2020-06-30 NOTE — DISCHARGE NOTE PROVIDER - NSDCMRMEDTOKEN_GEN_ALL_CORE_FT
acetaminophen 325 mg oral tablet: 2 tab(s) orally every 6 hours, As needed, Moderate Pain (4 - 6)  amLODIPine 5 mg oral tablet: 1 tab(s) orally once a day  nystatin 100,000 units/mL oral suspension: 5 milliliter(s) orally 4 times a day metoprolol tartrate 25 mg oral tablet: 1 tab(s) orally 2 times a day

## 2020-06-30 NOTE — PROGRESS NOTE ADULT - PROBLEM SELECTOR PLAN 2
-Likely due to hemoconcentration/dehydration, now resolved s/p IVF  -Remains afebrile, CXR 6/27 wnl, concern for underlying infection low

## 2020-06-30 NOTE — PROGRESS NOTE ADULT - PROBLEM SELECTOR PLAN 7
Lovenox ppx  DASH diet  Dispo [facility] -Pt tachycardic this AM, takes home metoprolol tartrate therefore re-prescribed 25mg lopressor BID

## 2020-06-30 NOTE — PROGRESS NOTE ADULT - SUBJECTIVE AND OBJECTIVE BOX
Physical Medicine and Rehabilitation Progress Note:    Patient is a 89y old  Female who presents with a chief complaint of CAYLA, mechanical fall (30 Jun 2020 07:44)      HPI:  Patient is an 89 year old F with PMH MR, dementia, HTN, with noted recent admission for CAYLA and weakness, s/p dc to [facility] and return to home yesterday, who experienced fall last night. Patient states that she fell out of bed, did not strike her head or lose consciousness. She was able to walk throughout the day with her walker, however, when a visiting nurse came to house she was concerned about the patient's fall and possible need for additional social resources. 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 At [facility] ED T36.5, HR 77, 98/50, RR18 sat 95% RA. Labs with WBC 10.9, creatinine 1.7 (baseline ~1.2). Trop 0.02 (baseline). CT head and C-spine negative for fracture, moderate spondylosis of C spine. EKG sinus with PAC, nonspecific T changes. Covid negative. Got 500cc bolus in ED. (27 Jun 2020 21:52)                Vital Signs Last 24 Hrs  T(C): 36.4 (30 Jun 2020 08:57), Max: 36.8 (30 Jun 2020 05:19)  T(F): 97.6 (30 Jun 2020 08:57), Max: 98.2 (30 Jun 2020 05:19)  HR: 98 (30 Jun 2020 08:57) (83 - 100)  BP: 133/65 (30 Jun 2020 08:57) (105/70 - 135/76)  BP(mean): --  RR: 18 (30 Jun 2020 08:57) (18 - 18)  SpO2: 98% (30 Jun 2020 08:57) (97% - 100%)    MEDICATIONS  (STANDING):  enoxaparin Injectable 30 milliGRAM(s) SubCutaneous every 24 hours  metoprolol tartrate 25 milliGRAM(s) Oral two times a day    MEDICATIONS  (PRN):    Currently Undergoing Physical/ Occupational Therapy at bedside.    Functional Status Assessment:         Cognitive/Perceptual/Neuro  Cognitive/Neuro/Behavioral [WDL Definition: Alert; opens eyes spontaneously; arouses to voice or touch; oriented x 4; follows commands; speech spontaneous, logical; purposeful motor response; behavior appropriate to situation]: WDL except  Level of Consciousness: confused;  alert  Orientation: time;  situation    Therapeutic Interventions      Bed Mobility  Bed Mobility Training Scooting: moderate assist (50% patient effort);  1 person assist;  verbal cues  Bed Mobility Training Sit-to-Supine: moderate assist (50% patient effort);  1 person assist;  verbal cues;  nonverbal cues (demo/gestures)  Bed Mobility Training Supine-to-Sit: moderate assist (50% patient effort);  1 person assist;  verbal cues;  nonverbal cues (demo/gestures)  Bed Mobility Training Limitations: decreased ability to use arms for pushing/pulling;  decreased ability to use legs for bridging/pushing;  cognitive, decreased safety awareness;  impaired vision    Sit-Stand Transfer Training  Transfer Training Sit-to-Stand Transfer: minimum assist (75% patient effort);  1 person assist;  verbal cues;  weight-bearing as tolerated   rolling walker;  nonverbal cues (demo/gestures)  Transfer Training Stand-to-Sit Transfer: minimum assist (75% patient effort);  1 person assist;  verbal cues;  weight-bearing as tolerated   rolling walker  Sit-to-Stand Transfer Training Transfer Safety Analysis: impaired vision;  impaired balance;  cognitive, decreased safety awareness;  rolling walker    Gait Training  Gait Training: contact guard;  minimum assist (75% patient effort);  verbal cues;  nonverbal cues (demo/gestures);  1 person assist;  Min A to navigate RW;  weight-bearing as tolerated   rolling walker;  50 feet  Gait Analysis: 3-point gait   decreased lewis;  decreased hip/knee flexion;  decreased step length;  decreased stride length;  impaired balance;  impaired vision;  50 feet;  rolling walker  Gait Number of Times:: x 2  Type of Rest Type of Rest: standing    Therapeutic Exercise  Therapeutic Exercise Detail: Sitting on EOB marching in place, bilateral knee ext x 10 reps each exercise             PM&R Impression: as above    Current Disposition Plan Recommendations:    subacute rehab placement

## 2020-06-30 NOTE — PROGRESS NOTE ADULT - SUBJECTIVE AND OBJECTIVE BOX
OVERNIGHT EVENTS: No acute events overnight    SUBJECTIVE / INTERVAL HPI: Patient seen and examined at bedside. No fevers/chills, nausea, vomiting, diarrhea, abdominal pain, chest pain, shortness of breath. Still complaining of left leg pain, but in good spirits, and pain is unchanged.    VITAL SIGNS:  Vital Signs Last 24 Hrs  T(C): 36.8 (30 Jun 2020 05:19), Max: 36.8 (30 Jun 2020 05:19)  T(F): 98.2 (30 Jun 2020 05:19), Max: 98.2 (30 Jun 2020 05:19)  HR: 100 (30 Jun 2020 05:19) (83 - 100)  BP: 135/76 (30 Jun 2020 05:19) (105/70 - 135/76)  BP(mean): --  RR: 18 (30 Jun 2020 05:19) (18 - 18)  SpO2: 97% (30 Jun 2020 05:19) (97% - 100%)    PHYSICAL EXAM:    General: Elderly woman lying comfortably in bed, with knees curled up, moving around in NAD  HEENT: NC/AT; anicteric sclera; MMM  Neck: supple  Cardiovascular: +S1/S2; RRR  Respiratory: CTA B/L; no W/R/R  Gastrointestinal: soft, NT/ND; +BSx4  Extremities: WWP; no edema, clubbing or cyanosis  Vascular: 2+ radial, DP pulses B/L  Neurological: AAOx3, no focal deficits, moving all extremities, strength, bulk and tone wnl      MEDICATIONS:  MEDICATIONS  (STANDING):  enoxaparin Injectable 30 milliGRAM(s) SubCutaneous every 24 hours    MEDICATIONS  (PRN):    ALLERGIES:  Allergies    No Known Allergies    Intolerances        LABS:                        13.5   8.42  )-----------( 178      ( 28 Jun 2020 10:31 )             42.5     06-28    141  |  108  |  18  ----------------------------<  92  4.2   |  21<L>  |  1.43<H>    Ca    10.0      28 Jun 2020 10:31  Phos  2.8     06-28  Mg     2.0     06-28    TPro  6.9  /  Alb  3.5  /  TBili  0.8  /  DBili  x   /  AST  22  /  ALT  12  /  AlkPhos  51  06-28        CAPILLARY BLOOD GLUCOSE      RADIOLOGY & ADDITIONAL TESTS: Reviewed. OVERNIGHT EVENTS: No acute events overnight    SUBJECTIVE / INTERVAL HPI: Patient seen and examined at bedside. No fevers/chills, nausea, vomiting, diarrhea, abdominal pain, chest pain, shortness of breath. Still complaining of left leg pain, but in good spirits, and pain is unchanged.    VITAL SIGNS:  Vital Signs Last 24 Hrs  T(C): 36.8 (30 Jun 2020 05:19), Max: 36.8 (30 Jun 2020 05:19)  T(F): 98.2 (30 Jun 2020 05:19), Max: 98.2 (30 Jun 2020 05:19)  HR: 100 (30 Jun 2020 05:19) (83 - 100)  BP: 135/76 (30 Jun 2020 05:19) (105/70 - 135/76)  BP(mean): --  RR: 18 (30 Jun 2020 05:19) (18 - 18)  SpO2: 97% (30 Jun 2020 05:19) (97% - 100%)    PHYSICAL EXAM:    General: Elderly woman lying comfortably in bed, with knees curled up, moving around in NAD  HEENT: NC/AT; anicteric sclera; MMM  Neck: supple  Cardiovascular: +S1/S2; RRR, tachycardic  Respiratory: CTA B/L; no W/R/R  Gastrointestinal: soft, NT/ND; +BSx4  Extremities: WWP; no edema, clubbing or cyanosis  Vascular: 2+ radial, DP pulses B/L  Neurological: AAOx3, no focal deficits, moving all extremities, strength, bulk and tone wnl      MEDICATIONS:  MEDICATIONS  (STANDING):  enoxaparin Injectable 30 milliGRAM(s) SubCutaneous every 24 hours    MEDICATIONS  (PRN):    ALLERGIES:  Allergies    No Known Allergies    Intolerances    LABS:                        13.5   8.42  )-----------( 178      ( 28 Jun 2020 10:31 )             42.5     06-28    141  |  108  |  18  ----------------------------<  92  4.2   |  21<L>  |  1.43<H>    Ca    10.0      28 Jun 2020 10:31  Phos  2.8     06-28  Mg     2.0     06-28    TPro  6.9  /  Alb  3.5  /  TBili  0.8  /  DBili  x   /  AST  22  /  ALT  12  /  AlkPhos  51  06-28        CAPILLARY BLOOD GLUCOSE      RADIOLOGY & ADDITIONAL TESTS: Reviewed.

## 2020-06-30 NOTE — DISCHARGE NOTE PROVIDER - NSDCCPCAREPLAN_GEN_ALL_CORE_FT
PRINCIPAL DISCHARGE DIAGNOSIS  Diagnosis: Fall, initial encounter  Assessment and Plan of Treatment: You have had a fall today. It appears that the cause is “mechanical”. That means that you slipped, tripped or lost your balance. If your fall had been due to fainting or a seizure, further tests would be required. Health conditions which change your blood pressure, vision, muscle strength and coordination may increase your risk for falls. Medication can also increase your risk if they make you dizzy, weak, or sleepy. To prevent falls, you can stand or sit up slowly, use assistive devices as directed, pwear shoes that fit well and have soles that , wear a personal alarm, stay active, and manage your medical conditions. Home safety tips include keeping your path clear, removing small rugs, not walking on wet surfaces, installing bright lights at home, and keeping items you use often on shelves within reach.      SECONDARY DISCHARGE DIAGNOSES  Diagnosis: Hypertension  Assessment and Plan of Treatment: Hypertension is the medical term for high blood pressure. Blood pressure refers to the pressure that blood applies to the inner walls of the arteries. Arteries carry blood from the heart to other organs and parts of the body. Untreated high blood pressure increases the strain on the heart and arteries, eventually causing organ damage. High blood pressure increases the risk of heart failure, heart attack (myocardial infarction), stroke, and kidney failure. High blood pressure does not usually cause any symptoms. Treatment of hypertension usually begins with lifestyle changes. Making these lifestyle changes involves little or no risk. Recommended changes often include reducing the amount of salt in your diet, losing weight if you are overweight or obese, avoiding drinking too much alcohol, stopping smoking and exercising at least 30 minutes per day most days of the week.   We did not give you your home amlodipine prescription, as we were concerned about your blood pressure potentially contributing to your fall. Please reassess blood pressure and consider whether amlodipine should be restarted.    Diagnosis: CAYLA (acute kidney injury)  Assessment and Plan of Treatment: During this hospital admission you were found to have acute kidney injury. Acute kidney injury (CAYLA) is a sudden episode of kidney failure or kidney damage that happens within a few hours or a few days. CAYLA causes a build-up of waste products in your blood and makes it hard for your kidneys to keep the right balance of fluid in your body. Your kidney numbers have been improving. Please follow up with your primary care doctor for further evaluation and testing.

## 2020-06-30 NOTE — DISCHARGE NOTE PROVIDER - HOSPITAL COURSE
89 yo woman with PMH MR, mild dementia, HTN, and glaucoma who presented after rolling out of bed, subsequently found to have and CAYLA, now resolving with IVFs. Admitted in setting of mechanical fall with anticipated increased support needs at home.        Problem List/Main Diagnoses (system-based):    1. Fall: likely due to poor PO intake/ dehydration vs. hypotension    - S/p imaging without acute fracture and no intracranial injury    -PT recommended dispo to [facility] for increased support        2. CAYLA: Likely due to dehydration consistent with previous creatinine range during last hospitalization, Cr improved s/p IVF.        3. Leukocytosis: Also , most likely due to hemoconcentration/dehydration, now resolved s/p IVF    -Remains afebrile, CXR 6/27 wnl, concern for underlying infection low        4. Hypercalcemia: likely 2/2 dehydration    - PTH and Vit D wnl    - Does have thyroid nodule noted on imaging, can obtain outpatient thyroid ultrasound        5.Hypertension    - Hold home amlodipine 5mg qd in setting of fall, hypotension        Inpatient treatment course:     Imaging on admission (CT head/neck and hip) all negative/wnl. Pt with slightly elevated Cr (1.76) (consistent with prior Cr), now improved following 2 days IVF. PT evaluation to assess for appropriate level of care upon discharge.         New medications: NA        Labs to be followed outpatient:     Obtain thyroid ultrasound    F/u Cr        Exam to be followed outpatient: NA 88 yo woman with PMH MR, mild dementia, HTN, and glaucoma who presented after rolling out of bed, subsequently found to have and CAYLA, now resolving with IVFs. Admitted in setting of mechanical fall with anticipated increased support needs at home.        Problem List/Main Diagnoses (system-based):    1. Fall: likely due to poor PO intake/ dehydration vs. hypotension    - S/p imaging without acute fracture and no intracranial injury    -PT recommended dispo to increased/24hr support        2. CAYLA: Likely due to dehydration consistent with previous creatinine range during last hospitalization, Cr improved s/p IVF.        3. Leukocytosis: Also , most likely due to hemoconcentration/dehydration, now resolved s/p IVF    -Remains afebrile, CXR 6/27 wnl, concern for underlying infection low        4. Hypercalcemia: likely 2/2 dehydration    - PTH and Vit D wnl    - Does have thyroid nodule noted on imaging, can obtain outpatient thyroid ultrasound        5.Hypertension    - Hold home amlodipine 5mg qd in setting of fall, hypotension        Inpatient treatment course:     Imaging on admission (CT head/neck and hip) all negative/wnl. Pt with slightly elevated Cr (1.76) (consistent with prior Cr), now improved following 2 days IVF. PT evaluation to assess for appropriate level of care upon discharge, for which they recommend [facility]I        New medications: NA        Labs to be followed outpatient:     Obtain thyroid ultrasound    F/u Cr        Exam to be followed outpatient: NA

## 2020-06-30 NOTE — PROGRESS NOTE ADULT - ATTENDING COMMENTS
pt seen and examined by me case d/w housestaff agree with VS, PE, asssessment and plan as outlined above

## 2020-06-30 NOTE — PROGRESS NOTE ADULT - PROBLEM SELECTOR PLAN 1
- Likely due to dehydration, given other laboratory abnormalities below which also pointed to hemoconcentration; now improving s/p IVF, however also holding home amlodipine given concern for hypotension contributing  - S/p imaging without acute fracture and no intracranial injury  - PT consult- recommended FEI

## 2020-07-01 ENCOUNTER — TRANSCRIPTION ENCOUNTER (OUTPATIENT)
Age: 85
End: 2020-07-01

## 2020-07-01 PROCEDURE — 99233 SBSQ HOSP IP/OBS HIGH 50: CPT | Mod: GC

## 2020-07-01 RX ORDER — METOPROLOL TARTRATE 50 MG
1 TABLET ORAL
Qty: 0 | Refills: 0 | DISCHARGE
Start: 2020-07-01

## 2020-07-01 RX ORDER — ACETAMINOPHEN 500 MG
650 TABLET ORAL ONCE
Refills: 0 | Status: COMPLETED | OUTPATIENT
Start: 2020-07-01 | End: 2020-07-01

## 2020-07-01 RX ADMIN — ENOXAPARIN SODIUM 30 MILLIGRAM(S): 100 INJECTION SUBCUTANEOUS at 10:18

## 2020-07-01 RX ADMIN — Medication 25 MILLIGRAM(S): at 05:48

## 2020-07-01 RX ADMIN — Medication 650 MILLIGRAM(S): at 11:34

## 2020-07-01 NOTE — PROGRESS NOTE ADULT - SUBJECTIVE AND OBJECTIVE BOX
Physical Medicine and Rehabilitation Progress Note:    Patient is a 89y old  Female who presents with a chief complaint of CAYLA, mechanical fall (30 Jun 2020 14:10)      HPI:  Patient is an 89 year old F with PMH MR, dementia, HTN, with noted recent admission for CAYLA and weakness, s/p dc to [facility] and return to home yesterday, who experienced fall last night. Patient states that she fell out of bed, did not strike her head or lose consciousness. She was able to walk throughout the day with her walker, however, when a visiting nurse came to house she was concerned about the patient's fall and possible need for additional social resources. 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 At [facility] ED T36.5, HR 77, 98/50, RR18 sat 95% RA. Labs with WBC 10.9, creatinine 1.7 (baseline ~1.2). Trop 0.02 (baseline). CT head and C-spine negative for fracture, moderate spondylosis of C spine. EKG sinus with PAC, nonspecific T changes. Covid negative. Got 500cc bolus in ED. (27 Jun 2020 21:52)                Vital Signs Last 24 Hrs  T(C): 36.6 (01 Jul 2020 09:53), Max: 36.8 (01 Jul 2020 05:56)  T(F): 97.9 (01 Jul 2020 09:53), Max: 98.2 (01 Jul 2020 05:56)  HR: 64 (01 Jul 2020 09:53) (64 - 87)  BP: 135/67 (01 Jul 2020 09:53) (126/64 - 135/67)  BP(mean): --  RR: 18 (01 Jul 2020 09:53) (16 - 18)  SpO2: 100% (01 Jul 2020 09:53) (99% - 100%)    MEDICATIONS  (STANDING):  enoxaparin Injectable 30 milliGRAM(s) SubCutaneous every 24 hours  metoprolol tartrate 25 milliGRAM(s) Oral two times a day    MEDICATIONS  (PRN):    Currently Undergoing Physical/ Occupational Therapy at bedside.    Functional Status Assessment:         Cognitive/Perceptual/Neuro  Cognitive/Neuro/Behavioral [WDL Definition: Alert; opens eyes spontaneously; arouses to voice or touch; oriented x 4; follows commands; speech spontaneous, logical; purposeful motor response; behavior appropriate to situation]: WDL except  Level of Consciousness: confused;  alert  Orientation: disoriented to;  time    Therapeutic Interventions      Bed Mobility  Bed Mobility Training Scooting: dependent (less than 25% patient effort);  2 person assist  Bed Mobility Training Sit-to-Supine: maximum assist (25% patient effort);  2 person assist;  verbal cues  Bed Mobility Training Supine-to-Sit: minimum assist (75% patient effort);  moderate assist (50% patient effort);  1 person assist;  verbal cues  Bed Mobility Training Limitations: decreased ability to use arms for pushing/pulling;  decreased ability to use legs for bridging/pushing;  cognitive, decreased safety awareness;  impaired vision;  pain    Sit-Stand Transfer Training  Sit-to-Stand Transfer Training Treatment not Performed: patient declined            PM&R Impression: as above    Current Disposition Plan Recommendations:    subacute rehab placement

## 2020-07-01 NOTE — DISCHARGE NOTE NURSING/CASE MANAGEMENT/SOCIAL WORK - NSCORESITESY/N_GEN_A_CORE_RD
SUBJECTIVE:  Anita Arce is a 60 year old year old female seen today for a physical. She recently had lab lab work done and she is here today to review those results. TSH is borderline high at 4.764, HDL is low at 47, triglycerides are elevated at 181, glucose is slightly elevated at 100. All other labs are stable.     ALLERGIES:   Allergen Reactions   • Morphine DIZZINESS and PRURITUS   • Vicodin [Hydrocodone-Acetaminophen] DIZZINESS and PRURITUS       Current Outpatient Prescriptions   Medication Sig Dispense Refill   • CELEBREX 200 MG capsule TAKE ONE CAPSULE BY MOUTH TWICE DAILY 180 capsule 0   • simvastatin (ZOCOR) 20 MG tablet Take 1 tablet by mouth nightly. 90 tablet 0   • amoxicillin (AMOXIL) 500 MG tablet Take 4 tablets po 1 hour before dental procedures. 4 tablet 3   • zolpidem (AMBIEN) 5 MG tablet Take 1 tablet by mouth nightly as needed for Sleep. 90 tablet 1   • levothyroxine (SYNTHROID, LEVOTHROID) 25 MCG tablet TAKE ONE TABLET BY MOUTH DAILY 90 tablet 3   • albuterol 108 (90 BASE) MCG/ACT inhaler 1-2 INHALATIONS EVERY 6 HOURS AS NEEDED PRN. 1 Inhaler 1   • omeprazole (PRILOSEC) 20 MG capsule Take 1 capsule by mouth daily. 90 capsule 3   • CANASA 1000 MG suppository INSERT ONE SUPPOSITORY RECTALLY AT BEDTIME 30 suppository 0   • clobetasol (TEMOVATE) 0.05 % external solution Apply topically to the scalp twice daily as needed 2 or 3 times a week for psoriasis. 50 mL 2   • Ascorbic Acid (VITAMIN C) 1000 MG tablet Take 1 tablet by mouth daily.     • aspirin 81 MG tablet Take 1 tablet by mouth daily.     • Glucosamine-Chondroitin-MSM TABS Take 2 tablets of 1200 mg by mouth in the morning. Take 2 tablets of 1500 mg at night.     • Dispense (CHECK, UNKNOWN CONCENTRATION) Flax Seed Oil.     • B Complex Vitamins (VITAMIN B COMPLEX) tablet Take 1 tablet by mouth daily.     • Omega-3 Fatty Acids (FISH OIL) 1000 MG capsule Take 1 g by mouth daily.         Past Medical History:   Diagnosis Date   • Colon polyps     • Dyslipidemia    • GERD (gastroesophageal reflux disease)    • Hemorrhoids    • Hypothyroidism    • Osteoarthritis of both knees    • Palpitations 02/10/2016    Negative cardiac workup, negative Holter monitor   • Psoriasis of scalp    • Ulcerative colitis        Past Surgical History:   Procedure Laterality Date   • CARDIAC STRESS TEST COMPLETE  03/04/2016    Normal, no ischemia   • COLONOSCOPY W/ BIOPSIES AND POLYPECTOMY  05/03/2012    polyp, ulcerative colitis repeat in 2017   • DEXA BONE DENSITY AXIAL SKELETON  05/04/2016    Normal   • ECHOCARDIOGRAM  03/04/2016    Normal   • FECAL OCCULT BLOOD (IMMUNOCHEM)  03/26/2012   • HOLTER MONITOR WEARABLE  02/22/2016    Worn x 30 days, normal/no arrhythmias   • LUMBAR LAMINECTOMY  1991    L4-L5   • TONSILLECTOMY AND ADENOIDECTOMY  1964   • TOTAL ABDOMINAL HYSTERECTOMY W/ BILATERAL SALPINGOOPHORECTOMY      R/T fibroids   • TOTAL KNEE REPLACEMENT  09/12/2012    right knee   • TOTAL KNEE REPLACEMENT  11/14/2012    left knee       Family History   Problem Relation Age of Onset   • Hypertension Mother    • Hypertension Father    • Stroke Father    • Coronary Artery Disease Father    • Coronary Artery Disease Maternal Grandfather    • Diabetes Maternal Grandfather    • Coronary Artery Disease Paternal Grandfather    • Stroke Paternal Grandfather        SOCIAL HISTORY:  Relationships: She is  and has 2 adult children and 4 grandchildren.  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  Education: College graduate.  Tobacco: She quit smoking cigarettes in 1986.  Recreational drugs: Denies  Alcohol: Has 4-5 drinks a week.  Exercise: Likes to water ski and walk. Has not been exercising recently due to busy schedule.  Nutrition:  General diet.  Caffeine has 1-2 cups of coffee once or twice a week.    REVIEW OF SYSTEMS:  General:  Denies fever, or chills. She has gained 8 pounds since last year's physical.  Skin:  Denies any concerning  lesions. No hair or nail concerns.  No bruising.  Eyes:  Denies blurry vision, double vision, glaucoma or cataracts. Wears glasses.  ENT:  Denies hearing concerns, sinus congestion, rhinorrhea, difficulty swallowing or hoarse voice. Sees the dentist regularly.   Cardiovascular:  Denies chest pain, dyspnea on exertion, palpitations, leg pain or peripheral edema.  Respiratory:  Denies shortness of breath, wheezing, coughing or snoring.  Breasts:  Denies lumps, pain, skin changes or nipple discharge.  Does regular self breast exams.  GI:  Denies abdominal pain, nausea, vomiting, diarrhea, constipation, black stools or bleeding. Has a history of ulcerative colitis and proctitis. He states these have been well controlled. She is due for colonoscopy this year. She's been having some increasing gastric reflux. Usually uses omeprazole which she feels is no longer effective. Her reflux is daily.  :  Denies urinary pain, frequency, urgency, bleeding or incontinence.    GYN:  Denies vaginal pain, itching or discharge. Had a hysterectomy with BSO due to fibroids.  2 para 2.    Musculoskeletal:  Has generalized osteoarthritis controlled with Celebrex. She uses Celebrex cautiously due to her history of ulcerative colitis. Has had bilateral knee replacements.   Neurologic:  Denies headache, dizziness, numbness, seizure or memory problems.  Psychological:  Denies depression, anxiety, or compulsions.    OBJECTIVE:  Blood pressure (!) 136/94, pulse 88, temperature 98 °F (36.7 °C), temperature source Oral, resp. rate 16, height 5' 4\" (1.626 m), weight 103.1 kg. Body mass index is 39.03 kg/(m^2).   General:  Alert pleasant female in no acute distress.  Skin:  Warm, pink and dry.  Eyes:  PERRLA.  Sclera white.  Funduscopic red reflex noted.    HENT:  Head: Normocephalic, atraumatic.  Ears: TM's intact.  Landmarks noted.  No erythema or drainage. External canals open. Hearing appropriate to conversation. Nose: Bilateral nares  No patent.  No drainage. Mouth: Mucosa pink and moist. Posterior pharynx normal. Neck: Soft, supple and no lymphadenopathy.  Thyroid nonpalpable.  Cardiovascular:  Regular rate, normal S1, S2.  No murmur gallop or rub.  Radial, femoral and dorsalis pedis pulses 2+ bilaterally.  No peripheral edema.  Respiratory:  Lungs clear to auscultation.  No wheezes, rhonchi or crackles.  Breasts:  Examined sitting and supine.  Size and shape are normal.  Soft and nontender to palpation.  No masses or color changes.  Nipples without inversion or discharge. No axillary or infraclavicular lymphadenopathy.  GI/:  Abdomen soft, nontender, no organomegaly or masses.  GYN:  She is deferring a pelvic exam, she has no concerns.  Musculoskeletal:  Upper and lower extremities symmetric with equal strength 5/5 bilaterally.  Freely moving all 4 extremities.    Neurologic:  Alert, oriented, speech clear. Cranial nerves intact.  DTR's 2+ and grossly intact throughout.  Psychologic: Alert, good eye contact, thoughts connected.  Dress and appearance appropriate.    LABS:   Recent lab work reviewed.    ASSESSMENT:  1. Annual physical exam.  2. Dyslipidemia.  3. Hypothyroidism.  4. GERD.  5. History of colon polyps.  6. Ulcerative colitis.  7. Chronic insomnia.  8. Generalized osteoarthritis.  9. Elevated blood pressure.  10. Obesity, BMI 39.1.    PLAN:  1. Discussed health and wellness.  Recommend a low fat/low cholesterol/low salt diet.  Exercise for at least 30 minutes most days of the week.  Work on weight loss.  Calcium 1200 mg and Vitamin D 1000 IU daily.  Do self breast exam monthly. She is scheduled for mammogram.   Alcohol and caffeine in moderation.  Anita was given written informational material on diet, exercise, weight loss, calcium/vitamin D, breast health, cholesterol and power of  for health care. We reviewed recent lab results. Glucose is slightly elevated. She will work on lifestyle changes.  2. Triglycerides are slightly  elevated at 181. We discussed switching from simvastatin to atorvastatin. She would like to continue on simvastatin therefore she is given a refill for 1 year. Continue working on lifestyle changes.  3. TSH is on the higher side of normal. Will increase levothyroxine to 50 mcg daily and recheck TSH in 3 months. We discussed the signs and symptoms of hypothyroidism and hyperthyroidism.  4. She does not feel that omeprazole has been effective for her GERD. Therefore she is given a prescription for Protonix 40 mg daily. We discussed a GERD diet. She is due for colonoscopy this year due to her history of ulcerative colitis, and colon polyps. Will also schedule EGD due to her increasing GERD.  5. She will contact the clinic when needing a refill of zolpidem for chronic insomnia. Recommend that she avoid caffeine and again increase her exercise. We reviewed Sleep hygiene.  6. She will continue to use Celebrex for her generalized osteoarthritis cautiously due to her history of ulcerative colitis and GERD. We discussed the risks of GI problems including bleeding.  7. Recheck of her blood pressure today 154/100. I have recommended that she have her blood pressure rechecked with the nurse in 2 weeks. If blood pressure remains elevated, may need to start antihypertensive. Strongly encouraged her to work on weight loss, diet and her size. Avoid salt.  8. Return to the clinic in 1 year for routine physical, and as needed.

## 2020-07-01 NOTE — DISCHARGE NOTE NURSING/CASE MANAGEMENT/SOCIAL WORK - PATIENT PORTAL LINK FT
You can access the FollowMyHealth Patient Portal offered by [facility] by registering at the following website: [email or web address]. By joining [employer]’s FollowMyHealth portal, you will also be able to view your health information using other applications (apps) compatible with our system.

## 2020-07-01 NOTE — PROGRESS NOTE ADULT - SUBJECTIVE AND OBJECTIVE BOX
OVERNIGHT EVENTS: No acute events overnight.    SUBJECTIVE / INTERVAL HPI: Patient seen and examined at bedside. Up and conversant this morning, sitting on the side of the bed. Complains still of L leg pain on repositioning. Otherwise, no fevers/chills, nausea, vomiting, diarrhea, abdominal pain, chest pain, shortness of breath.    VITAL SIGNS:  Vital Signs Last 24 Hrs  T(C): 36.8 (01 Jul 2020 16:07), Max: 36.8 (01 Jul 2020 05:56)  T(F): 98.2 (01 Jul 2020 16:07), Max: 98.2 (01 Jul 2020 05:56)  HR: 74 (01 Jul 2020 16:07) (64 - 87)  BP: 113/70 (01 Jul 2020 16:07) (113/70 - 135/67)  BP(mean): --  RR: 18 (01 Jul 2020 16:07) (16 - 18)  SpO2: 99% (01 Jul 2020 16:07) (99% - 100%)    PHYSICAL EXAM:    General: Elderly woman sitting up in bed, conversant  HEENT: NC/AT; anicteric sclera; MMM  Neck: supple  Cardiovascular: +S1/S2; RRR  Respiratory: CTA B/L; no W/R/R  Gastrointestinal: soft, NT/ND; +BSx4  Extremities: WWP; no edema, clubbing or cyanosis  Vascular: 2+ radial, DP pulses B/L  Neurological: AAOx3, no focal deficits    MEDICATIONS:  MEDICATIONS  (STANDING):  enoxaparin Injectable 30 milliGRAM(s) SubCutaneous every 24 hours  metoprolol tartrate 25 milliGRAM(s) Oral two times a day    MEDICATIONS  (PRN):      ALLERGIES:  Allergies    No Known Allergies    Intolerances    RADIOLOGY & ADDITIONAL TESTS: Reviewed.

## 2020-07-02 VITALS
DIASTOLIC BLOOD PRESSURE: 69 MMHG | RESPIRATION RATE: 16 BRPM | SYSTOLIC BLOOD PRESSURE: 124 MMHG | TEMPERATURE: 98 F | HEART RATE: 84 BPM | OXYGEN SATURATION: 99 %

## 2020-07-02 PROCEDURE — 97116 GAIT TRAINING THERAPY: CPT

## 2020-07-02 PROCEDURE — 82310 ASSAY OF CALCIUM: CPT

## 2020-07-02 PROCEDURE — 72125 CT NECK SPINE W/O DYE: CPT

## 2020-07-02 PROCEDURE — 80053 COMPREHEN METABOLIC PANEL: CPT

## 2020-07-02 PROCEDURE — 97530 THERAPEUTIC ACTIVITIES: CPT

## 2020-07-02 PROCEDURE — 70450 CT HEAD/BRAIN W/O DYE: CPT

## 2020-07-02 PROCEDURE — 83970 ASSAY OF PARATHORMONE: CPT

## 2020-07-02 PROCEDURE — 99285 EMERGENCY DEPT VISIT HI MDM: CPT | Mod: 25

## 2020-07-02 PROCEDURE — 73700 CT LOWER EXTREMITY W/O DYE: CPT

## 2020-07-02 PROCEDURE — 82550 ASSAY OF CK (CPK): CPT

## 2020-07-02 PROCEDURE — 71045 X-RAY EXAM CHEST 1 VIEW: CPT

## 2020-07-02 PROCEDURE — 84100 ASSAY OF PHOSPHORUS: CPT

## 2020-07-02 PROCEDURE — 87635 SARS-COV-2 COVID-19 AMP PRB: CPT

## 2020-07-02 PROCEDURE — 85027 COMPLETE CBC AUTOMATED: CPT

## 2020-07-02 PROCEDURE — 83735 ASSAY OF MAGNESIUM: CPT

## 2020-07-02 PROCEDURE — 84484 ASSAY OF TROPONIN QUANT: CPT

## 2020-07-02 PROCEDURE — 97161 PT EVAL LOW COMPLEX 20 MIN: CPT

## 2020-07-02 PROCEDURE — 82306 VITAMIN D 25 HYDROXY: CPT

## 2020-07-02 PROCEDURE — 85025 COMPLETE CBC W/AUTO DIFF WBC: CPT

## 2020-07-02 PROCEDURE — 36415 COLL VENOUS BLD VENIPUNCTURE: CPT

## 2020-07-02 RX ADMIN — Medication 25 MILLIGRAM(S): at 06:26

## 2020-07-02 RX ADMIN — ENOXAPARIN SODIUM 30 MILLIGRAM(S): 100 INJECTION SUBCUTANEOUS at 07:52

## 2020-07-02 NOTE — PROGRESS NOTE ADULT - REASON FOR ADMISSION
CAYLA, mechanical fall

## 2020-07-02 NOTE — PROGRESS NOTE ADULT - PROVIDER SPECIALTY LIST ADULT
Internal Medicine
Rehab Medicine
Rehab Medicine
Internal Medicine

## 2020-07-02 NOTE — PROGRESS NOTE ADULT - PROBLEM SELECTOR PLAN 3
- Likely due to dehydration consistent with previous creatinine range during last hospitalization, Cr improved s/p IVF
- Likely due to dehydration consistent with previous creatinine range during last hospitalization, f/u in AM now s/p 500cc NS bolus. If remains elevated can send urine lytes for FeNa  - F/u UA, not endorsing urinary symptoms
- Likely due to dehydration consistent with previous creatinine range during last hospitalization, Cr improved s/p IVF

## 2020-07-02 NOTE — PROGRESS NOTE ADULT - PROBLEM SELECTOR PLAN 2
-Likely due to hemoconcentration/dehydration, now resolved s/p IVF  -Remains afebrile, CXR 6/27 wnl, concern for underlying infection low -Likely due to hemoconcentration/dehydration, now resolved s/p IVF

## 2020-07-02 NOTE — PROGRESS NOTE ADULT - PROBLEM SELECTOR PLAN 1
- Likely due to dehydration, given other laboratory abnormalities below which also pointed to hemoconcentration; now improving s/p IVF, however also holding home amlodipine given concern for hypotension contributing  - S/p imaging without acute fracture and no intracranial injury  - PT consult- recommended FEI - Likely due to dehydration, given other laboratory abnormalities below which also pointed to hemoconcentration; Improved s/p IVF  - Imaging without acute fracture and no intracranial injury  - PT consult- recommended FEI

## 2020-07-02 NOTE — PROGRESS NOTE ADULT - PROBLEM SELECTOR PLAN 5
-orthostatic BP 6/28 wnl  - Hold home amlodipine 5mg qd in setting of fall, hypotension - Hold home amlodipine 5mg qd in setting of fall, hypotension  -c/w home metoprolol

## 2020-07-02 NOTE — PROGRESS NOTE ADULT - PROBLEM SELECTOR PLAN 8
Lovenox ppx  DASH diet  Dispo FEI

## 2020-07-02 NOTE — PROGRESS NOTE ADULT - PROBLEM SELECTOR PLAN 6
- 0.02 on admission, consistent with previous levels  - No ACS symptoms, EKG with no acute changes, likely 2/2 demand ischemia vs. poor clearance; have not been trending  -No need to continue to trend  - No need to trend trops at this time as clinical symptoms, labs, EKG not suggestive of ACS - 0.02 on admission, consistent with previous levels, never had any ACS sx, EKG nml, likely type 2/demand ischemia, have not been trending.

## 2020-07-02 NOTE — PROGRESS NOTE ADULT - PROBLEM SELECTOR PROBLEM 3
CAYLA (acute kidney injury)

## 2020-07-02 NOTE — PROGRESS NOTE ADULT - PROBLEM SELECTOR PROBLEM 6
Troponin level elevated

## 2020-07-02 NOTE — PROGRESS NOTE ADULT - SUBJECTIVE AND OBJECTIVE BOX
OVERNIGHT EVENTS:    SUBJECTIVE / INTERVAL HPI: Patient seen and examined at bedside. No fevers/chills, nausea, vomiting, diarrhea, abdominal pain, chest pain, shortness of breath.    VITAL SIGNS:  Vital Signs Last 24 Hrs  T(C): 36.4 (02 Jul 2020 05:55), Max: 36.8 (01 Jul 2020 16:07)  T(F): 97.5 (02 Jul 2020 05:55), Max: 98.2 (01 Jul 2020 16:07)  HR: 97 (02 Jul 2020 05:55) (64 - 97)  BP: 147/63 (02 Jul 2020 05:55) (107/75 - 147/63)  BP(mean): --  RR: 18 (02 Jul 2020 05:55) (18 - 18)  SpO2: 98% (02 Jul 2020 05:55) (98% - 100%)    PHYSICAL EXAM:    General: Elderly woman sitting up in bed, conversant  HEENT: NC/AT; anicteric sclera; MMM  Neck: supple  Cardiovascular: +S1/S2; RRR  Respiratory: CTA B/L; no W/R/R  Gastrointestinal: soft, NT/ND; +BSx4  Extremities: WWP; no edema, clubbing or cyanosis  Vascular: 2+ radial, DP pulses B/L  Neurological: AAOx3, no focal deficits    MEDICATIONS:  MEDICATIONS  (STANDING):  enoxaparin Injectable 30 milliGRAM(s) SubCutaneous every 24 hours  metoprolol tartrate 25 milliGRAM(s) Oral two times a day    MEDICATIONS  (PRN):      ALLERGIES:  Allergies    No Known Allergies    Intolerances      RADIOLOGY & ADDITIONAL TESTS: Reviewed. OVERNIGHT EVENTS: No acute events overnight. Pt refused metoprolol    SUBJECTIVE / INTERVAL HPI: Patient seen and examined at bedside. Sleeping this morning, but in good spirits when awakened. No fevers/chills, nausea, vomiting, diarrhea, abdominal pain, chest pain, shortness of breath. Chronic left leg pain still complaint.    VITAL SIGNS:  Vital Signs Last 24 Hrs  T(C): 36.4 (02 Jul 2020 05:55), Max: 36.8 (01 Jul 2020 16:07)  T(F): 97.5 (02 Jul 2020 05:55), Max: 98.2 (01 Jul 2020 16:07)  HR: 97 (02 Jul 2020 05:55) (64 - 97)  BP: 147/63 (02 Jul 2020 05:55) (107/75 - 147/63)  BP(mean): --  RR: 18 (02 Jul 2020 05:55) (18 - 18)  SpO2: 98% (02 Jul 2020 05:55) (98% - 100%)    PHYSICAL EXAM:    General: Elderly woman sleeping in bed  HEENT: NC/AT  Cardiovascular: +S1/S2; RRR  Respiratory: CTA B/L; no W/R/R  Gastrointestinal: soft, NT/ND; +BSx4  Extremities: WWP; no edema, clubbing or cyanosis  Vascular: 2+ radial, DP pulses B/L    MEDICATIONS:  MEDICATIONS  (STANDING):  enoxaparin Injectable 30 milliGRAM(s) SubCutaneous every 24 hours  metoprolol tartrate 25 milliGRAM(s) Oral two times a day    MEDICATIONS  (PRN):      ALLERGIES:  Allergies    No Known Allergies    Intolerances      RADIOLOGY & ADDITIONAL TESTS: Reviewed.

## 2020-07-02 NOTE — PROGRESS NOTE ADULT - ASSESSMENT
88 yo woman with PMH MR, mild dementia, HTN, and glaucoma who presented after rolling out of bed, subsequently found to have and CAYLA, now resolving with IVFs. Admitted in setting of mechanical fall with anticipated increased support needs at home.
88 yo woman with PMH MR, mild dementia, HTN, and glaucoma who presented after rolling out of bed, subsequently found to have and CAYLA, which resolved with IVFs. Admitted in setting of mechanical fall. Now awaiting discharge to [facility].
89F PMH MR, dementia, HTN, admitted in setting of mechanical fall with anticipated need for increased support
89 yo woman with PMH MR, mild dementia, HTN, and glaucoma who presented after rolling out of bed, subsequently found to have and CAYLA, now resolving with IVFs. Admitted in setting of mechanical fall with anticipated increased support needs at home.
per Internal Medicine    89 yo woman with PMH MR, mild dementia, HTN, and glaucoma who presented after rolling out of bed, subsequently found to have and CAYLA, now resolving with IVFs. Admitted in setting of mechanical fall with anticipated increased support needs at home.    Problem/Plan - 1:  ·  Problem: Fall, initial encounter.  Plan: - Likely due to dehydration, given other laboratory abnormalities below which also pointed to hemoconcentration; now improving s/p IVF, however also holding home amlodipine given concern for hypotension contributing  - S/p imaging without acute fracture and no intracranial injury  - PT consult- recommended FEI.     Problem/Plan - 2:  ·  Problem: Leukocytosis.  Plan: -Likely due to hemoconcentration/dehydration, now resolved s/p IVF  -Remains afebrile, CXR 6/27 wnl, concern for underlying infection low.     Problem/Plan - 3:  ·  Problem: CAYLA (acute kidney injury).  Plan: - Likely due to dehydration consistent with previous creatinine range during last hospitalization, Cr improved s/p IVF.     Problem/Plan - 4:  ·  Problem: Hypercalcemia.  Plan: - PTH and Vit D wnl; likely 2/2 dehydration  - Outpatient thyroid ultrasound follow-up recommended.     Problem/Plan - 5:  ·  Problem: Hypertension.  Plan: -orthostatic BP 6/28 wnl  - Hold home amlodipine 5mg qd in setting of fall, hypotension.     Problem/Plan - 6:  Problem: Troponin level elevated. Plan: - 0.02 on admission, consistent with previous levels  - No ACS symptoms, EKG with no acute changes, likely 2/2 demand ischemia vs. poor clearance; have not been trending  -No need to continue to trend  - No need to trend trops at this time as clinical symptoms, labs, EKG not suggestive of ACS.    Problem/Plan - 7:  ·  Problem: R/O Tachycardia, unspecified.  Plan: -Pt tachycardic this AM, takes home metoprolol tartrate therefore re-prescribed 25mg lopressor BID.     Problem/Plan - 8:  ·  Problem: Prophylactic measure.  Plan: Lovenox ppx  DASH diet
per Internal Medicine    89 yo woman with PMH MR, mild dementia, HTN, and glaucoma who presented after rolling out of bed, subsequently found to have and CAYLA, now resolving with IVFs. Admitted in setting of mechanical fall with anticipated increased support needs at home.    Problem/Plan - 1:  ·  Problem: Fall, initial encounter.  Plan: - Likely due to dehydration, given other laboratory abnormalities below which also pointed to hemoconcentration; now improving s/p IVF, however also holding home amlodipine given concern for hypotension contributing  - S/p imaging without acute fracture and no intracranial injury  - PT consult- recommended FEI.     Problem/Plan - 2:  ·  Problem: Leukocytosis.  Plan: -Likely due to hemoconcentration/dehydration, now resolved s/p IVF  -Remains afebrile, CXR 6/27 wnl, concern for underlying infection low.     Problem/Plan - 3:  ·  Problem: CAYLA (acute kidney injury).  Plan: - Likely due to dehydration consistent with previous creatinine range during last hospitalization, Cr improved s/p IVF.     Problem/Plan - 4:  ·  Problem: Hypercalcemia.  Plan: - PTH and Vit D wnl; likely 2/2 dehydration  - Outpatient thyroid ultrasound follow-up recommended.     Problem/Plan - 5:  ·  Problem: Hypertension.  Plan: -orthostatic BP 6/28 wnl  - Hold home amlodipine 5mg qd in setting of fall, hypotension.     Problem/Plan - 6:  Problem: Troponin level elevated. Plan: - 0.02 on admission, consistent with previous levels  - No ACS symptoms, EKG with no acute changes, likely 2/2 demand ischemia vs. poor clearance; have not been trending  -No need to continue to trend  - No need to trend trops at this time as clinical symptoms, labs, EKG not suggestive of ACS.    Problem/Plan - 7:  ·  Problem: R/O Tachycardia, unspecified.  Plan: -Pt tachycardic this AM, takes home metoprolol tartrate therefore re-prescribed 25mg lopressor BID.     Problem/Plan - 8:  ·  Problem: Prophylactic measure.  Plan: Lovenox ppx  DASH diet
88 yo woman with PMH MR, mild dementia, HTN, and glaucoma who presented after rolling out of bed, subsequently found to have and CAYLA, which resolved with IVFs. Admitted in setting of mechanical fall. Now awaiting discharge to [facility].

## 2020-07-07 DIAGNOSIS — N17.9 ACUTE KIDNEY FAILURE, UNSPECIFIED: ICD-10-CM

## 2020-07-07 DIAGNOSIS — D72.829 ELEVATED WHITE BLOOD CELL COUNT, UNSPECIFIED: ICD-10-CM

## 2020-07-07 DIAGNOSIS — I34.0 NONRHEUMATIC MITRAL (VALVE) INSUFFICIENCY: ICD-10-CM

## 2020-07-07 DIAGNOSIS — F03.90 UNSPECIFIED DEMENTIA, UNSPECIFIED SEVERITY, WITHOUT BEHAVIORAL DISTURBANCE, PSYCHOTIC DISTURBANCE, MOOD DISTURBANCE, AND ANXIETY: ICD-10-CM

## 2020-07-07 DIAGNOSIS — I10 ESSENTIAL (PRIMARY) HYPERTENSION: ICD-10-CM

## 2020-07-07 DIAGNOSIS — R00.0 TACHYCARDIA, UNSPECIFIED: ICD-10-CM

## 2020-07-07 DIAGNOSIS — H40.9 UNSPECIFIED GLAUCOMA: ICD-10-CM
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
## 2020-07-07 DIAGNOSIS — E86.0 DEHYDRATION: ICD-10-CM

## 2020-07-07 DIAGNOSIS — E83.52 HYPERCALCEMIA: ICD-10-CM

## 2020-07-08 DIAGNOSIS — Z71.89 OTHER SPECIFIED COUNSELING: ICD-10-CM

## 2021-03-15 ENCOUNTER — INPATIENT (INPATIENT)
Facility: HOSPITAL | Age: 86
LOS: 7 days | Discharge: HOSPICE | DRG: 754 | End: 2021-03-23
Attending: STUDENT IN AN ORGANIZED HEALTH CARE EDUCATION/TRAINING PROGRAM | Admitting: STUDENT IN AN ORGANIZED HEALTH CARE EDUCATION/TRAINING PROGRAM
Payer: MEDICARE

## 2021-03-15 VITALS
DIASTOLIC BLOOD PRESSURE: 85 MMHG | TEMPERATURE: 100 F | OXYGEN SATURATION: 100 % | HEIGHT: 60 IN | HEART RATE: 71 BPM | SYSTOLIC BLOOD PRESSURE: 151 MMHG | RESPIRATION RATE: 18 BRPM

## 2021-03-15 DIAGNOSIS — I34.0 NONRHEUMATIC MITRAL (VALVE) INSUFFICIENCY: ICD-10-CM

## 2021-03-15 DIAGNOSIS — I10 ESSENTIAL (PRIMARY) HYPERTENSION: ICD-10-CM

## 2021-03-15 DIAGNOSIS — N18.32 CHRONIC KIDNEY DISEASE, STAGE 3B: ICD-10-CM

## 2021-03-15 DIAGNOSIS — Z86.69 PERSONAL HISTORY OF OTHER DISEASES OF THE NERVOUS SYSTEM AND SENSE ORGANS: ICD-10-CM

## 2021-03-15 DIAGNOSIS — N93.9 ABNORMAL UTERINE AND VAGINAL BLEEDING, UNSPECIFIED: ICD-10-CM

## 2021-03-15 DIAGNOSIS — C55 MALIGNANT NEOPLASM OF UTERUS, PART UNSPECIFIED: ICD-10-CM

## 2021-03-15 DIAGNOSIS — C20 MALIGNANT NEOPLASM OF RECTUM: ICD-10-CM

## 2021-03-15 DIAGNOSIS — R63.8 OTHER SYMPTOMS AND SIGNS CONCERNING FOOD AND FLUID INTAKE: ICD-10-CM

## 2021-03-15 DIAGNOSIS — Z71.89 OTHER SPECIFIED COUNSELING: ICD-10-CM

## 2021-03-15 DIAGNOSIS — E87.0 HYPEROSMOLALITY AND HYPERNATREMIA: ICD-10-CM

## 2021-03-15 DIAGNOSIS — Z98.890 OTHER SPECIFIED POSTPROCEDURAL STATES: Chronic | ICD-10-CM

## 2021-03-15 DIAGNOSIS — N17.9 ACUTE KIDNEY FAILURE, UNSPECIFIED: ICD-10-CM

## 2021-03-15 LAB
ALBUMIN SERPL ELPH-MCNC: 3.2 G/DL — LOW (ref 3.3–5)
ALBUMIN SERPL ELPH-MCNC: 3.6 G/DL — SIGNIFICANT CHANGE UP (ref 3.3–5)
ALP SERPL-CCNC: 53 U/L — SIGNIFICANT CHANGE UP (ref 40–120)
ALP SERPL-CCNC: 55 U/L — SIGNIFICANT CHANGE UP (ref 40–120)
ALT FLD-CCNC: 12 U/L — SIGNIFICANT CHANGE UP (ref 10–45)
ALT FLD-CCNC: SIGNIFICANT CHANGE UP (ref 10–45)
ANION GAP SERPL CALC-SCNC: 10 MMOL/L — SIGNIFICANT CHANGE UP (ref 5–17)
ANION GAP SERPL CALC-SCNC: 12 MMOL/L — SIGNIFICANT CHANGE UP (ref 5–17)
ANION GAP SERPL CALC-SCNC: 9 MMOL/L — SIGNIFICANT CHANGE UP (ref 5–17)
APPEARANCE UR: CLEAR — SIGNIFICANT CHANGE UP
APTT BLD: 25.7 SEC — LOW (ref 27.5–35.5)
AST SERPL-CCNC: 18 U/L — SIGNIFICANT CHANGE UP (ref 10–40)
AST SERPL-CCNC: SIGNIFICANT CHANGE UP (ref 10–40)
BASE EXCESS BLDV CALC-SCNC: -2 MMOL/L — SIGNIFICANT CHANGE UP
BASOPHILS # BLD AUTO: 0.05 K/UL — SIGNIFICANT CHANGE UP (ref 0–0.2)
BASOPHILS NFR BLD AUTO: 0.5 % — SIGNIFICANT CHANGE UP (ref 0–2)
BILIRUB SERPL-MCNC: 0.5 MG/DL — SIGNIFICANT CHANGE UP (ref 0.2–1.2)
BILIRUB SERPL-MCNC: 0.6 MG/DL — SIGNIFICANT CHANGE UP (ref 0.2–1.2)
BILIRUB UR-MCNC: NEGATIVE — SIGNIFICANT CHANGE UP
BLD GP AB SCN SERPL QL: NEGATIVE — SIGNIFICANT CHANGE UP
BUN SERPL-MCNC: 29 MG/DL — HIGH (ref 7–23)
BUN SERPL-MCNC: 33 MG/DL — HIGH (ref 7–23)
BUN SERPL-MCNC: 34 MG/DL — HIGH (ref 7–23)
CALCIUM SERPL-MCNC: 10.6 MG/DL — HIGH (ref 8.4–10.5)
CALCIUM SERPL-MCNC: 9 MG/DL — SIGNIFICANT CHANGE UP (ref 8.4–10.5)
CALCIUM SERPL-MCNC: 9.6 MG/DL — SIGNIFICANT CHANGE UP (ref 8.4–10.5)
CHLORIDE SERPL-SCNC: 112 MMOL/L — HIGH (ref 96–108)
CHLORIDE SERPL-SCNC: 113 MMOL/L — HIGH (ref 96–108)
CHLORIDE SERPL-SCNC: 118 MMOL/L — HIGH (ref 96–108)
CO2 SERPL-SCNC: 18 MMOL/L — LOW (ref 22–31)
CO2 SERPL-SCNC: 20 MMOL/L — LOW (ref 22–31)
CO2 SERPL-SCNC: 24 MMOL/L — SIGNIFICANT CHANGE UP (ref 22–31)
COLOR SPEC: YELLOW — SIGNIFICANT CHANGE UP
CREAT SERPL-MCNC: 1.42 MG/DL — HIGH (ref 0.5–1.3)
CREAT SERPL-MCNC: 1.61 MG/DL — HIGH (ref 0.5–1.3)
CREAT SERPL-MCNC: 1.69 MG/DL — HIGH (ref 0.5–1.3)
DIFF PNL FLD: NEGATIVE — SIGNIFICANT CHANGE UP
EOSINOPHIL # BLD AUTO: 0.16 K/UL — SIGNIFICANT CHANGE UP (ref 0–0.5)
EOSINOPHIL NFR BLD AUTO: 1.6 % — SIGNIFICANT CHANGE UP (ref 0–6)
GLUCOSE SERPL-MCNC: 113 MG/DL — HIGH (ref 70–99)
GLUCOSE SERPL-MCNC: 91 MG/DL — SIGNIFICANT CHANGE UP (ref 70–99)
GLUCOSE SERPL-MCNC: 97 MG/DL — SIGNIFICANT CHANGE UP (ref 70–99)
GLUCOSE UR QL: NEGATIVE — SIGNIFICANT CHANGE UP
HCO3 BLDV-SCNC: 24 MMOL/L — SIGNIFICANT CHANGE UP (ref 20–27)
HCT VFR BLD CALC: 38.3 % — SIGNIFICANT CHANGE UP (ref 34.5–45)
HCT VFR BLD CALC: 43.8 % — SIGNIFICANT CHANGE UP (ref 34.5–45)
HGB BLD-MCNC: 12.1 G/DL — SIGNIFICANT CHANGE UP (ref 11.5–15.5)
HGB BLD-MCNC: 13.9 G/DL — SIGNIFICANT CHANGE UP (ref 11.5–15.5)
IMM GRANULOCYTES NFR BLD AUTO: 0.7 % — SIGNIFICANT CHANGE UP (ref 0–1.5)
INR BLD: 1.52 — HIGH (ref 0.88–1.16)
KETONES UR-MCNC: NEGATIVE — SIGNIFICANT CHANGE UP
LACTATE SERPL-SCNC: 2.4 MMOL/L — HIGH (ref 0.5–2)
LACTATE SERPL-SCNC: 2.8 MMOL/L — HIGH (ref 0.5–2)
LEUKOCYTE ESTERASE UR-ACNC: NEGATIVE — SIGNIFICANT CHANGE UP
LIDOCAIN IGE QN: 33 U/L — SIGNIFICANT CHANGE UP (ref 7–60)
LYMPHOCYTES # BLD AUTO: 3.43 K/UL — HIGH (ref 1–3.3)
LYMPHOCYTES # BLD AUTO: 33.5 % — SIGNIFICANT CHANGE UP (ref 13–44)
MAGNESIUM SERPL-MCNC: 1.9 MG/DL — SIGNIFICANT CHANGE UP (ref 1.6–2.6)
MCHC RBC-ENTMCNC: 30.5 PG — SIGNIFICANT CHANGE UP (ref 27–34)
MCHC RBC-ENTMCNC: 30.7 PG — SIGNIFICANT CHANGE UP (ref 27–34)
MCHC RBC-ENTMCNC: 31.6 GM/DL — LOW (ref 32–36)
MCHC RBC-ENTMCNC: 31.7 GM/DL — LOW (ref 32–36)
MCV RBC AUTO: 96.1 FL — SIGNIFICANT CHANGE UP (ref 80–100)
MCV RBC AUTO: 97.2 FL — SIGNIFICANT CHANGE UP (ref 80–100)
MONOCYTES # BLD AUTO: 0.6 K/UL — SIGNIFICANT CHANGE UP (ref 0–0.9)
MONOCYTES NFR BLD AUTO: 5.9 % — SIGNIFICANT CHANGE UP (ref 2–14)
NEUTROPHILS # BLD AUTO: 5.94 K/UL — SIGNIFICANT CHANGE UP (ref 1.8–7.4)
NEUTROPHILS NFR BLD AUTO: 57.8 % — SIGNIFICANT CHANGE UP (ref 43–77)
NITRITE UR-MCNC: NEGATIVE — SIGNIFICANT CHANGE UP
NRBC # BLD: 0 /100 WBCS — SIGNIFICANT CHANGE UP (ref 0–0)
NRBC # BLD: 0 /100 WBCS — SIGNIFICANT CHANGE UP (ref 0–0)
PCO2 BLDV: 44 MMHG — SIGNIFICANT CHANGE UP (ref 41–51)
PH BLDV: 7.35 — SIGNIFICANT CHANGE UP (ref 7.32–7.43)
PH UR: 6 — SIGNIFICANT CHANGE UP (ref 5–8)
PHOSPHATE SERPL-MCNC: 2.8 MG/DL — SIGNIFICANT CHANGE UP (ref 2.5–4.5)
PLATELET # BLD AUTO: 216 K/UL — SIGNIFICANT CHANGE UP (ref 150–400)
PLATELET # BLD AUTO: 254 K/UL — SIGNIFICANT CHANGE UP (ref 150–400)
PO2 BLDV: 23 MMHG — SIGNIFICANT CHANGE UP
POTASSIUM SERPL-MCNC: 4.5 MMOL/L — SIGNIFICANT CHANGE UP (ref 3.5–5.3)
POTASSIUM SERPL-MCNC: 5.4 MMOL/L — HIGH (ref 3.5–5.3)
POTASSIUM SERPL-MCNC: SIGNIFICANT CHANGE UP (ref 3.5–5.3)
POTASSIUM SERPL-SCNC: 4.5 MMOL/L — SIGNIFICANT CHANGE UP (ref 3.5–5.3)
POTASSIUM SERPL-SCNC: 5.4 MMOL/L — HIGH (ref 3.5–5.3)
POTASSIUM SERPL-SCNC: SIGNIFICANT CHANGE UP (ref 3.5–5.3)
PROT SERPL-MCNC: 7.3 G/DL — SIGNIFICANT CHANGE UP (ref 6–8.3)
PROT SERPL-MCNC: 8.3 G/DL — SIGNIFICANT CHANGE UP (ref 6–8.3)
PROT UR-MCNC: NEGATIVE MG/DL — SIGNIFICANT CHANGE UP
PROTHROM AB SERPL-ACNC: 17.9 SEC — HIGH (ref 10.6–13.6)
RBC # BLD: 3.94 M/UL — SIGNIFICANT CHANGE UP (ref 3.8–5.2)
RBC # BLD: 4.56 M/UL — SIGNIFICANT CHANGE UP (ref 3.8–5.2)
RBC # FLD: 14 % — SIGNIFICANT CHANGE UP (ref 10.3–14.5)
RBC # FLD: 14.3 % — SIGNIFICANT CHANGE UP (ref 10.3–14.5)
RH IG SCN BLD-IMP: POSITIVE — SIGNIFICANT CHANGE UP
SAO2 % BLDV: 36 % — SIGNIFICANT CHANGE UP
SARS-COV-2 RNA SPEC QL NAA+PROBE: SIGNIFICANT CHANGE UP
SODIUM SERPL-SCNC: 142 MMOL/L — SIGNIFICANT CHANGE UP (ref 135–145)
SODIUM SERPL-SCNC: 146 MMOL/L — HIGH (ref 135–145)
SODIUM SERPL-SCNC: 148 MMOL/L — HIGH (ref 135–145)
SP GR SPEC: 1.02 — SIGNIFICANT CHANGE UP (ref 1–1.03)
UROBILINOGEN FLD QL: 0.2 E.U./DL — SIGNIFICANT CHANGE UP
WBC # BLD: 10.25 K/UL — SIGNIFICANT CHANGE UP (ref 3.8–10.5)
WBC # BLD: 11.21 K/UL — HIGH (ref 3.8–10.5)
WBC # FLD AUTO: 10.25 K/UL — SIGNIFICANT CHANGE UP (ref 3.8–10.5)
WBC # FLD AUTO: 11.21 K/UL — HIGH (ref 3.8–10.5)

## 2021-03-15 PROCEDURE — 74177 CT ABD & PELVIS W/CONTRAST: CPT | Mod: 26,ME

## 2021-03-15 PROCEDURE — 99223 1ST HOSP IP/OBS HIGH 75: CPT | Mod: GC

## 2021-03-15 PROCEDURE — G1004: CPT

## 2021-03-15 PROCEDURE — 71045 X-RAY EXAM CHEST 1 VIEW: CPT | Mod: 26

## 2021-03-15 PROCEDURE — 99285 EMERGENCY DEPT VISIT HI MDM: CPT

## 2021-03-15 RX ORDER — SODIUM CHLORIDE 9 MG/ML
1000 INJECTION, SOLUTION INTRAVENOUS
Refills: 0 | Status: DISCONTINUED | OUTPATIENT
Start: 2021-03-15 | End: 2021-03-16

## 2021-03-15 RX ORDER — IOHEXOL 300 MG/ML
30 INJECTION, SOLUTION INTRAVENOUS ONCE
Refills: 0 | Status: COMPLETED | OUTPATIENT
Start: 2021-03-15 | End: 2021-03-15

## 2021-03-15 RX ORDER — SODIUM CHLORIDE 9 MG/ML
1000 INJECTION INTRAMUSCULAR; INTRAVENOUS; SUBCUTANEOUS ONCE
Refills: 0 | Status: COMPLETED | OUTPATIENT
Start: 2021-03-15 | End: 2021-03-15

## 2021-03-15 RX ORDER — TIMOLOL 0.5 %
1 DROPS OPHTHALMIC (EYE) EVERY 12 HOURS
Refills: 0 | Status: DISCONTINUED | OUTPATIENT
Start: 2021-03-16 | End: 2021-03-23

## 2021-03-15 RX ORDER — BRIMONIDINE TARTRATE 2 MG/MG
1 SOLUTION/ DROPS OPHTHALMIC EVERY 12 HOURS
Refills: 0 | Status: DISCONTINUED | OUTPATIENT
Start: 2021-03-16 | End: 2021-03-23

## 2021-03-15 RX ORDER — SODIUM CHLORIDE 9 MG/ML
500 INJECTION, SOLUTION INTRAVENOUS
Refills: 0 | Status: DISCONTINUED | OUTPATIENT
Start: 2021-03-15 | End: 2021-03-15

## 2021-03-15 RX ORDER — LATANOPROST 0.05 MG/ML
1 SOLUTION/ DROPS OPHTHALMIC; TOPICAL AT BEDTIME
Refills: 0 | Status: DISCONTINUED | OUTPATIENT
Start: 2021-03-16 | End: 2021-03-23

## 2021-03-15 RX ORDER — SODIUM ZIRCONIUM CYCLOSILICATE 10 G/10G
5 POWDER, FOR SUSPENSION ORAL ONCE
Refills: 0 | Status: COMPLETED | OUTPATIENT
Start: 2021-03-15 | End: 2021-03-15

## 2021-03-15 RX ADMIN — SODIUM CHLORIDE 1000 MILLILITER(S): 9 INJECTION INTRAMUSCULAR; INTRAVENOUS; SUBCUTANEOUS at 13:44

## 2021-03-15 RX ADMIN — IOHEXOL 30 MILLILITER(S): 300 INJECTION, SOLUTION INTRAVENOUS at 11:09

## 2021-03-15 RX ADMIN — SODIUM CHLORIDE 1000 MILLILITER(S): 9 INJECTION INTRAMUSCULAR; INTRAVENOUS; SUBCUTANEOUS at 12:36

## 2021-03-15 RX ADMIN — SODIUM ZIRCONIUM CYCLOSILICATE 5 GRAM(S): 10 POWDER, FOR SUSPENSION ORAL at 23:46

## 2021-03-15 RX ADMIN — SODIUM CHLORIDE 1000 MILLILITER(S): 9 INJECTION INTRAMUSCULAR; INTRAVENOUS; SUBCUTANEOUS at 11:09

## 2021-03-15 RX ADMIN — SODIUM CHLORIDE 60 MILLILITER(S): 9 INJECTION, SOLUTION INTRAVENOUS at 19:42

## 2021-03-15 NOTE — H&P ADULT - HISTORY OF PRESENT ILLNESS
89 y/o F with PMHx MR, dementia (AAOx1 at baseline), blind in both eyes, HTN, CKD IIIb, on Eliquis for DVT ppx (?) who presents from [facility] for vaginal bleeding since 8 AM earlier today. Patient is AAOx1-2 at baseline and cannot provide answers 2/2 dementia. She was not aware that she is bleeding from her vagina and denies any chest pain, dizziness, shortness of breath, abdominal pain, nausea, vomiting. Further history unable to be obtained from patient. However, she does state that there is no history of cancer in her family.     ED Course:   Vitals: /85, P 71, R 18, T 100F, O2 100% RA  Labs: no leukocytosis or anemia, , chloride 113, BUN/Cr 34/1.69 (baseline 1.5-1.6), lactate 2.8, VBG normal, UA unremarkable   Imaging: CTAP w/oral and IV contrast: findings c/f uterine and rectal carcinoma. severe circumferential masslike mural thickening of rectum with discrete solid mass on R rectal wall (4.7x5.3 cm), pelvis shows myomatous uterus with distension of uterus with blood and internal enhancing masslike material spanning 5.0x5.9 cm. No adenopathy noted.    Consults: Gynecology and Surgery. ED contacted niece who states she would like everything done for patient.   Meds: 2L NS bolus  89 y/o F with PMHx MR, dementia (AAOx1 at baseline), blind in both eyes, HTN, CKD IIIb, on Eliquis for DVT ppx (?) who presents from [facility] for vaginal bleeding x 2 months. Patient is AAOx1-2 at baseline and cannot provide answers 2/2 dementia. In ED, partial history obtained from niece (Tania Kohler) who states that patient has had vaginal bleeding for more than 2 months and she told rehab center to bring patient into the hospital. The patient herself was not aware that she is bleeding from her vagina and denies any chest pain, dizziness, shortness of breath, abdominal pain, nausea, vomiting. Further history unable to be obtained from patient. However, she does state that there is no history of cancer in her family. Patient is on Eliquis for unknown reason, however medication was not on med rec from pharmacy.     ED Course:   Vitals: /85, P 71, R 18, T 100F, O2 100% RA  Labs: no leukocytosis or anemia, , chloride 113, BUN/Cr 34/1.69 (baseline 1.5-1.6), lactate 2.8, VBG normal, UA unremarkable   Imaging: CTAP w/oral and IV contrast: findings c/f uterine and rectal carcinoma. severe circumferential masslike mural thickening of rectum with discrete solid mass on R rectal wall (4.7x5.3 cm), pelvis shows myomatous uterus with distension of uterus with blood and internal enhancing masslike material spanning 5.0x5.9 cm. No adenopathy noted.    Consults: Gynecology and Surgery. ED contacted niece who states she would like everything done for patient.   Meds: 2L NS bolus  89 y/o F with PMHx MR, dementia (AAOx1 at baseline), glaucoma (blind in both eyes), HTN and CKD IIIb (baseline Cr 1.4-1.5) who presents from [facility] for vaginal bleeding x 2 months. Patient is AAOx1-2 at baseline and cannot provide answers 2/2 dementia. In ED, partial history obtained from niece (Tania Kohler) who states that patient has had vaginal bleeding for more than 2 months and she told rehab center to bring patient into the hospital. The patient herself was not aware that she is bleeding from her vagina and denies any chest pain, dizziness, shortness of breath, abdominal pain, nausea, vomiting. Further history unable to be obtained from patient. However, she does state that there is no history of cancer in her family. Patient is on Eliquis for unknown reason, however medication was not on med rec from pharmacy.     ED Course:   Vitals: /85, P 71, R 18, T 100F, O2 100% RA  Labs: no leukocytosis or anemia, , chloride 113, BUN/Cr 34/1.69 (baseline 1.5-1.6), lactate 2.8, VBG normal, UA unremarkable   Imaging: CTAP w/oral and IV contrast: findings c/f uterine and rectal carcinoma. severe circumferential masslike mural thickening of rectum with discrete solid mass on R rectal wall (4.7x5.3 cm), pelvis shows myomatous uterus with distension of uterus with blood and internal enhancing masslike material spanning 5.0x5.9 cm. No adenopathy noted.    Consults: Gynecology and Surgery. ED contacted niece who states she would like everything done for patient.   Meds: 2L NS bolus

## 2021-03-15 NOTE — ED ADULT NURSE NOTE - OBJECTIVE STATEMENT
Pt presents w/ hx of vaginal bleeding discovered this am. Pt brought from [facility], a&ox1 at baseline, blind in both eyes. Pt responds to verbal commands, responds to own name, speaking in unidentified language.

## 2021-03-15 NOTE — ED ADULT NURSE NOTE - NSIMPLEMENTINTERV_GEN_ALL_ED
Implemented All Universal Safety Interventions:  [place] to call system. Call bell, personal items and telephone within reach. Instruct patient to call for assistance. Room bathroom lighting operational. Non-slip footwear when patient is off stretcher. Physically safe environment: no spills, clutter or unnecessary equipment. Stretcher in lowest position, wheels locked, appropriate side rails in place.

## 2021-03-15 NOTE — H&P ADULT - NSHPPHYSICALEXAM_GEN_ALL_CORE
General: thin, elderly AA female resting in bed, pleasantly demented  HEENT: mucous membranes dry, bilateral pupils with cataracts (patient is blind at baseline), no oropharyngeal erythema, no mucosal bleeding, no thyromegaly   Cardiac: RRR, no murmurs, rubs or gallops. S1/S2 present   Respiratory: CTAB, no w/r/r. No increased work of breathing  Abdomen: soft, non-tender, non-distended, no rebound tenderness or guarding. Rectal exam deferred at this time, however no blood noted on external exam (and DHIRAJ negative from previous exams in ED). No CVAT.   Extremities: no edema, clubbing or cyanosis. WWP.   MSK: Patient has legs flexed close to abdomen and refuses to straighten out her legs for full physical exam.   : defer to gynecology - patient refusing at the time with gyn team   Neuro: AAOx1, pleasantly demented. Patient moving all 4 extremities spontaneously, gross sensation intact. However unable to complete full neurologic exam 2/2 dementia.

## 2021-03-15 NOTE — ED PROVIDER NOTE - PHYSICAL EXAMINATION
VITAL SIGNS: I have reviewed nursing notes and confirm.  CONSTITUTIONAL: Well-developed; well-nourished; in no acute distress.   SKIN:  warm and dry, no acute rash.   HEAD:  normocephalic, atraumatic.  EYES: EOM intact; conjunctiva and sclera clear.  ENT: No nasal discharge; airway clear.   NECK: Supple; non tender.  CARD: S1, S2 normal; no murmurs, gallops, or rubs. Regular rate and rhythm.   RESP:  Clear to auscultation b/l, no wheezes, rales or rhonchi.  ABD: Normal bowel sounds; soft; non-distended; + mild generalized tenderness; no guarding/ rebound.  RECTAL: Brown stool, no melena, + small amt of blood, though may be 2/2 blood along perineum.   PELVIC: very limited as pt will not abduct legs; + blood noted to finger upon insertion into vagina.   EXT: keeps legs flexed at hips and knees b/l.   NEURO: Awake, demented. Sensate to painful stimuli. Moves upper ext without difficulty. VITAL SIGNS: I have reviewed nursing notes and confirm.  CONSTITUTIONAL: Well-developed elderly f in no acute distress.   SKIN:  warm and dry, no acute rash.   HEAD:  normocephalic, atraumatic.  EYES: EOM intact; conjunctiva and sclera clear.  ENT: No nasal discharge; airway clear.   NECK: Supple; non tender.  CARD: S1, S2 normal; no murmurs, gallops, or rubs. Regular rate and rhythm.   RESP:  Clear to auscultation b/l, no wheezes, rales or rhonchi.  ABD: Normal bowel sounds; soft; non-distended; + mild generalized tenderness; no guarding/ rebound.  RECTAL: Brown stool, no melena, + small amt of blood, though may be 2/2 blood along perineum.   PELVIC: very limited as pt will not abduct legs; + dark red blood noted to finger upon insertion into vagina.   EXT: keeps legs flexed at hips and knees b/l.   NEURO: Awake, demented. Sensate to painful stimuli. Moves upper ext without difficulty.

## 2021-03-15 NOTE — H&P ADULT - PROBLEM SELECTOR PLAN 8
ADDENDUM: check ECHO, monitor fluid status No murmur auscultated on exam   -F/u TTE ordered No murmur auscultated on exam   -F/u TTE ordered      ADDENDUM:   #dementia: pt at baseline, will check b12/folate /TSH

## 2021-03-15 NOTE — H&P ADULT - PROBLEM SELECTOR PLAN 5
ADDENDUM: CAYLA on CKD, on d5 1/2 NS overnight , monitor renal function; check urine studies if not improving [IMPROVING]  Baseline creatinine appears to be about 1.4-1.5, creatinine on admission 1.69, c/w CAYLA. Likely pre-renal in nature due to dehydration on exam.   -S/p 2L NS bolus, and D5 1/2 NS x 24 hrs  -Continue to trend renal function. If not improving can obtain urine studies

## 2021-03-15 NOTE — H&P ADULT - PROBLEM SELECTOR PLAN 3
Na 146, repeat 148 on admission. Na 146, repeat 148 on admission. Likely 2/2 poor PO intake, mucous membranes dry on admission. S/p 2LNS bolus.   -Given uptrending sodium, started D5 50cc/hr x 10 hours   -Repeat BMP in AM  -Unknown EF, however patient has no history of heart failure per records Na 146, repeat 148 on admission. Likely 2/2 poor PO intake, mucous membranes dry on admission. S/p 2LNS bolus.   -Given up-trending sodium, started D5/ 1/2 NS (60cc/hr x 24 hours)   -Repeat BMP in AM  -Unknown EF, however patient has no history of heart failure per records ADDENDUM: see #1 ; follow up GYN recs ADDENDUM: see #1 ; follow up GYN recs  -Per Gyn, no indication for vaginal packing or transfusion at this time   -Maintain active type and screen

## 2021-03-15 NOTE — H&P ADULT - NSHPSOCIALHISTORY_GEN_ALL_CORE
Patient lives in [facility] Patient lives in [facility] nursing home  unable to obtain toxic habit hx from patient 2/2 dementia

## 2021-03-15 NOTE — ED ADULT TRIAGE NOTE - CHIEF COMPLAINT QUOTE
Pt brought in by EMS from [facility] for vaginal bleeding. Pt AOx1 at baseline. No verbal or non-verbal indicators of pain present. Pt brought in by EMS from [facility] for vaginal bleeding. On Eliquis. Pt AOx1 at baseline. No verbal or non-verbal indicators of pain present.

## 2021-03-15 NOTE — H&P ADULT - ASSESSMENT
89 y/o F with PMHx MR, dementia (AAOx1 at baseline), blind in both eyes, HTN, on Eliquis for DVT ppx (?) who presents from [facility] home for vaginal bleeding x 1 day. Imaging on admission c/f uterine and rectal carcinoma. Admitted for further management.  89 y/o F with PMHx MR, dementia (AAOx1 at baseline), glaucoma (blind in both eyes), HTN and CKD IIIb (baseline Cr 1.4-1.5) who presents from [facility] home for vaginal bleeding x 1 day. Imaging on admission c/f uterine and rectal carcinoma. Admitted for further management.

## 2021-03-15 NOTE — ED ADULT NURSE NOTE - CHIEF COMPLAINT QUOTE
Pt brought in by EMS from [facility] for vaginal bleeding. On Eliquis. Pt AOx1 at baseline. No verbal or non-verbal indicators of pain present.

## 2021-03-15 NOTE — ED PROVIDER NOTE - CLINICAL SUMMARY MEDICAL DECISION MAKING FREE TEXT BOX
Impression:: vaginal bleeding since this am, with mild generalized abd tenderness. Afebrile. HDS. Labs reviewed w/ findings of normal wbc, hg/hct, hypernatremia to 148, elev cr 1.6 (prior cr trended and usually elevated). CT a/p + for findings suggestive of uterine and rectal ca, no obstruction. Gyn and surgery consults obtained. Pt's niece contacted as well who would like pt to have full work up. No rectal blood noted on rectal exam as per surgery resident. Recommending medicine admission for further work up.

## 2021-03-15 NOTE — CONSULT NOTE ADULT - SUBJECTIVE AND OBJECTIVE BOX
Patient is a 89 yo female brought in by ambulance from her [facility] nursing home for vaginal bleeding since 8am. Patient has a hx dementia and is unable to give a history. Pt states she "hopes to feel well" but isn't able to clarify what is bothering her.   Limited HPI 2/2 dementia. Pt denies CP, SOB, palpitations, dizziness, weakness, abdominal pain, N/V/D, dysuria, vaginal bleeding.       OB/GYN Hx: Unable to obtain 2/2 dementia    Medical hx: Mitral regurgitation, dementia, HTN, glaucoma (from NH records)   Surgical hx: unable to obtain  Meds: Apixaban 2.5mg PO BID (VTE ppx), colace 100mg, megestrol acetate, metoprolol 25mg PO BID, tylenol 325mg PRN  Allergies: NKDA    Social hx: Lives in a NH    PHYSICAL EXAM:   Vital Signs Last 24 Hrs  T(C): 37.3 (15 Mar 2021 10:15), Max: 37.8 (15 Mar 2021 09:40)  T(F): 99.2 (15 Mar 2021 10:15), Max: 100 (15 Mar 2021 09:40)  HR: 71 (15 Mar 2021 09:40) (71 - 71)  BP: 151/85 (15 Mar 2021 09:40) (151/85 - 151/85)  BP(mean): --  RR: 18 (15 Mar 2021 09:40) (18 - 18)  SpO2: 100% (15 Mar 2021 09:40) (100% - 100%)    **************************  Constitutional: Alert & Oriented x0  Respiratory: Clear to ausculation bilaterally; no wheezing, rhonchi, or crackles  Cardiovascular: regular rate and rhythm, no murmurs, or gallops  Gastrointestinal: soft, mild diffuse tenderness, positive bowel sounds, no rebound or guarding   Pelvic exam:  Limited external exam as patient will not abduct legs. Patient wearing a pad/diaper so no blood noted on obdulio. Per ED attending, +blood noted on glove after inserting finger.   Extremities: Patient refused lower extremity exam. Keeps legs flexed at hips and knees bilaterally.     LABS:                        13.9   10.25 )-----------( 254      ( 15 Mar 2021 10:41 )             43.8     03-15    146<H>  |  113<H>  |  34<H>  ----------------------------<  113<H>  See Note   |  24  |  1.69<H>    Ca    10.6<H>      15 Mar 2021 10:41    TPro  8.3  /  Alb  3.6  /  TBili  0.6  /  DBili  x   /  AST  See Note  /  ALT  See Note  /  AlkPhos  53  03-15    PT/INR - ( 15 Mar 2021 11:39 )   PT: 17.9 sec;   INR: 1.52          PTT - ( 15 Mar 2021 11:39 )  PTT:25.7 sec  Urinalysis Basic - ( 15 Mar 2021 11:12 )    Color: Yellow / Appearance: Clear / S.025 / pH: x  Gluc: x / Ketone: NEGATIVE  / Bili: Negative / Urobili: 0.2 E.U./dL   Blood: x / Protein: NEGATIVE mg/dL / Nitrite: NEGATIVE   Leuk Esterase: NEGATIVE / RBC: x / WBC x   Sq Epi: x / Non Sq Epi: x / Bacteria: x

## 2021-03-15 NOTE — CONSULT NOTE ADULT - ASSESSMENT
Patient is a 89 year old female, brought in by ambulance from her nursing home after episode of vaginal bleeding at 8am. Patient is A&Ox0 and is not able to give a history/consent to pelvic exam 2/2 dementia. Pt hemodynamically stable, no acute distress.   - Unable to obtain OB/GYN history from nursing home records. Patient sent for CT A/P and will determine next steps based on results.   - Will re-evaluate patient with  following CT results.     Plan discussed with in House , Dr. Amara Bailey PA-C Patient is a 89 year old female, brought in by ambulance from her nursing home after episode of vaginal bleeding at 8am. Patient is A&Ox0 and is not able to give a history/consent to pelvic exam 2/2 dementia. Pt hemodynamically stable, no acute distress. Unable to obtain OB/GYN history from nursing home records.   - Will re-evaluate patient with  following CT results.     Plan discussed with in House , Dr. Amara Bailey PA-C

## 2021-03-15 NOTE — ED PROVIDER NOTE - CARE PLAN
Principal Discharge DX:	Vaginal bleeding  Secondary Diagnosis:	Malignant neoplasm of uterus, unspecified site  Secondary Diagnosis:	Rectal cancer  Secondary Diagnosis:	Hypernatremia

## 2021-03-15 NOTE — CONSULT NOTE ADULT - SUBJECTIVE AND OBJECTIVE BOX
*Most of information were obtained from chart and ED physician, as patient is unable to give history*    This is a 89 y/o F with PMH of dementia, MR, shingles and Glaucoma, PSH eye surgery, was brought by ambulance from [facility] for vaginal bleeding since this am, no other history was given, contacted the Niece Ms Tania Kohler (contact info on chart) who said the patient has been having this bleeding for more than 2 months and she told the [facility] center to bring her to the hospital, according to the Niece the patient wants to live as much as possible.  Unclear if the patient had any recent weight loss or changes since the Niece was not able to see her for a long time.  The patient is on Eliquis (unknown reason)    On arrival to ED patient is stable had low grade fever 100 improved to 99.2, rest of vitals are stable  WBC and Hgb are normal, Cr is 1.6 (had an CAYLA last admission on 2020 according to the chart), Lactate 2.8    CT scan showed  "gastrointestinal tract demonstrates severe circumferential masslike mural thickening of the rectum with a discrete solid mass on the right rectal wall measuring 4.7 x 5.3 cm. There is normal appearance of the appendix. 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 There is significant distention of the uterus with blood with internal enhancing masslike material spanning 5.0 x 5.9 cm. The bladder is unremarkable. No free fluid. No adenopathy. Moderate to severe vascular calcification. Small hiatal hernia. Opacified aspects of the portal, splenic, superior mesenteric vein, bilateral renal veins, IVC and bilateral iliac veins demonstrates no maria elena intraluminal thrombus."        PAST MEDICAL & SURGICAL HISTORY:  Mitral regurgitation    Glaucoma    Shingles    H/O eye surgery          Allergies    No Known Allergies    Intolerances        SOCIAL HISTORY:    FAMILY HISTORY:  FH: arthritis  sister, grandmother        REVIEW OF SYSTEMS      Not possible due to dementia.      Vital Signs Last 24 Hrs  T(C): 37.2 (15 Mar 2021 16:21), Max: 37.8 (15 Mar 2021 09:40)  T(F): 98.9 (15 Mar 2021 16:21), Max: 100 (15 Mar 2021 09:40)  HR: 63 (15 Mar 2021 16:21) (63 - 71)  BP: 132/62 (15 Mar 2021 16:21) (132/62 - 151/85)  BP(mean): --  RR: 18 (15 Mar 2021 16:21) (18 - 18)  SpO2: 100% (15 Mar 2021 16:21) (100% - 100%)          Physical Exam:  General: NAD, resting comfortably, pleasant, verbal  HEENT: NC/AT, EOMI, normal hearing, no oral lesions, no LAD, neck supple  Pulmonary: normal resp effort, CTA-B  Cardiovascular: NSR, no murmurs  Abdominal: soft, ND/NT, no organomegaly  Rectal exam: some blood on diaper, possibly from vaginal bleed, stool was palpated, no masses, no blood on gloved finger  Extremities: WWP, normal strength, no clubbing/cyanosis/edema  Neuro: A/O x 3, CNs II-XII grossly intact, normal sensation, no focal deficits  Pulses: palpable distal pulses        LABS:                        13.9   10.25 )-----------( 254      ( 15 Mar 2021 10:41 )             43.8     03-15    148<H>  |  118<H>  |  33<H>  ----------------------------<  97  4.5   |  20<L>  |  1.61<H>    Ca    9.6      15 Mar 2021 11:58    TPro  7.3  /  Alb  3.2<L>  /  TBili  0.5  /  DBili  x   /  AST  18  /  ALT  12  /  AlkPhos  55  03-15    PT/INR - ( 15 Mar 2021 11:39 )   PT: 17.9 sec;   INR: 1.52          PTT - ( 15 Mar 2021 11:39 )  PTT:25.7 sec  Urinalysis Basic - ( 15 Mar 2021 11:12 )    Color: Yellow / Appearance: Clear / S.025 / pH: x  Gluc: x / Ketone: NEGATIVE  / Bili: Negative / Urobili: 0.2 E.U./dL   Blood: x / Protein: NEGATIVE mg/dL / Nitrite: NEGATIVE   Leuk Esterase: NEGATIVE / RBC: x / WBC x   Sq Epi: x / Non Sq Epi: x / Bacteria: x      CAPILLARY BLOOD GLUCOSE        LIVER FUNCTIONS - ( 15 Mar 2021 11:58 )  Alb: 3.2 g/dL / Pro: 7.3 g/dL / ALK PHOS: 55 U/L / ALT: 12 U/L / AST: 18 U/L / GGT: x             Cultures:      RADIOLOGY & ADDITIONAL STUDIES:      < from: CT Abdomen and Pelvis w/ Oral Cont and w/ IV Cont (03.15.21 @ 13:35) >  FINDINGS: CT of the abdomen and pelvis was performed with the administration of contrast. Reconstructions were performed in the sagittal planes. No prior study available for comparison.    Evaluation of the lower chest demonstrates normal heart size. There is no pleural and no pericardial effusion. There is reticulation of the partially visualized lower lung parenchyma. Evaluation of the abdomen demonstrates that the liver, spleen, pancreas, gallbladder, bilateral adrenal glands and bilateral kidneys are normal in appearance aside from afew left renal cysts, nonspecific nodular adrenal nodularity and shrunken spleen. Evaluation of the gastrointestinal tract demonstrates severe circumferential masslike mural thickening of the rectum with a discrete solid mass on the right rectal wallmeasuring 4.7 x 5.3 cm. There is normal appearance of the appendix. 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 There is significant distention of the uterus with blood with internal enhancing masslike material spanning 5.0 x 5.9 cm. The bladder is unremarkable. No free fluid. No adenopathy. Moderate to severe vascular calcification. Small hiatal hernia. Opacified aspects of the portal, splenic, superior mesenteric vein, bilateral renal veins, IVC and bilateral iliac veins demonstrates no maria elena intraluminal thrombus. Osseous structures are unremarkable.          IMPRESSION:    Uterine carcinoma    Rectal carcinoma.    < end of copied text >       *Most of information were obtained from chart and ED physician, as patient is unable to give history*    This is a 89 y/o F with PMH of dementia, MR, shingles and Glaucoma, PSH eye surgery, was brought by ambulance from [facility] for vaginal bleeding since this am, no other history was given, contacted the Niece Ms Tania Kohler (contact info on chart) who said the patient has been having this bleeding for more than 2 months and she told the [facility] center to bring her to the hospital, according to the Niece the patient wants to live as much as possible.  Unclear if the patient had any recent weight loss or changes since the Niece was not able to see her for a long time.  Pt denies CP, SOB, palpitations, dizziness, weakness, abdominal pain, N/V/D, dysuria, vaginal bleeding.     The patient is on Eliquis (unknown reason)    On arrival to ED patient is stable had low grade fever 100 improved to 99.2, rest of vitals are stable  WBC and Hgb are normal, Cr is 1.6 (had an CAYLA last admission on 2020 according to the chart), Lactate 2.8    CT scan showed  "gastrointestinal tract demonstrates severe circumferential masslike mural thickening of the rectum with a discrete solid mass on the right rectal wall measuring 4.7 x 5.3 cm. There is normal appearance of the appendix. 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 There is significant distention of the uterus with blood with internal enhancing masslike material spanning 5.0 x 5.9 cm. The bladder is unremarkable. No free fluid. No adenopathy. Moderate to severe vascular calcification. Small hiatal hernia. Opacified aspects of the portal, splenic, superior mesenteric vein, bilateral renal veins, IVC and bilateral iliac veins demonstrates no maria elena intraluminal thrombus."        PAST MEDICAL & SURGICAL HISTORY:  Mitral regurgitation    Glaucoma    Shingles    H/O eye surgery          Allergies    No Known Allergies    Intolerances        SOCIAL HISTORY:    FAMILY HISTORY:  FH: arthritis  sister, grandmother        REVIEW OF SYSTEMS      Not possible due to dementia.      Vital Signs Last 24 Hrs  T(C): 37.2 (15 Mar 2021 16:21), Max: 37.8 (15 Mar 2021 09:40)  T(F): 98.9 (15 Mar 2021 16:21), Max: 100 (15 Mar 2021 09:40)  HR: 63 (15 Mar 2021 16:21) (63 - 71)  BP: 132/62 (15 Mar 2021 16:21) (132/62 - 151/85)  BP(mean): --  RR: 18 (15 Mar 2021 16:21) (18 - 18)  SpO2: 100% (15 Mar 2021 16:21) (100% - 100%)          Physical Exam:  General: NAD, resting comfortably, pleasant, verbal  HEENT: NC/AT, EOMI, normal hearing, no oral lesions, no LAD, neck supple  Pulmonary: normal resp effort, CTA-B  Cardiovascular: NSR, no murmurs  Abdominal: soft, ND/NT, no organomegaly  Rectal exam: some blood on diaper, possibly from vaginal bleed, stool was palpated, no masses, no blood on gloved finger  Extremities: WWP, normal strength, no clubbing/cyanosis/edema  Neuro: A/O x 3, CNs II-XII grossly intact, normal sensation, no focal deficits  Pulses: palpable distal pulses        LABS:                        13.9   10.25 )-----------( 254      ( 15 Mar 2021 10:41 )             43.8     03-15    148<H>  |  118<H>  |  33<H>  ----------------------------<  97  4.5   |  20<L>  |  1.61<H>    Ca    9.6      15 Mar 2021 11:58    TPro  7.3  /  Alb  3.2<L>  /  TBili  0.5  /  DBili  x   /  AST  18  /  ALT  12  /  AlkPhos  55  03-15    PT/INR - ( 15 Mar 2021 11:39 )   PT: 17.9 sec;   INR: 1.52          PTT - ( 15 Mar 2021 11:39 )  PTT:25.7 sec  Urinalysis Basic - ( 15 Mar 2021 11:12 )    Color: Yellow / Appearance: Clear / S.025 / pH: x  Gluc: x / Ketone: NEGATIVE  / Bili: Negative / Urobili: 0.2 E.U./dL   Blood: x / Protein: NEGATIVE mg/dL / Nitrite: NEGATIVE   Leuk Esterase: NEGATIVE / RBC: x / WBC x   Sq Epi: x / Non Sq Epi: x / Bacteria: x      CAPILLARY BLOOD GLUCOSE        LIVER FUNCTIONS - ( 15 Mar 2021 11:58 )  Alb: 3.2 g/dL / Pro: 7.3 g/dL / ALK PHOS: 55 U/L / ALT: 12 U/L / AST: 18 U/L / GGT: x             Cultures:      RADIOLOGY & ADDITIONAL STUDIES:      < from: CT Abdomen and Pelvis w/ Oral Cont and w/ IV Cont (03.15.21 @ 13:35) >  FINDINGS: CT of the abdomen and pelvis was performed with the administration of contrast. Reconstructions were performed in the sagittal planes. No prior study available for comparison.    Evaluation of the lower chest demonstrates normal heart size. There is no pleural and no pericardial effusion. There is reticulation of the partially visualized lower lung parenchyma. Evaluation of the abdomen demonstrates that the liver, spleen, pancreas, gallbladder, bilateral adrenal glands and bilateral kidneys are normal in appearance aside from afew left renal cysts, nonspecific nodular adrenal nodularity and shrunken spleen. Evaluation of the gastrointestinal tract demonstrates severe circumferential masslike mural thickening of the rectum with a discrete solid mass on the right rectal wallmeasuring 4.7 x 5.3 cm. There is normal appearance of the appendix. 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 There is significant distention of the uterus with blood with internal enhancing masslike material spanning 5.0 x 5.9 cm. The bladder is unremarkable. No free fluid. No adenopathy. Moderate to severe vascular calcification. Small hiatal hernia. Opacified aspects of the portal, splenic, superior mesenteric vein, bilateral renal veins, IVC and bilateral iliac veins demonstrates no maria elena intraluminal thrombus. Osseous structures are unremarkable.          IMPRESSION:    Uterine carcinoma    Rectal carcinoma.    < end of copied text >       *Most of information were obtained from chart and ED physician, as patient is unable to give history*    This is a 89 y/o F with PMH of dementia, MR, shingles and Glaucoma, PSH eye surgery, [facility] since this am, no other history was given, contacted the Niece Ms Tania Kohler (contact info on chart) who said the patient has been having this bleeding for more than 2 months and she told the rehab center to bring her to the hospital, according to the Niece " the patient wants to live as much as possible".  Unclear if the patient had any recent weight loss or changes since the Niece was not able to see her for a long time.  Pt denies CP, SOB, palpitations, dizziness, weakness, abdominal pain, N/V/D, dysuria, vaginal bleeding.     The patient is on Eliquis (unknown reason)    On arrival to ED patient is stable had low grade fever 100 improved to 99.2, rest of vitals are stable  WBC and Hgb are normal, Cr is 1.6 (had an CAYLA last admission on 2020 according to the chart), Lactate 2.8    CT scan showed  "gastrointestinal tract demonstrates severe circumferential masslike mural thickening of the rectum with a discrete solid mass on the right rectal wall measuring 4.7 x 5.3 cm. There is normal appearance of the appendix. 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 There is significant distention of the uterus with blood with internal enhancing masslike material spanning 5.0 x 5.9 cm. The bladder is unremarkable. No free fluid. No adenopathy. Moderate to severe vascular calcification. Small hiatal hernia. Opacified aspects of the portal, splenic, superior mesenteric vein, bilateral renal veins, IVC and bilateral iliac veins demonstrates no maria elena intraluminal thrombus."        PAST MEDICAL & SURGICAL HISTORY:  Mitral regurgitation    Glaucoma    Shingles    H/O eye surgery          Allergies    No Known Allergies    Intolerances        SOCIAL HISTORY:    FAMILY HISTORY:  FH: arthritis  sister, grandmother        REVIEW OF SYSTEMS      Not possible due to dementia.      Vital Signs Last 24 Hrs  T(C): 37.2 (15 Mar 2021 16:21), Max: 37.8 (15 Mar 2021 09:40)  T(F): 98.9 (15 Mar 2021 16:21), Max: 100 (15 Mar 2021 09:40)  HR: 63 (15 Mar 2021 16:21) (63 - 71)  BP: 132/62 (15 Mar 2021 16:21) (132/62 - 151/85)  BP(mean): --  RR: 18 (15 Mar 2021 16:21) (18 - 18)  SpO2: 100% (15 Mar 2021 16:21) (100% - 100%)          Physical Exam:  General: NAD, resting comfortably, pleasant, verbal, forgetful  HEENT: NC/AT, EOMI, normal hearing, no oral lesions, no LAD, neck supple  Pulmonary: normal resp effort, CTA-B  Cardiovascular: NSR, no murmurs  Abdominal: soft, ND/NT, no organomegaly  Rectal exam: some blood on diaper, possibly from vaginal bleed, stool was palpated, no masses, no blood on gloved finger  Extremities: WWP, normal strength, no clubbing/cyanosis/edema  Neuro: A/O x 3, CNs II-XII grossly intact, normal sensation, no focal deficits  Pulses: palpable distal pulses        LABS:                        13.9   10.25 )-----------( 254      ( 15 Mar 2021 10:41 )             43.8     03-15    148<H>  |  118<H>  |  33<H>  ----------------------------<  97  4.5   |  20<L>  |  1.61<H>    Ca    9.6      15 Mar 2021 11:58    TPro  7.3  /  Alb  3.2<L>  /  TBili  0.5  /  DBili  x   /  AST  18  /  ALT  12  /  AlkPhos  55  03-15    PT/INR - ( 15 Mar 2021 11:39 )   PT: 17.9 sec;   INR: 1.52          PTT - ( 15 Mar 2021 11:39 )  PTT:25.7 sec  Urinalysis Basic - ( 15 Mar 2021 11:12 )    Color: Yellow / Appearance: Clear / S.025 / pH: x  Gluc: x / Ketone: NEGATIVE  / Bili: Negative / Urobili: 0.2 E.U./dL   Blood: x / Protein: NEGATIVE mg/dL / Nitrite: NEGATIVE   Leuk Esterase: NEGATIVE / RBC: x / WBC x   Sq Epi: x / Non Sq Epi: x / Bacteria: x      CAPILLARY BLOOD GLUCOSE        LIVER FUNCTIONS - ( 15 Mar 2021 11:58 )  Alb: 3.2 g/dL / Pro: 7.3 g/dL / ALK PHOS: 55 U/L / ALT: 12 U/L / AST: 18 U/L / GGT: x             Cultures:      RADIOLOGY & ADDITIONAL STUDIES:      < from: CT Abdomen and Pelvis w/ Oral Cont and w/ IV Cont (03.15.21 @ 13:35) >  FINDINGS: CT of the abdomen and pelvis was performed with the administration of contrast. Reconstructions were performed in the sagittal planes. No prior study available for comparison.    Evaluation of the lower chest demonstrates normal heart size. There is no pleural and no pericardial effusion. There is reticulation of the partially visualized lower lung parenchyma. Evaluation of the abdomen demonstrates that the liver, spleen, pancreas, gallbladder, bilateral adrenal glands and bilateral kidneys are normal in appearance aside from afew left renal cysts, nonspecific nodular adrenal nodularity and shrunken spleen. Evaluation of the gastrointestinal tract demonstrates severe circumferential masslike mural thickening of the rectum with a discrete solid mass on the right rectal wallmeasuring 4.7 x 5.3 cm. There is normal appearance of the appendix. 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 There is significant distention of the uterus with blood with internal enhancing masslike material spanning 5.0 x 5.9 cm. The bladder is unremarkable. No free fluid. No adenopathy. Moderate to severe vascular calcification. Small hiatal hernia. Opacified aspects of the portal, splenic, superior mesenteric vein, bilateral renal veins, IVC and bilateral iliac veins demonstrates no maria elena intraluminal thrombus. Osseous structures are unremarkable.          IMPRESSION:    Uterine carcinoma    Rectal carcinoma.    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  Unclear if the patient had any recent weight loss or changes since the Niece was not able to see her for a long time.  Pt denies CP, SOB, palpitations, dizziness, weakness, abdominal pain, N/V/D, dysuria, vaginal bleeding.     The patient is on Eliquis (unknown reason)    On arrival to ED patient is stable had low grade fever 100 improved to 99.2, rest of vitals are stable  WBC and Hgb are normal, Cr is 1.6 (had an CAYLA last admission on 2020 according to the chart), Lactate 2.8    CT scan showed  "gastrointestinal tract demonstrates severe circumferential masslike mural thickening of the rectum with a discrete solid mass on the right rectal wall measuring 4.7 x 5.3 cm. There is normal appearance of the appendix. 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 There is significant distention of the uterus with blood with internal enhancing masslike material spanning 5.0 x 5.9 cm. The bladder is unremarkable. No free fluid. No adenopathy. Moderate to severe vascular calcification. Small hiatal hernia. Opacified aspects of the portal, splenic, superior mesenteric vein, bilateral renal veins, IVC and bilateral iliac veins demonstrates no maria elena intraluminal thrombus."        PAST MEDICAL & SURGICAL HISTORY:  Mitral regurgitation    Glaucoma    Shingles    H/O eye surgery          Allergies    No Known Allergies    Intolerances        SOCIAL HISTORY:    FAMILY HISTORY:  FH: arthritis  sister, grandmother        REVIEW OF SYSTEMS      Not possible due to dementia.      Vital Signs Last 24 Hrs  T(C): 37.2 (15 Mar 2021 16:21), Max: 37.8 (15 Mar 2021 09:40)  T(F): 98.9 (15 Mar 2021 16:21), Max: 100 (15 Mar 2021 09:40)  HR: 63 (15 Mar 2021 16:21) (63 - 71)  BP: 132/62 (15 Mar 2021 16:21) (132/62 - 151/85)  BP(mean): --  RR: 18 (15 Mar 2021 16:21) (18 - 18)  SpO2: 100% (15 Mar 2021 16:21) (100% - 100%)          Physical Exam:  General: NAD, resting comfortably, pleasant, verbal, forgetful  HEENT: NC/AT, EOMI, normal hearing, no oral lesions, no LAD, neck supple  Pulmonary: normal resp effort, CTA-B  Cardiovascular: NSR, no murmurs  Abdominal: soft, ND/NT, no organomegaly  Rectal exam: some blood on diaper, possibly from vaginal bleed, stool was palpated, no masses, no blood on gloved finger  Extremities: WWP, normal strength, no clubbing/cyanosis/edema  Neuro: A/O x 1,   Pulses: palpable distal pulses        LABS:                        13.9   10.25 )-----------( 254      ( 15 Mar 2021 10:41 )             43.8     03-15    148<H>  |  118<H>  |  33<H>  ----------------------------<  97  4.5   |  20<L>  |  1.61<H>    Ca    9.6      15 Mar 2021 11:58    TPro  7.3  /  Alb  3.2<L>  /  TBili  0.5  /  DBili  x   /  AST  18  /  ALT  12  /  AlkPhos  55  03-15    PT/INR - ( 15 Mar 2021 11:39 )   PT: 17.9 sec;   INR: 1.52          PTT - ( 15 Mar 2021 11:39 )  PTT:25.7 sec  Urinalysis Basic - ( 15 Mar 2021 11:12 )    Color: Yellow / Appearance: Clear / S.025 / pH: x  Gluc: x / Ketone: NEGATIVE  / Bili: Negative / Urobili: 0.2 E.U./dL   Blood: x / Protein: NEGATIVE mg/dL / Nitrite: NEGATIVE   Leuk Esterase: NEGATIVE / RBC: x / WBC x   Sq Epi: x / Non Sq Epi: x / Bacteria: x      CAPILLARY BLOOD GLUCOSE        LIVER FUNCTIONS - ( 15 Mar 2021 11:58 )  Alb: 3.2 g/dL / Pro: 7.3 g/dL / ALK PHOS: 55 U/L / ALT: 12 U/L / AST: 18 U/L / GGT: x             Cultures:      RADIOLOGY & ADDITIONAL STUDIES:      < from: CT Abdomen and Pelvis w/ Oral Cont and w/ IV Cont (03.15.21 @ 13:35) >  FINDINGS: CT of the abdomen and pelvis was performed with the administration of contrast. Reconstructions were performed in the sagittal planes. No prior study available for comparison.    Evaluation of the lower chest demonstrates normal heart size. There is no pleural and no pericardial effusion. There is reticulation of the partially visualized lower lung parenchyma. Evaluation of the abdomen demonstrates that the liver, spleen, pancreas, gallbladder, bilateral adrenal glands and bilateral kidneys are normal in appearance aside from afew left renal cysts, nonspecific nodular adrenal nodularity and shrunken spleen. Evaluation of the gastrointestinal tract demonstrates severe circumferential masslike mural thickening of the rectum with a discrete solid mass on the right rectal wallmeasuring 4.7 x 5.3 cm. There is normal appearance of the appendix. 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 There is significant distention of the uterus with blood with internal enhancing masslike material spanning 5.0 x 5.9 cm. The bladder is unremarkable. No free fluid. No adenopathy. Moderate to severe vascular calcification. Small hiatal hernia. Opacified aspects of the portal, splenic, superior mesenteric vein, bilateral renal veins, IVC and bilateral iliac veins demonstrates no maria elena intraluminal thrombus. Osseous structures are unremarkable.          IMPRESSION:    Uterine carcinoma    Rectal carcinoma.    < end of copied text >

## 2021-03-15 NOTE — ED ADULT NURSE NOTE - DISTAL EXTREMITY CAPILLARY REFILL
HR showing 136 on pulse ox Pt had one episode of diarrhea Pt temp elevated, O2 decreased with 3L NC pt temp 102.8 pt with an elevated temp Oral temperature of 96.7 deg F 2 seconds or less

## 2021-03-15 NOTE — H&P ADULT - PROBLEM SELECTOR PLAN 7
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 Is on eye drops   -Continue combigan and latanoprost inpatient

## 2021-03-15 NOTE — ED PROVIDER NOTE - OBJECTIVE STATEMENT
Pt is a 89yo f, h/o MR, dementia, HTN, glaucoma, biba from [facility] for vaginal bleeding. Pt is denying any complaints and stating "I feel fine". No cp, sob, palp, dizziness, weakness, abd pain, n/v/d, dysuria... Limited HPI 2/2 dementia.

## 2021-03-15 NOTE — H&P ADULT - PROBLEM SELECTOR PLAN 6
Continue amlodipine 5mg    ADDENDUM: hold amlodipine in setting of chronic bleeding issue, restart prn Hold home amlodipine in setting of chronic bleeding issue, restart prn    #DVT PPx with Eliquis (?)  Per notes, patient may be on DVT PPx with Eliquis 2.5mg BID, however the medication is not on the patient's pharmacy list, and the niece denies that her aunt is on any anticoagulants. Additionally, US Duplex from last admission in 2020 shows US Duplex negative for any DVT.

## 2021-03-15 NOTE — H&P ADULT - PROBLEM SELECTOR PLAN 1
Patient with vaginal bleeding x 1 day, hemodynamically stable. On exam, patient noted to be bleeding at vaginal introitus, however DHIRAJ negative. Imaging concerning for uterine and rectal carcinoma. Unclear history regarding history of cancer, symptoms including weight loss, fatigue etc. 2/2 patient's dementia.   -Gynecology evaluated patient in ED - no indication for transfusion or vaginal packing at this time. Gyn onc will follow if there is tissue diagnosis proving malignancy. Benign gyn will continue to follow and attempt pelvic exam once patient amenable.   -Per conversation with patient's niece, patient will have full work up and is full code   -Obtain more collateral from family regarding hx and GOC   -F/u surgery recs, awaiting note Patient with vaginal bleeding x 1 day, hemodynamically stable. On exam, patient noted to be bleeding at vaginal introitus, however DHIRAJ negative. Imaging concerning for uterine and rectal carcinoma. Unclear history regarding history of cancer, symptoms including weight loss, fatigue etc. 2/2 patient's dementia.   -Gynecology evaluated patient in ED - no indication for transfusion or vaginal packing at this time. Gyn onc will follow if there is tissue diagnosis proving malignancy. Benign gyn will continue to follow and attempt pelvic exam once patient amenable.   -Per conversation with patient's niece, patient will have full work up and is full code   -See "Goals of care" below for more details regarding GOC discussion.   -Surgery consulted in ED - no acute surgical intervention at this time, recommend colonoscopy   -Consider GI consult in AM for colonoscopy for biopsy of rectal mass  -Palliative care consulted, f/u recs   -F/u tumor markers (CEA, CA-125, etc.)

## 2021-03-15 NOTE — H&P ADULT - PROBLEM SELECTOR PLAN 10
F: on d5 1/2 NS overnight x 24 hrs. S/p 2L NS bolus   E: monitor and replete  N: NPO after midnight pending possible biopsy  GI: none  DVT: SCDs bilaterally (questionable hx of DVT ppx with Eliquis 2.5 BID, however medication not present on med rec, and niece does not recall)     Code Status: Full Code   Dispo:  Mike

## 2021-03-15 NOTE — H&P ADULT - PROBLEM SELECTOR PLAN 9
F: on d5 1/2 NS overnight  E: monitor and replete  N: NPO after midnight pending possible biopsy    VTE PPX: holding lovenox in setting of bleeding, possible biopsy    code status: full code per niece Spoke with niece, Ms. Tania Kohler on admission. Tania is the only living kin of patient, and therefore has been making all of her medical decisions for the past 5-6 years, despite not officially signing any form to be her HCP. Per discussion with Tania, the patient has no children and expressed to Tania recently that she wants to "live as long as possible".   -Risks and benefits of pursuing non-operative vs operative treatments for likely uterine and colorectal malignancy were outlined with Tania - she understands that her aunt may not be a surgical candidate and that she may want to simply rest and "let the cancer take over". However at this time, she wishes for her aunt to remain full code, for all extraordinary measures to be taken to keep her aunt alive, and to obtain tissue diagnosis of the mass so they can consider future treatment options.   -Tania would like to pursue colonoscopy for possible biopsy   -States that she may reconsider goals of care/code status depending on prognosis  -F/u palliative care consult

## 2021-03-15 NOTE — CONSULT NOTE ADULT - ASSESSMENT
Patient is a 89 year old female, dementia, MR, shingles and Glaucoma,  brought in by ambulance from her nursing home after episode of vaginal bleeding at 8am.  CT scan showed possible uterine and rectal Ca    Unreliable history because of dementia  Talked to Niece over the phone said the bleeding has been there for 2 months and she repeatedly told the [facility] to take her to ED.    Vitals are stable, rectal exam, no blood on glove no masses palpated    Niece saying the patient wishes to live for long    Hgb 13.9  CT scan as above    Discussed with attending and chief resident:    - Discuss with family again regarding GOC  - Recommend medicine admit  - Work up for both tumors, tumor markers, colonoscopy and other markers  - pending GYN final recs  - No acute surgical intervention is warranted as of now  - Surgery team 4C will continue to FU

## 2021-03-15 NOTE — H&P ADULT - PROBLEM SELECTOR PLAN 4
Continue amlodipine 5mg Na 146, repeat 148 on admission. Likely 2/2 poor PO intake, mucous membranes dry on admission. S/p 2LNS bolus.   -Given up-trending sodium, started D5/ 1/2 NS (60cc/hr x 24 hours)   -Repeat BMP in AM  -Unknown EF, however patient has no history of heart failure per records Na 146, repeat 148 on admission. Likely 2/2 poor PO intake, mucous membranes dry on admission. S/p 2LNS bolus.   -Given up-trending sodium, started D5/ 1/2 NS (60cc/hr x 24 hours)   -Repeat BMP at 10 PM with improvement in Sodium to 142

## 2021-03-16 DIAGNOSIS — Z51.5 ENCOUNTER FOR PALLIATIVE CARE: ICD-10-CM

## 2021-03-16 DIAGNOSIS — C20 MALIGNANT NEOPLASM OF RECTUM: ICD-10-CM

## 2021-03-16 DIAGNOSIS — R53.2 FUNCTIONAL QUADRIPLEGIA: ICD-10-CM

## 2021-03-16 DIAGNOSIS — G89.3 NEOPLASM RELATED PAIN (ACUTE) (CHRONIC): ICD-10-CM

## 2021-03-16 DIAGNOSIS — Z78.9 OTHER SPECIFIED HEALTH STATUS: ICD-10-CM

## 2021-03-16 LAB
ALBUMIN SERPL ELPH-MCNC: 3.1 G/DL — LOW (ref 3.3–5)
ALP SERPL-CCNC: 55 U/L — SIGNIFICANT CHANGE UP (ref 40–120)
ALT FLD-CCNC: 11 U/L — SIGNIFICANT CHANGE UP (ref 10–45)
ANION GAP SERPL CALC-SCNC: 11 MMOL/L — SIGNIFICANT CHANGE UP (ref 5–17)
APTT BLD: 30.1 SEC — SIGNIFICANT CHANGE UP (ref 27.5–35.5)
AST SERPL-CCNC: 17 U/L — SIGNIFICANT CHANGE UP (ref 10–40)
BCA 255 TISS QL IMSTN: 24 U/ML — SIGNIFICANT CHANGE UP
BILIRUB SERPL-MCNC: 0.8 MG/DL — SIGNIFICANT CHANGE UP (ref 0.2–1.2)
BUN SERPL-MCNC: 24 MG/DL — HIGH (ref 7–23)
CALCIUM SERPL-MCNC: 9.9 MG/DL — SIGNIFICANT CHANGE UP (ref 8.4–10.5)
CANCER AG125 SERPL-ACNC: 13 U/ML — SIGNIFICANT CHANGE UP
CANCER AG19-9 SERPL-ACNC: 17 U/ML — SIGNIFICANT CHANGE UP
CEA SERPL-MCNC: 6.3 NG/ML — HIGH (ref 0–3.8)
CHLORIDE SERPL-SCNC: 111 MMOL/L — HIGH (ref 96–108)
CO2 SERPL-SCNC: 18 MMOL/L — LOW (ref 22–31)
CREAT SERPL-MCNC: 1.44 MG/DL — HIGH (ref 0.5–1.3)
FOLATE SERPL-MCNC: 15.9 NG/ML — SIGNIFICANT CHANGE UP
GLUCOSE SERPL-MCNC: 71 MG/DL — SIGNIFICANT CHANGE UP (ref 70–99)
HCT VFR BLD CALC: 38.8 % — SIGNIFICANT CHANGE UP (ref 34.5–45)
HGB BLD-MCNC: 12.4 G/DL — SIGNIFICANT CHANGE UP (ref 11.5–15.5)
INR BLD: 1.29 — HIGH (ref 0.88–1.16)
MAGNESIUM SERPL-MCNC: 1.9 MG/DL — SIGNIFICANT CHANGE UP (ref 1.6–2.6)
MCHC RBC-ENTMCNC: 30.7 PG — SIGNIFICANT CHANGE UP (ref 27–34)
MCHC RBC-ENTMCNC: 32 GM/DL — SIGNIFICANT CHANGE UP (ref 32–36)
MCV RBC AUTO: 96 FL — SIGNIFICANT CHANGE UP (ref 80–100)
NRBC # BLD: 0 /100 WBCS — SIGNIFICANT CHANGE UP (ref 0–0)
PLATELET # BLD AUTO: 242 K/UL — SIGNIFICANT CHANGE UP (ref 150–400)
POTASSIUM SERPL-MCNC: 4.9 MMOL/L — SIGNIFICANT CHANGE UP (ref 3.5–5.3)
POTASSIUM SERPL-SCNC: 4.9 MMOL/L — SIGNIFICANT CHANGE UP (ref 3.5–5.3)
PROLACTIN SERPL-MCNC: 35.7 NG/ML — HIGH (ref 3.4–24.1)
PROT SERPL-MCNC: 7.2 G/DL — SIGNIFICANT CHANGE UP (ref 6–8.3)
PROTHROM AB SERPL-ACNC: 15.3 SEC — HIGH (ref 10.6–13.6)
RBC # BLD: 4.04 M/UL — SIGNIFICANT CHANGE UP (ref 3.8–5.2)
RBC # FLD: 13.7 % — SIGNIFICANT CHANGE UP (ref 10.3–14.5)
SODIUM SERPL-SCNC: 140 MMOL/L — SIGNIFICANT CHANGE UP (ref 135–145)
TSH SERPL-MCNC: 0.35 UIU/ML — SIGNIFICANT CHANGE UP (ref 0.35–4.94)
VIT B12 SERPL-MCNC: 783 PG/ML — SIGNIFICANT CHANGE UP (ref 232–1245)
WBC # BLD: 10.9 K/UL — HIGH (ref 3.8–10.5)
WBC # FLD AUTO: 10.9 K/UL — HIGH (ref 3.8–10.5)

## 2021-03-16 PROCEDURE — 99223 1ST HOSP IP/OBS HIGH 75: CPT

## 2021-03-16 PROCEDURE — 99233 SBSQ HOSP IP/OBS HIGH 50: CPT | Mod: GC

## 2021-03-16 RX ORDER — METOPROLOL TARTRATE 50 MG
1 TABLET ORAL
Qty: 0 | Refills: 0 | DISCHARGE

## 2021-03-16 RX ORDER — SENNA PLUS 8.6 MG/1
2 TABLET ORAL EVERY 24 HOURS
Refills: 0 | Status: DISCONTINUED | OUTPATIENT
Start: 2021-03-16 | End: 2021-03-23

## 2021-03-16 RX ORDER — MEGESTROL ACETATE 40 MG/ML
10 SUSPENSION ORAL
Qty: 0 | Refills: 0 | DISCHARGE

## 2021-03-16 RX ORDER — ACETAMINOPHEN 500 MG
325 TABLET ORAL EVERY 6 HOURS
Refills: 0 | Status: DISCONTINUED | OUTPATIENT
Start: 2021-03-16 | End: 2021-03-23

## 2021-03-16 RX ORDER — LATANOPROST 0.05 MG/ML
1 SOLUTION/ DROPS OPHTHALMIC; TOPICAL
Qty: 0 | Refills: 0 | DISCHARGE

## 2021-03-16 RX ORDER — MEGESTROL ACETATE 40 MG/ML
400 SUSPENSION ORAL EVERY 24 HOURS
Refills: 0 | Status: DISCONTINUED | OUTPATIENT
Start: 2021-03-16 | End: 2021-03-23

## 2021-03-16 RX ORDER — DOCUSATE SODIUM 100 MG
1 CAPSULE ORAL
Qty: 0 | Refills: 0 | DISCHARGE

## 2021-03-16 RX ORDER — AMLODIPINE BESYLATE 2.5 MG/1
1 TABLET ORAL
Qty: 0 | Refills: 0 | DISCHARGE

## 2021-03-16 RX ORDER — BRIMONIDINE TARTRATE, TIMOLOL MALEATE 2; 5 MG/ML; MG/ML
1 SOLUTION/ DROPS OPHTHALMIC
Qty: 0 | Refills: 0 | DISCHARGE

## 2021-03-16 RX ORDER — ACETAMINOPHEN 500 MG
2 TABLET ORAL
Qty: 0 | Refills: 0 | DISCHARGE

## 2021-03-16 RX ORDER — METOPROLOL TARTRATE 50 MG
25 TABLET ORAL EVERY 12 HOURS
Refills: 0 | Status: DISCONTINUED | OUTPATIENT
Start: 2021-03-16 | End: 2021-03-18

## 2021-03-16 RX ADMIN — Medication 25 MILLIGRAM(S): at 10:53

## 2021-03-16 RX ADMIN — BRIMONIDINE TARTRATE 1 DROP(S): 2 SOLUTION/ DROPS OPHTHALMIC at 18:21

## 2021-03-16 RX ADMIN — Medication 1 DROP(S): at 05:42

## 2021-03-16 RX ADMIN — Medication 1 DROP(S): at 18:21

## 2021-03-16 RX ADMIN — LATANOPROST 1 DROP(S): 0.05 SOLUTION/ DROPS OPHTHALMIC; TOPICAL at 22:02

## 2021-03-16 RX ADMIN — BRIMONIDINE TARTRATE 1 DROP(S): 2 SOLUTION/ DROPS OPHTHALMIC at 05:42

## 2021-03-16 NOTE — PROGRESS NOTE ADULT - ASSESSMENT
-antonio is a 89 year old female, brought in by ambulance from her nursing home after episode of vaginal bleeding. CT scan suspicious for possible uterine/rectal carcinoma. Patient is not able to give a history/consent to pelvic exam 2/2 dementia.     -No vaginal bleeding seen on exam today however unable to do proper  exam as pt's legs are contracted and she is AO x1. No intervention needed at the moment for bleeding.   -Please call gyn if pt becomes consentable for pelvic exam- if mass found on pelvic exam can biopsy tissue   -If vaginal bleeding becomes heavy >2 pads in 2 hours please pg gyn at [phone]   -Would transfuse < 7   -Gyn onc will follow if tissue diagnosis of gyn cancer made   -Benign gyn to follow

## 2021-03-16 NOTE — CONSULT NOTE ADULT - ASSESSMENT
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 GI consulted for rectal mass.     #Rectal mass  - most likely malignant in appearance  - will plan for flex sig w/ biopsy tomorrow  - please keep patient NPO past midnight and give 2 tap water enemas tomorrow AM    Anai Romo MD  PGY-4, Gastroenterology Fellow  pager: [phone]

## 2021-03-16 NOTE — PROGRESS NOTE ADULT - PROBLEM SELECTOR PLAN 2
-GI consult for colonoscopy to biopsy rectal mass in AM -GI consult for colonoscopy to biopsy rectal mass in AM; will further discuss goals of care with patient's family. -GI consulted for possible colonoscopy to biopsy rectal mass; will further discuss goals of care with patient's family.

## 2021-03-16 NOTE — PROGRESS NOTE ADULT - PROBLEM SELECTOR PLAN 3
ADDENDUM: see #1 ; follow up GYN recs  -Per Gyn, no indication for vaginal packing or transfusion at this time   -Maintain active type and screen -Per Gyn, no indication for vaginal packing or transfusion at this time. Will continue to follow and attempt pelvic exam once patient amenable.   -Gyn onc will follow if there is tissue diagnosis proving malignancy.   -Maintain active type and screen

## 2021-03-16 NOTE — PROGRESS NOTE ADULT - PROBLEM SELECTOR PLAN 10
F: on d5 1/2 NS overnight x 24 hrs. S/p 2L NS bolus   E: monitor and replete  N: NPO after midnight pending possible biopsy  GI: none  DVT: SCDs bilaterally (questionable hx of DVT ppx with Eliquis 2.5 BID, however medication not present on med rec, and niece does not recall)     Code Status: Full Code   Dispo:  Mike F: d5 1/2 NS overnight was d/c.   E: monitor and replete  N: NPO after midnight pending possible biopsy  GI: none  DVT: SCDs bilaterally; Eliquis 2.5 BID held.    Code Status: Full Code   Dispo:  Mike F: d5 1/2 NS overnight (3/15), d/cain   E: monitor and replete  N: NPO after midnight pending possible biopsy 3/16  GI: none  DVT: SCDs bilaterally; Eliquis 2.5 BID held in the setting of vaginal bleeding    Code Status: Full Code   Dispo: Alejandro Mckeon F: d5 1/2 NS overnight (3/15), d/cain   E: monitor and replete  N: NPO after midnight pending possible biopsy 3/17  GI: none  DVT: SCDs bilaterally; Eliquis 2.5 BID held in the setting of vaginal bleeding    Code Status: Full Code   Dispo: Alejandro Mckeon

## 2021-03-16 NOTE — PROGRESS NOTE ADULT - SUBJECTIVE AND OBJECTIVE BOX
OVERNIGHT EVENTS:    SUBJECTIVE / INTERVAL HPI: Patient seen and examined at bedside.     VITAL SIGNS:  Vital Signs Last 24 Hrs  T(C): 36.8 (16 Mar 2021 05:45), Max: 37.2 (15 Mar 2021 16:21)  T(F): 98.2 (16 Mar 2021 05:45), Max: 98.9 (15 Mar 2021 16:21)  HR: 65 (16 Mar 2021 05:45) (63 - 65)  BP: 143/70 (16 Mar 2021 05:45) (101/56 - 143/70)  BP(mean): 98 (15 Mar 2021 21:06) (98 - 98)  RR: 16 (16 Mar 2021 05:45) (16 - 18)  SpO2: 100% (16 Mar 2021 05:45) (100% - 100%)    PHYSICAL EXAM:    General: WDWN  HEENT: NC/AT; PERRL, anicteric sclera; MMM  Neck: supple  Cardiovascular: +S1/S2, RRR  Respiratory: CTA B/L; no W/R/R  Gastrointestinal: soft, NT/ND; +BSx4  Extremities: WWP; no edema, clubbing or cyanosis  Vascular: 2+ radial, DP/PT pulses B/L  Neurological: AAOx3; no focal deficits    MEDICATIONS:  MEDICATIONS  (STANDING):  brimonidine 0.2% Ophthalmic Solution 1 Drop(s) Both EYES every 12 hours  latanoprost 0.005% Ophthalmic Solution 1 Drop(s) Both EYES at bedtime  megestrol Suspension 400 milliGRAM(s) Oral every 24 hours  metoprolol tartrate 25 milliGRAM(s) Oral every 12 hours  senna 2 Tablet(s) Oral every 24 hours  timolol 0.25% Solution 1 Drop(s) Both EYES every 12 hours    MEDICATIONS  (PRN):  acetaminophen  Suppository .. 325 milliGRAM(s) Rectal every 6 hours PRN Moderate Pain (4 - 6)      ALLERGIES:  Allergies    No Known Allergies    Intolerances        LABS:                        12.4   10.90 )-----------( 242      ( 16 Mar 2021 08:16 )             38.8     03-16    140  |  111<H>  |  24<H>  ----------------------------<  71  4.9   |  18<L>  |  1.44<H>    Ca    9.9      16 Mar 2021 08:16  Phos  2.8     03-15  Mg     1.9     03-16    TPro  7.2  /  Alb  3.1<L>  /  TBili  0.8  /  DBili  x   /  AST  17  /  ALT  11  /  AlkPhos  55  03-16    PT/INR - ( 16 Mar 2021 08:16 )   PT: 15.3 sec;   INR: 1.29          PTT - ( 16 Mar 2021 08:16 )  PTT:30.1 sec  Urinalysis Basic - ( 15 Mar 2021 11:12 )    Color: Yellow / Appearance: Clear / S.025 / pH: x  Gluc: x / Ketone: NEGATIVE  / Bili: Negative / Urobili: 0.2 E.U./dL   Blood: x / Protein: NEGATIVE mg/dL / Nitrite: NEGATIVE   Leuk Esterase: NEGATIVE / RBC: x / WBC x   Sq Epi: x / Non Sq Epi: x / Bacteria: x      CAPILLARY BLOOD GLUCOSE          RADIOLOGY & ADDITIONAL TESTS: Reviewed. OVERNIGHT EVENTS:    SUBJECTIVE / INTERVAL HPI: Patient seen and examined at bedside. Patient was uncooperative with exam. Was unable to get more information on ROS. She verbalized that she was very thirsty.  She denies any pain.    No significant overnight events. Fluids were d/c overnight due to hyperkalemia of 5.4. Per overnight nurse, the patient is weak and dehydrated. Pt has been nonambulatory and incontinent. Skin exam showed no pressure ulcers.     VITAL SIGNS:  Vital Signs Last 24 Hrs  T(C): 36.8 (16 Mar 2021 05:45), Max: 37.2 (15 Mar 2021 16:21)  T(F): 98.2 (16 Mar 2021 05:45), Max: 98.9 (15 Mar 2021 16:21)  HR: 65 (16 Mar 2021 05:45) (63 - 65)  BP: 143/70 (16 Mar 2021 05:45) (101/56 - 143/70)  BP(mean): 98 (15 Mar 2021 21:06) (98 - 98)  RR: 16 (16 Mar 2021 05:45) (16 - 18)  SpO2: 100% (16 Mar 2021 05:45) (100% - 100%)    PHYSICAL EXAM:    General: Thin, elderly female resting in bed. Pt is demented.  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  Neck: supple  Cardiovascular: +S1/S2, RRR, no murmurs, rubs or gallops.  Respiratory: CTA B/L; no W/R/R. No increased work of breathing   Gastrointestinal: soft, NT/ND; no rebound tenderness or guarding, +BSx4  Extremities: WWP; no edema, clubbing or cyanosis  MSK: Pt has legs flexed close to abdomen and is contracted.   Neurological: AAOx1    MEDICATIONS:  MEDICATIONS  (STANDING):  brimonidine 0.2% Ophthalmic Solution 1 Drop(s) Both EYES every 12 hours  latanoprost 0.005% Ophthalmic Solution 1 Drop(s) Both EYES at bedtime  megestrol Suspension 400 milliGRAM(s) Oral every 24 hours  metoprolol tartrate 25 milliGRAM(s) Oral every 12 hours  senna 2 Tablet(s) Oral every 24 hours  timolol 0.25% Solution 1 Drop(s) Both EYES every 12 hours    MEDICATIONS  (PRN):  acetaminophen  Suppository .. 325 milliGRAM(s) Rectal every 6 hours PRN Moderate Pain (4 - 6)      ALLERGIES:  Allergies    No Known Allergies    Intolerances        LABS:                        12.4   10.90 )-----------( 242      ( 16 Mar 2021 08:16 )             38.8     03-16    140  |  111<H>  |  24<H>  ----------------------------<  71  4.9   |  18<L>  |  1.44<H>    Ca    9.9      16 Mar 2021 08:16  Phos  2.8     03-15  Mg     1.9     03-16    TPro  7.2  /  Alb  3.1<L>  /  TBili  0.8  /  DBili  x   /  AST  17  /  ALT  11  /  AlkPhos  55  03-16    PT/INR - ( 16 Mar 2021 08:16 )   PT: 15.3 sec;   INR: 1.29          PTT - ( 16 Mar 2021 08:16 )  PTT:30.1 sec  Urinalysis Basic - ( 15 Mar 2021 11:12 )    Color: Yellow / Appearance: Clear / S.025 / pH: x  Gluc: x / Ketone: NEGATIVE  / Bili: Negative / Urobili: 0.2 E.U./dL   Blood: x / Protein: NEGATIVE mg/dL / Nitrite: NEGATIVE   Leuk Esterase: NEGATIVE / RBC: x / WBC x   Sq Epi: x / Non Sq Epi: x / Bacteria: x      CAPILLARY BLOOD GLUCOSE          RADIOLOGY & ADDITIONAL TESTS: Reviewed. OVERNIGHT EVENTS: No acute events overnight.     SUBJECTIVE / INTERVAL HPI: Patient seen and examined at bedside. Fluids were d/c overnight due to hyperkalemia of 5.4. Per overnight nurse, the patient is weak and dehydrated. Pt has been nonambulatory and incontinent. Skin exam showed no pressure ulcers.     Patient was uncooperative with exam. Was unable to get more information on ROS. She verbalized that she was very thirsty.  She denies any pain.      VITAL SIGNS:  Vital Signs Last 24 Hrs  T(C): 36.8 (16 Mar 2021 05:45), Max: 37.2 (15 Mar 2021 16:21)  T(F): 98.2 (16 Mar 2021 05:45), Max: 98.9 (15 Mar 2021 16:21)  HR: 65 (16 Mar 2021 05:45) (63 - 65)  BP: 143/70 (16 Mar 2021 05:45) (101/56 - 143/70)  BP(mean): 98 (15 Mar 2021 21:06) (98 - 98)  RR: 16 (16 Mar 2021 05:45) (16 - 18)  SpO2: 100% (16 Mar 2021 05:45) (100% - 100%)    PHYSICAL EXAM:    General: Thin, elderly female resting in bed. Pt is demented.  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  Neck: supple  Cardiovascular: +S1/S2, RRR, no murmurs, rubs or gallops.  Respiratory: CTA B/L; no W/R/R. No increased work of breathing   Gastrointestinal: soft, NT/ND; no rebound tenderness or guarding, +BSx4  Extremities: WWP; no edema, clubbing or cyanosis  MSK: Pt has legs flexed close to abdomen and is contracted.   Neurological: AAOx1    MEDICATIONS:  MEDICATIONS  (STANDING):  brimonidine 0.2% Ophthalmic Solution 1 Drop(s) Both EYES every 12 hours  latanoprost 0.005% Ophthalmic Solution 1 Drop(s) Both EYES at bedtime  megestrol Suspension 400 milliGRAM(s) Oral every 24 hours  metoprolol tartrate 25 milliGRAM(s) Oral every 12 hours  senna 2 Tablet(s) Oral every 24 hours  timolol 0.25% Solution 1 Drop(s) Both EYES every 12 hours    MEDICATIONS  (PRN):  acetaminophen  Suppository .. 325 milliGRAM(s) Rectal every 6 hours PRN Moderate Pain (4 - 6)      ALLERGIES:  Allergies    No Known Allergies    Intolerances        LABS:                        12.4   10.90 )-----------( 242      ( 16 Mar 2021 08:16 )             38.8     03-16    140  |  111<H>  |  24<H>  ----------------------------<  71  4.9   |  18<L>  |  1.44<H>    Ca    9.9      16 Mar 2021 08:16  Phos  2.8     03-15  Mg     1.9     03-16    TPro  7.2  /  Alb  3.1<L>  /  TBili  0.8  /  DBili  x   /  AST  17  /  ALT  11  /  AlkPhos  55  03-16    PT/INR - ( 16 Mar 2021 08:16 )   PT: 15.3 sec;   INR: 1.29          PTT - ( 16 Mar 2021 08:16 )  PTT:30.1 sec  Urinalysis Basic - ( 15 Mar 2021 11:12 )    Color: Yellow / Appearance: Clear / S.025 / pH: x  Gluc: x / Ketone: NEGATIVE  / Bili: Negative / Urobili: 0.2 E.U./dL   Blood: x / Protein: NEGATIVE mg/dL / Nitrite: NEGATIVE   Leuk Esterase: NEGATIVE / RBC: x / WBC x   Sq Epi: x / Non Sq Epi: x / Bacteria: x      CAPILLARY BLOOD GLUCOSE          RADIOLOGY & ADDITIONAL TESTS: Reviewed. OVERNIGHT EVENTS: No acute events overnight.     SUBJECTIVE / INTERVAL HPI: Patient seen and examined at bedside. Fluids were d/c overnight due to correction of Na+ and hyperkalemia of 5.4, given lokelma w/ K+ 4.9 this AM. Per overnight nurse, pt is nonambulatory and incontinent, skin exam showed no pressure ulcers. This AM, pt responding to some questions, uncooperative with physical exam. ROS limited 2/2 pts baseline mental status. She verbalized that she was very thirsty.  She denies any pain.      VITAL SIGNS:  Vital Signs Last 24 Hrs  T(C): 36.8 (16 Mar 2021 05:45), Max: 37.2 (15 Mar 2021 16:21)  T(F): 98.2 (16 Mar 2021 05:45), Max: 98.9 (15 Mar 2021 16:21)  HR: 65 (16 Mar 2021 05:45) (63 - 65)  BP: 143/70 (16 Mar 2021 05:45) (101/56 - 143/70)  BP(mean): 98 (15 Mar 2021 21:06) (98 - 98)  RR: 16 (16 Mar 2021 05:45) (16 - 18)  SpO2: 100% (16 Mar 2021 05:45) (100% - 100%)    PHYSICAL EXAM:    General: Thin, elderly female, w/ significant LE contractures resting in bed  HEENT: mucous membranes dry, bilateral pupils with cataracts (patient is blind at baseline), no oropharyngeal erythema, no mucosal bleeding, no thyromegaly.  Neck: supple  Cardiovascular: +S1/S2, RRR, no murmurs, rubs or gallops.  Respiratory: CTA B/L; no W/R/R. No increased work of breathing   Gastrointestinal: soft, NT/ND; no rebound tenderness or guarding, +BSx4  Extremities: WWP; no edema, clubbing or cyanosis  MSK: +LE contracted.   Neurological: AAOx1    MEDICATIONS:  MEDICATIONS  (STANDING):  brimonidine 0.2% Ophthalmic Solution 1 Drop(s) Both EYES every 12 hours  latanoprost 0.005% Ophthalmic Solution 1 Drop(s) Both EYES at bedtime  megestrol Suspension 400 milliGRAM(s) Oral every 24 hours  metoprolol tartrate 25 milliGRAM(s) Oral every 12 hours  senna 2 Tablet(s) Oral every 24 hours  timolol 0.25% Solution 1 Drop(s) Both EYES every 12 hours    MEDICATIONS  (PRN):  acetaminophen  Suppository .. 325 milliGRAM(s) Rectal every 6 hours PRN Moderate Pain (4 - 6)      ALLERGIES:  Allergies    No Known Allergies    Intolerances        LABS:                        12.4   10.90 )-----------( 242      ( 16 Mar 2021 08:16 )             38.8     03-16    140  |  111<H>  |  24<H>  ----------------------------<  71  4.9   |  18<L>  |  1.44<H>    Ca    9.9      16 Mar 2021 08:16  Phos  2.8     03-15  Mg     1.9     03-16    TPro  7.2  /  Alb  3.1<L>  /  TBili  0.8  /  DBili  x   /  AST  17  /  ALT  11  /  AlkPhos  55  03-16    PT/INR - ( 16 Mar 2021 08:16 )   PT: 15.3 sec;   INR: 1.29          PTT - ( 16 Mar 2021 08:16 )  PTT:30.1 sec  Urinalysis Basic - ( 15 Mar 2021 11:12 )    Color: Yellow / Appearance: Clear / S.025 / pH: x  Gluc: x / Ketone: NEGATIVE  / Bili: Negative / Urobili: 0.2 E.U./dL   Blood: x / Protein: NEGATIVE mg/dL / Nitrite: NEGATIVE   Leuk Esterase: NEGATIVE / RBC: x / WBC x   Sq Epi: x / Non Sq Epi: x / Bacteria: x      CAPILLARY BLOOD GLUCOSE          RADIOLOGY & ADDITIONAL TESTS: Reviewed.

## 2021-03-16 NOTE — PROGRESS NOTE ADULT - PROBLEM SELECTOR PLAN 7
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 Is on eye drops   -Continue combigan and latanoprost inpatient No murmur auscultated on exam   -F/u TTE ordered      ADDENDUM:   #dementia: pt at baseline, will check b12/folate /TSH No murmur auscultated on exam   -F/u TTE ordered      ADDENDUM:   #dementia: pt at baseline, will check b12/folate  -TSH WNL

## 2021-03-16 NOTE — CONSULT NOTE ADULT - PROBLEM SELECTOR RECOMMENDATION 6
-would recommend DNR DNI as pt will not return to preadmission functional or cognitive statuses if resuscitated  -niece aware of potentially fatal risk of CPR in frail pt  -continue to readdress

## 2021-03-16 NOTE — CONSULT NOTE ADULT - SUBJECTIVE AND OBJECTIVE BOX
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 The patient is unable to provide adequate history but team was able to contact niece who reported that this vaginal bleeding has been ongoing for months. Unclear if the patient had any recent weight loss or changes since the Niece was not able to see her for a long time.  The patient herself denies CP, SOB, palpitations, dizziness, weakness, abdominal pain, N/V/D, dysuria, vaginal bleeding. The patient is on Eliquis for an unknown reason, niece is unsure. CT scan showed  "gastrointestinal tract demonstrates severe circumferential masslike mural thickening of the rectum with a discrete solid mass on the right rectal wall measuring 4.7 x 5.3 cm. There is normal appearance of the appendix. 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 There is significant distention of the uterus with blood with internal enhancing masslike material spanning 5.0 x 5.9 cm. The bladder is unremarkable. No free fluid. No adenopathy. Moderate to severe vascular calcification. Small hiatal hernia. Opacified aspects of the portal, splenic, superior mesenteric vein, bilateral renal veins, IVC and bilateral iliac veins demonstrates no maria elena intraluminal thrombus." [facility] discussion with niece on admission revealed that the patient recently told the niece that she wants to "live as long as possible."     Vital Signs Last 24 Hrs  T(C): 36.8 (16 Mar 2021 05:45), Max: 37.8 (15 Mar 2021 09:40)  T(F): 98.2 (16 Mar 2021 05:45), Max: 100 (15 Mar 2021 09:40)  HR: 65 (16 Mar 2021 05:45) (63 - 71)  BP: 143/70 (16 Mar 2021 05:45) (101/56 - 151/85)  BP(mean): 98 (15 Mar 2021 21:06) (98 - 98)  RR: 16 (16 Mar 2021 05:45) (16 - 18)  SpO2: 100% (16 Mar 2021 05:45) (100% - 100%)    Physical Exam:  General: NAD, pleasantly demented   Pulmonary: Nonlabored breathing, no respiratory distress  Abdominal: soft, nondistended, nontender throughout with no rebound or guarding  Rectal: no blood in rectal vault, no palpable mass   Extremities: WWP, normal strength, no clubbing/cyanosis/edema  Neuro: A/O x1 (baseline)     I&O's Summary    15 Mar 2021 07:01  -  16 Mar 2021 07:00  --------------------------------------------------------  IN: 0 mL / OUT: 250 mL / NET: -250 mL        LABS:                        12.1   11.21 )-----------( 216      ( 15 Mar 2021 22:10 )             38.3     03-15    142  |  112<H>  |  29<H>  ----------------------------<  91  5.4<H>   |  18<L>  |  1.42<H>    Ca    9.0      15 Mar 2021 22:10  Phos  2.8     03-15  Mg     1.9     03-15    TPro  7.3  /  Alb  3.2<L>  /  TBili  0.5  /  DBili  x   /  AST  18  /  ALT  12  /  AlkPhos  55  03-15    PT/INR - ( 15 Mar 2021 11:39 )   PT: 17.9 sec;   INR: 1.52          PTT - ( 15 Mar 2021 11:39 )  PTT:25.7 sec  Urinalysis Basic - ( 15 Mar 2021 11:12 )    Color: Yellow / Appearance: Clear / S.025 / pH: x  Gluc: x / Ketone: NEGATIVE  / Bili: Negative / Urobili: 0.2 E.U./dL   Blood: x / Protein: NEGATIVE mg/dL / Nitrite: NEGATIVE   Leuk Esterase: NEGATIVE / RBC: x / WBC x   Sq Epi: x / Non Sq Epi: x / Bacteria: x      CAPILLARY BLOOD GLUCOSE        LIVER FUNCTIONS - ( 15 Mar 2021 11:58 )  Alb: 3.2 g/dL / Pro: 7.3 g/dL / ALK PHOS: 55 U/L / ALT: 12 U/L / AST: 18 U/L / GGT: x             RADIOLOGY & ADDITIONAL STUDIES:

## 2021-03-16 NOTE — CONSULT NOTE ADULT - PROBLEM SELECTOR RECOMMENDATION 3
CT Abdomen and Pelvis on 3/15: severe circumferential masslike mural thickening of the rectum with a discrete solid mass on the right rectal wallmeasuring 4.7 x 5.3 cm.   -Surgery 3/16: no acute surgical intervention indicated   -Appreciate GI recs: plan for flex sig with bx tomorrow 3/17  -goc with niece Tania ongoing

## 2021-03-16 NOTE — CONSULT NOTE ADULT - SUBJECTIVE AND OBJECTIVE BOX
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 Patient is AAOx1-2 at baseline and cannot provide answers 2/2 dementia. In ED, partial history obtained from niece (Tania Kohler) who states that patient has had vaginal bleeding for more than 2 months and she told rehab center to bring patient into the hospital. The patient herself was not aware that she is bleeding from her vagina and denies any chest pain, dizziness, shortness of breath, abdominal pain, nausea, vomiting. Further history unable to be obtained from patient. However, she does state that there is no history of cancer in her family. Patient is on Eliquis for unknown reason, however medication was not on med rec from pharmacy.     PAST MEDICAL & SURGICAL HISTORY:  H/O blindness  Mitral regurgitation  Glaucoma  Shingles  H/O eye surgery    FAMILY HISTORY:  FH: arthritis  sister, grandmother    SOCIAL HISTORY:  single, no children. sister lives in [place]. resident of [facility]    ROS:    Unable to attain due to:  pt mentation, oriented x0    Pain:     No    Opiate Naive (Y/N): y  -iStop reviewed (Y/N): Yes.   No Rx found on iStop review (Ref#:  [id number]    Medications:      MEDICATIONS  (STANDING):  brimonidine 0.2% Ophthalmic Solution 1 Drop(s) Both EYES every 12 hours  latanoprost 0.005% Ophthalmic Solution 1 Drop(s) Both EYES at bedtime  megestrol Suspension 400 milliGRAM(s) Oral every 24 hours  metoprolol tartrate 25 milliGRAM(s) Oral every 12 hours  senna 2 Tablet(s) Oral every 24 hours  timolol 0.25% Solution 1 Drop(s) Both EYES every 12 hours    MEDICATIONS  (PRN):  acetaminophen  Suppository .. 325 milliGRAM(s) Rectal every 6 hours PRN Moderate Pain (4 - 6)      Labs:    CBC:                        12.4   10.90 )-----------( 242      ( 16 Mar 2021 08:16 )             38.8     CMP:    -16    140  |  111<H>  |  24<H>  ----------------------------<  71  4.9   |  18<L>  |  1.44<H>    Ca    9.9      16 Mar 2021 08:16  Phos  2.8     -15  Mg     1.9     -16    TPro  7.2  /  Alb  3.1<L>  /  TBili  0.8  /  DBili  x   /  AST  17  /  ALT  11  /  AlkPhos  55     Albumin, Serum: 3.1 g/dL (21 @ 08:16)  PT/INR - ( 16 Mar 2021 08:16 )   PT: 15.3 sec;   INR: 1.29       PTT - ( 16 Mar 2021 08:16 )  PTT:30.1 sec   Urinalysis Basic - ( 15 Mar 2021 11:12 )    Color: Yellow / Appearance: Clear / S.025 / pH: x  Gluc: x / Ketone: NEGATIVE  / Bili: Negative / Urobili: 0.2 E.U./dL   Blood: x / Protein: NEGATIVE mg/dL / Nitrite: NEGATIVE   Leuk Esterase: NEGATIVE / RBC: x / WBC x   Sq Epi: x / Non Sq Epi: x / Bacteria: x    Imaging:  Reviewed    < from: CT Abdomen and Pelvis w/ Oral Cont and w/ IV Cont (03.15.21 @ 13:35) >  IMPRESSION:  Uterine carcinoma  Rectal carcinoma.      < from: Xray Chest 1 View AP/PA (03.15.21 @ 11:35) >  Findings/  impression: Heart size within normal limits, thoracic aortic calcification. No acute focal opacity. Bilateral chronic interstitial lung disease, unchanged.. Stable bony structures. Dextroscoliosis. Left breast punctate density.      PEx:  T(C): 36.7 (21 @ 11:10), Max: 37.2 (03-15-21 @ 16:21)  HR: 70 (21 @ 11:10) (63 - 70)  BP: 138/72 (21 @ 11:10) (101/56 - 143/70)  RR: 16 (21 @ 11:10) (16 - 18)  SpO2: 99% (21 @ 11:10) (99% - 100%)  Wt(kg): --      General: Frail geriatric female, contracted, lying in bed NAD  HEENT: mucous membranes dry, bilateral pupils with cataracts (patient is blind at baseline), no oropharyngeal erythema, no mucosal bleeding, no thyromegaly.  Neck: supple  Cardiovascular: +S1/S2, RRR, no murmurs, gallops.  Respiratory: CTA B/L; no W/R/R. No increased work of breathing   Gastrointestinal: soft, NT/ND; no rebound tenderness or guarding, +BSx4  Extremities: WWP; no edema  MSK: +LE contracted.   Neurological: AAOx1  Preadmit Karnofsky:  30%             Current Karnofsky:    30 %  http://www.npcrc.org/files/news/karnofsky_performance_scale.pdf   http://www.npcrc.org/files/news/palliative_performance_scale_PPSv2.pdf      BMI: 20.9  Wt: 48.6  Cachexia (Y/N):  N    Decision maker: The patient is UNable to participate in complex medical decision making conversations.   Legal surrogate:  Niece, Tania Kohler #[phone]    ADVANCE DIRECTIVES  - addressed with Tania by telephone today  - Full Code    GOALS OF CARE DISCUSSION  - Palliative care info/support provided to Tania      - Reviewed pt's hospital course, ongoing workup of malignancy

## 2021-03-16 NOTE — PROGRESS NOTE ADULT - PROBLEM SELECTOR PLAN 6
Hold home amlodipine in setting of chronic bleeding issue, restart prn    #DVT PPx with Eliquis (?)  Per notes, patient may be on DVT PPx with Eliquis 2.5mg BID, however the medication is not on the patient's pharmacy list, and the niece denies that her aunt is on any anticoagulants. Additionally, US Duplex from last admission in 2020 shows US Duplex negative for any DVT. Per nursing home, pt not on amlodipine. Currently prescribed metoprolol tartrate 25mg BID  - Continue home dose    #DVT PPx with Eliquis (?)  Per notes, patient may be on DVT PPx with Eliquis 2.5mg BID, however the medication is not on the patient's pharmacy list, and the niece denies that her aunt is on any anticoagulants. Additionally, US Duplex from last admission in 2020 shows US Duplex negative for any DVT. Spoke to provider at nursing home Dr. Rojo (cell [phone]) who confirmed elliquis 2.5mg BID since 7/2020 for primary ppx DVT as it is "easier to give a pill than a shot".   - Will hold elliquis in the setting of vaginal bleeding

## 2021-03-16 NOTE — PROGRESS NOTE ADULT - ASSESSMENT
89 year old female, dementia, MR, shingles and Glaucoma, brought in by ambulance from her nursing home after episode of vaginal bleeding at 8am. CT scan showed possible uterine and rectal Ca. AVSS, no bleeding at this time.     - No acute surgical intervention indicated at this time. Please send tumor markers and consult GI for colonoscopy for tissue diagnosis. Patient still full code per family   - F/u GI recs   - Discuss with family again regarding GOC and f/u palliative care recs   - Will follow   - Discussed with Dr. Milligan 89 year old female, dementia, MR, shingles and Glaucoma, brought in by ambulance from her nursing home after episode of vaginal bleeding at 8am. CT scan showed possible uterine and rectal Ca. AVSS, no bleeding at this time.     - No acute surgical intervention indicated at this time. Please send tumor markers and consult GI for colonoscopy for tissue diagnosis. Patient still full code per family   - F/u GI recs   - Discuss with family again regarding GOC and f/u palliative care recs   - Will reach out to colorectal surgery for co-management of possible rectal cancer   - Will follow   - Discussed with Dr. Milligan

## 2021-03-16 NOTE — PROGRESS NOTE ADULT - SUBJECTIVE AND OBJECTIVE BOX
SUBJECTIVE: Patient seen and examined at bedside. Resting comfortably at A&O x0 baseline. Reports no abdominal pain at this time.       Vital Signs Last 24 Hrs  T(C): 36.8 (16 Mar 2021 05:45), Max: 37.8 (15 Mar 2021 09:40)  T(F): 98.2 (16 Mar 2021 05:45), Max: 100 (15 Mar 2021 09:40)  HR: 65 (16 Mar 2021 05:45) (63 - 71)  BP: 143/70 (16 Mar 2021 05:45) (101/56 - 151/85)  BP(mean): 98 (15 Mar 2021 21:06) (98 - 98)  RR: 16 (16 Mar 2021 05:45) (16 - 18)  SpO2: 100% (16 Mar 2021 05:45) (100% - 100%)    Physical Exam:  General: NAD, pleasantly demented   Pulmonary: Nonlabored breathing, no respiratory distress  Abdominal: soft, nondistended, nontender throughout with no rebound or guarding  Extremities: WWP, normal strength, no clubbing/cyanosis/edema  Neuro: A/O x1 (baseline)     Lines/drains/tubes:    I&O's Summary      LABS:                        12.1   11.21 )-----------( 216      ( 15 Mar 2021 22:10 )             38.3     03-15    142  |  112<H>  |  29<H>  ----------------------------<  91  5.4<H>   |  18<L>  |  1.42<H>    Ca    9.0      15 Mar 2021 22:10  Phos  2.8     03-15  Mg     1.9     03-15    TPro  7.3  /  Alb  3.2<L>  /  TBili  0.5  /  DBili  x   /  AST  18  /  ALT  12  /  AlkPhos  55  03-15    PT/INR - ( 15 Mar 2021 11:39 )   PT: 17.9 sec;   INR: 1.52          PTT - ( 15 Mar 2021 11:39 )  PTT:25.7 sec  Urinalysis Basic - ( 15 Mar 2021 11:12 )    Color: Yellow / Appearance: Clear / S.025 / pH: x  Gluc: x / Ketone: NEGATIVE  / Bili: Negative / Urobili: 0.2 E.U./dL   Blood: x / Protein: NEGATIVE mg/dL / Nitrite: NEGATIVE   Leuk Esterase: NEGATIVE / RBC: x / WBC x   Sq Epi: x / Non Sq Epi: x / Bacteria: x      CAPILLARY BLOOD GLUCOSE        LIVER FUNCTIONS - ( 15 Mar 2021 11:58 )  Alb: 3.2 g/dL / Pro: 7.3 g/dL / ALK PHOS: 55 U/L / ALT: 12 U/L / AST: 18 U/L / GGT: x             RADIOLOGY & ADDITIONAL STUDIES:

## 2021-03-16 NOTE — CONSULT NOTE ADULT - PROBLEM SELECTOR RECOMMENDATION 9
progressive dementia, FAST7d. bedbound, total care and full feed. minimally verbal.   -assistance with all ADLs, skin care prn  -dispo : back to [facility]

## 2021-03-16 NOTE — CONSULT NOTE ADULT - PROBLEM SELECTOR RECOMMENDATION 7
geriatric female with multiple co morbidities including severe dementia admitted for workup of malignancy with two primaries  -unable to participate in GOC dt mentation  -onc plan pending workup  -GOC discussions to continue with pt's niece Tania   -symptom management as above  -would qualify for hospice   -will continue to follow with you

## 2021-03-16 NOTE — CONSULT NOTE ADULT - ASSESSMENT
89 y/o F with PMHx MR, dementia, blind in both eyes, HTN, on Eliquis for DVT ppx (?) from [facility] admitted 3/15 for vaginal bleeding. Imaging on admission c/f uterine and rectal carcinoma. Palliative consulted for GOC.

## 2021-03-16 NOTE — PROGRESS NOTE ADULT - PROBLEM SELECTOR PLAN 5
[IMPROVING]  Baseline creatinine appears to be about 1.4-1.5, creatinine on admission 1.69, c/w CAYLA. Likely pre-renal in nature due to dehydration on exam.   -S/p 2L NS bolus, and D5 1/2 NS x 24 hrs  -Continue to trend renal function. If not improving can obtain urine studies [IMPROVING]  AM creatinine 1.44. Baseline creatinine appears to be about 1.4-1.5, creatinine on admission 1.69, c/w CAYLA. Likely pre-renal in nature due to dehydration on exam.   -Continue to trend renal function. If not improving can obtain urine studies Na  > admission 148. Likely 2/2 poor PO intake  - Na 140 this AM  - Now resolved

## 2021-03-16 NOTE — PROGRESS NOTE ADULT - PROBLEM SELECTOR PLAN 1
Patient with vaginal bleeding x 1 day, hemodynamically stable. On exam, patient noted to be bleeding at vaginal introitus, however DHIRAJ negative. Imaging concerning for uterine and rectal carcinoma. Unclear history regarding history of cancer, symptoms including weight loss, fatigue etc. 2/2 patient's dementia.   -Gynecology evaluated patient in ED - no indication for transfusion or vaginal packing at this time. Gyn onc will follow if there is tissue diagnosis proving malignancy. Benign gyn will continue to follow and attempt pelvic exam once patient amenable.   -Per conversation with patient's niece, patient will have full work up and is full code   -See "Goals of care" below for more details regarding GOC discussion.   -Surgery consulted in ED - no acute surgical intervention at this time, recommend colonoscopy   -Consider GI consult in AM for colonoscopy for biopsy of rectal mass  -Palliative care consulted, f/u recs   -F/u tumor markers (CEA, CA-125, etc.) Patient with vaginal bleeding 2 months per neice, hemodynamically stable. On exam in ED, patient noted to be bleeding at vaginal introitus, however DHIRAJ negative. CTA/P concerning for uterine and rectal carcinoma. Unclear history regarding history of cancer, symptoms including weight loss, fatigue etc. 2/2 patient's dementia (baseline A&Ox1).   -Gynecology evaluated patient in ED - no indication for transfusion or vaginal packing at this time. Gyn onc will follow if there is tissue diagnosis proving malignancy. Benign gyn will continue to follow and attempt pelvic exam once patient amenable.   -Per conversation with patient's niece, requesting full work up, full code   -Surgery consulted in ED - no acute surgical intervention at this time, recommend colonoscopy. Colorectal consulted  - Colorectal surg - no acute surgical intervention as mass is nonobstructive. Recommend pelvic MRI, colonoscopy w/ biopsy, tumor markers, heme/onc consult  -Consider GI consult in AM for colonoscopy for biopsy of rectal mass  -Palliative care consulted, f/u recs   -F/u tumor markers (CEA, CA-125, etc.)

## 2021-03-16 NOTE — PROGRESS NOTE ADULT - ASSESSMENT
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 Imaging on admission c/f uterine and rectal carcinoma. Admitted for further management.  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 CTA/P c/f uterine and rectal carcinoma, admitted for further work up.

## 2021-03-16 NOTE — PROGRESS NOTE ADULT - PROBLEM SELECTOR PLAN 8
No murmur auscultated on exam   -F/u TTE ordered      ADDENDUM:   #dementia: pt at baseline, will check b12/folate /TSH Patient has history of blindness in both eyes, as well as glaucoma with surgery. Is on eye drops   -Continue combigan and latanoprost inpatient Patient has history of blindness in both eyes, s/p glaucoma surgery.   -Continue combigan and latanoprost eyedrops inpatient

## 2021-03-16 NOTE — PROGRESS NOTE ADULT - PROBLEM SELECTOR PLAN 9
Spoke with niece, Ms. Tania Kohler on admission. Tania is the only living kin of patient, and therefore has been making all of her medical decisions for the past 5-6 years, despite not officially signing any form to be her HCP. Per discussion with Tania, the patient has no children and expressed to Tania recently that she wants to "live as long as possible".   -Risks and benefits of pursuing non-operative vs operative treatments for likely uterine and colorectal malignancy were outlined with Tania - she understands that her aunt may not be a surgical candidate and that she may want to simply rest and "let the cancer take over". However at this time, she wishes for her aunt to remain full code, for all extraordinary measures to be taken to keep her aunt alive, and to obtain tissue diagnosis of the mass so they can consider future treatment options.   -Tania would like to pursue colonoscopy for possible biopsy   -States that she may reconsider goals of care/code status depending on prognosis  -F/u palliative care consult

## 2021-03-16 NOTE — CONSULT NOTE ADULT - SUBJECTIVE AND OBJECTIVE BOX
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 for vaginal bleeding x 2 months. Patient is AAOx1-2 at baseline and cannot provide answers 2/2 dementia. In ED, partial history obtained from niece (Tania Kohler) who states that patient has had vaginal bleeding for more than 2 months and she told rehab center to bring patient into the hospital. The patient herself was not aware that she is bleeding from her vagina and denies any chest pain, dizziness, shortness of breath, abdominal pain, nausea, vomiting. Further history unable to be obtained from patient. However, she does state that there is no history of cancer in her family. Patient is on Eliquis for unknown reason, however medication was not on med rec from pharmacy.     ED Course:   Vitals: /85, P 71, R 18, T 100F, O2 100% RA  Labs: no leukocytosis or anemia, , chloride 113, BUN/Cr 34/1.69 (baseline 1.5-1.6), lactate 2.8, VBG normal, UA unremarkable   Imaging: CTAP w/oral and IV contrast: findings c/f uterine and rectal carcinoma. severe circumferential masslike mural thickening of rectum with discrete solid mass on R rectal wall (4.7x5.3 cm), pelvis shows myomatous uterus with distension of uterus with blood and internal enhancing masslike material spanning 5.0x5.9 cm. No adenopathy noted.    Consults: Gynecology and Surgery. ED contacted niece who states she would like everything done for patient.   Meds: 2L NS bolus  (15 Mar 2021 17:51)    GI consulted for rectal mass. Patient seen and examined at bedside.     Allergies    No Known Allergies    Intolerances      Home Medications:  apixaban 2.5 mg oral tablet: 1 tab(s) orally 2 times a day (16 Mar 2021 11:37)  Colace 100 mg oral capsule: 1 cap(s) orally 2 times a day (16 Mar 2021 11:37)  megestrol: 10 milliliter(s) orally once a day (16 Mar 2021 11:37)  Metoprolol Tartrate 25 mg oral tablet: 1 tab(s) orally 2 times a day (16 Mar 2021 11:37)  Tylenol 325 mg oral tablet: 2 tab(s) orally every 6 hours, As Needed (16 Mar 2021 11:37)    MEDICATIONS:  MEDICATIONS  (STANDING):  brimonidine 0.2% Ophthalmic Solution 1 Drop(s) Both EYES every 12 hours  latanoprost 0.005% Ophthalmic Solution 1 Drop(s) Both EYES at bedtime  megestrol Suspension 400 milliGRAM(s) Oral every 24 hours  metoprolol tartrate 25 milliGRAM(s) Oral every 12 hours  senna 2 Tablet(s) Oral every 24 hours  timolol 0.25% Solution 1 Drop(s) Both EYES every 12 hours    MEDICATIONS  (PRN):  acetaminophen  Suppository .. 325 milliGRAM(s) Rectal every 6 hours PRN Moderate Pain (4 - 6)    PAST MEDICAL & SURGICAL HISTORY:  H/O blindness    Mitral regurgitation    Glaucoma    Shingles    H/O eye surgery      FAMILY HISTORY:  FH: arthritis  sister, grandmother      SOCIAL HISTORY:  Tobacco: [ ] Current, [ ] Former, [ ] Never; Pack Years:  Alcohol:  Illicit Drugs:    REVIEW OF SYSTEMS:  Unable to obtain given dementia.     Vital Signs Last 24 Hrs  T(C): 36.7 (16 Mar 2021 11:10), Max: 37.2 (15 Mar 2021 16:21)  T(F): 98.1 (16 Mar 2021 11:10), Max: 98.9 (15 Mar 2021 16:21)  HR: 70 (16 Mar 2021 11:10) (63 - 70)  BP: 138/72 (16 Mar 2021 11:10) (101/56 - 143/70)  BP(mean): 98 (15 Mar 2021 21:06) (98 - 98)  RR: 16 (16 Mar 2021 11:10) (16 - 18)  SpO2: 99% (16 Mar 2021 11:10) (99% - 100%)    03-15 @ 07:01  -  03-16 @ 07:00  --------------------------------------------------------  IN: 0 mL / OUT: 250 mL / NET: -250 mL        PHYSICAL EXAM:    General: frail, elderly; in no acute distress  Eyes: Anicteric sclerae, moist conjunctivae  HENT: Moist mucous membranes  Neck: Trachea midline, supple  Lungs: Normal respiratory effort, no intercostal retractions  Cardiovascular: RRR  Abdomen: Soft, non-tender non-distended; Normal bowel sounds; No rebound or guarding  Extremities: Normal range of motion, No clubbing, cyanosis or edema  Neurological: Alert and oriented x1  Skin: Warm and dry. No obvious rash    LABS:                        12.4   10.90 )-----------( 242      ( 16 Mar 2021 08:16 )             38.8     03-16    140  |  111<H>  |  24<H>  ----------------------------<  71  4.9   |  18<L>  |  1.44<H>    Ca    9.9      16 Mar 2021 08:16  Phos  2.8     03-15  Mg     1.9     03-16    TPro  7.2  /  Alb  3.1<L>  /  TBili  0.8  /  DBili  x   /  AST  17  /  ALT  11  /  AlkPhos  55  03-16        PT/INR - ( 16 Mar 2021 08:16 )   PT: 15.3 sec;   INR: 1.29          PTT - ( 16 Mar 2021 08:16 )  PTT:30.1 sec    RADIOLOGY & ADDITIONAL STUDIES:     Reviewed

## 2021-03-16 NOTE — CONSULT NOTE ADULT - ASSESSMENT
89 year old female, dementia, MR, shingles and Glaucoma, brought in by ambulance from her nursing home after episode of vaginal bleeding at 8am. CT scan showed possible uterine and rectal Ca. AVSS, no bleeding at this time. No bleeding on DHIRAJ. Colorectal surgery consulted for management of possible rectal malignancy.     - No acute surgical intervention indicated at this time as further workup is required. Please follow up tumor markers and consult GI for colonoscopy for tissue diagnosis. The mass is currently nonobstructive so no diversion necessary at this point.   - Please obtain MRI of the pelvis for further characterization of the mass  - F/u GI and Gyn recs   - Ongoing discussion regarding GOC and f/u palliative care recs.  Patient still full code per family at this time.   - Pending tissue diagnosis, heme/onc consult   - Will follow   - Discussed with Dr. Walton

## 2021-03-16 NOTE — CONSULT NOTE ADULT - PROBLEM SELECTOR RECOMMENDATION 4
-hx 1 day of vaginal bleeding on admission  -CT Abdomen and Pelvis: 3/15 distention of the uterus with blood with internal enhancing masslike material spanning 5.0 x 5.9 cm.   -gyn onc following if tissue dx of gyn cancer is made after tomorrow bx as above  -goc with nijames Marin ongoing

## 2021-03-16 NOTE — PROGRESS NOTE ADULT - PROBLEM SELECTOR PLAN 4
Na 146, repeat 148 on admission. Likely 2/2 poor PO intake, mucous membranes dry on admission. S/p 2LNS bolus.   -Given up-trending sodium, started D5/ 1/2 NS (60cc/hr x 24 hours)   -Repeat BMP at 10 PM with improvement in Sodium to 142 Na 140, repeat 148 on admission. Likely 2/2 poor PO intake, mucous membranes dry on admission. Baseline creatinine appears to be about 1.4-1.5, creatinine on admission 1.69, c/w CAYLA. Likely pre-renal in nature due to dehydration on exam.   -3/16 Cr 1.44  -Continue to trend renal function. If not improving can obtain urine studies

## 2021-03-16 NOTE — CONSULT NOTE ADULT - PROBLEM SELECTOR RECOMMENDATION 5
-pt unable to participate in GOC due to baseline mentation  -telephone call to pt's niece Tania today. Discussed pt's hospital course, malignancy workup, reviewed pt's functional status, and explored what would be an acceptable quality of life for patient   -niece electing to continue further workup of malignancy  -onc plan pending work up, goc to continue  -niece appears to want any disease directed therapy that is offered to pt

## 2021-03-16 NOTE — PROGRESS NOTE ADULT - SUBJECTIVE AND OBJECTIVE BOX
SUBJECTIVE: Patient evaluated at bedside. Patient not verbal when rounded on. When asked if she has pain she did not respond.      OBJECTIVE:  VITALS  T(C): 36.7 (03-16-21 @ 11:10), Max: 36.8 (03-16-21 @ 05:45)  HR: 70 (03-16-21 @ 11:10) (65 - 70)  BP: 138/72 (03-16-21 @ 11:10) (138/72 - 143/70)  RR: 16 (03-16-21 @ 11:10) (16 - 16)  SpO2: 99% (03-16-21 @ 11:10) (99% - 100%)    PHYSICAL EXAM   GA: NAD   Resp: normal respiratory effort  : primafit in place, small amount dried blood on left thigh, pt unable to move legs so unable to do a proper exam however no active vaginal or rectal bleeding seen on limited visual exam     LABS    03-15 @ 07:01  -  03-16 @ 07:00  --------------------------------------------------------  IN: 0 mL / OUT: 250 mL / NET: -250 mL                          12.4   10.90 )-----------( 242      ( 16 Mar 2021 08:16 )             38.8     03-16    140  |  111<H>  |  24<H>  ----------------------------<  71  4.9   |  18<L>  |  1.44<H>    Ca    9.9      16 Mar 2021 08:16  Phos  2.8     03-15  Mg     1.9     03-16    TPro  7.2  /  Alb  3.1<L>  /  TBili  0.8  /  DBili  x   /  AST  17  /  ALT  11  /  AlkPhos  55  03-16      MEDICATIONS  acetaminophen  Suppository .. 325 milliGRAM(s) Rectal every 6 hours PRN  brimonidine 0.2% Ophthalmic Solution 1 Drop(s) Both EYES every 12 hours  latanoprost 0.005% Ophthalmic Solution 1 Drop(s) Both EYES at bedtime  megestrol Suspension 400 milliGRAM(s) Oral every 24 hours  metoprolol tartrate 25 milliGRAM(s) Oral every 12 hours  senna 2 Tablet(s) Oral every 24 hours  timolol 0.25% Solution 1 Drop(s) Both EYES every 12 hours

## 2021-03-17 ENCOUNTER — TRANSCRIPTION ENCOUNTER (OUTPATIENT)
Age: 86
End: 2021-03-17

## 2021-03-17 DIAGNOSIS — Z66 DO NOT RESUSCITATE: ICD-10-CM

## 2021-03-17 LAB
ALBUMIN SERPL ELPH-MCNC: 3 G/DL — LOW (ref 3.3–5)
ALP SERPL-CCNC: 51 U/L — SIGNIFICANT CHANGE UP (ref 40–120)
ALT FLD-CCNC: 10 U/L — SIGNIFICANT CHANGE UP (ref 10–45)
ANION GAP SERPL CALC-SCNC: 11 MMOL/L — SIGNIFICANT CHANGE UP (ref 5–17)
AST SERPL-CCNC: 18 U/L — SIGNIFICANT CHANGE UP (ref 10–40)
BILIRUB SERPL-MCNC: 0.9 MG/DL — SIGNIFICANT CHANGE UP (ref 0.2–1.2)
BUN SERPL-MCNC: 23 MG/DL — SIGNIFICANT CHANGE UP (ref 7–23)
CALCIUM SERPL-MCNC: 10 MG/DL — SIGNIFICANT CHANGE UP (ref 8.4–10.5)
CHLORIDE SERPL-SCNC: 113 MMOL/L — HIGH (ref 96–108)
CO2 SERPL-SCNC: 18 MMOL/L — LOW (ref 22–31)
CREAT SERPL-MCNC: 1.6 MG/DL — HIGH (ref 0.5–1.3)
GLUCOSE SERPL-MCNC: 62 MG/DL — LOW (ref 70–99)
HCT VFR BLD CALC: 39.8 % — SIGNIFICANT CHANGE UP (ref 34.5–45)
HGB BLD-MCNC: 12.9 G/DL — SIGNIFICANT CHANGE UP (ref 11.5–15.5)
MAGNESIUM SERPL-MCNC: 2 MG/DL — SIGNIFICANT CHANGE UP (ref 1.6–2.6)
MCHC RBC-ENTMCNC: 30.8 PG — SIGNIFICANT CHANGE UP (ref 27–34)
MCHC RBC-ENTMCNC: 32.4 GM/DL — SIGNIFICANT CHANGE UP (ref 32–36)
MCV RBC AUTO: 95 FL — SIGNIFICANT CHANGE UP (ref 80–100)
NRBC # BLD: 0 /100 WBCS — SIGNIFICANT CHANGE UP (ref 0–0)
PHOSPHATE SERPL-MCNC: 3.7 MG/DL — SIGNIFICANT CHANGE UP (ref 2.5–4.5)
PLATELET # BLD AUTO: 228 K/UL — SIGNIFICANT CHANGE UP (ref 150–400)
POTASSIUM SERPL-MCNC: 5.1 MMOL/L — SIGNIFICANT CHANGE UP (ref 3.5–5.3)
POTASSIUM SERPL-SCNC: 5.1 MMOL/L — SIGNIFICANT CHANGE UP (ref 3.5–5.3)
PROT SERPL-MCNC: 6.9 G/DL — SIGNIFICANT CHANGE UP (ref 6–8.3)
RBC # BLD: 4.19 M/UL — SIGNIFICANT CHANGE UP (ref 3.8–5.2)
RBC # FLD: 13.7 % — SIGNIFICANT CHANGE UP (ref 10.3–14.5)
SODIUM SERPL-SCNC: 142 MMOL/L — SIGNIFICANT CHANGE UP (ref 135–145)
WBC # BLD: 11.04 K/UL — HIGH (ref 3.8–10.5)
WBC # FLD AUTO: 11.04 K/UL — HIGH (ref 3.8–10.5)

## 2021-03-17 PROCEDURE — 93306 TTE W/DOPPLER COMPLETE: CPT | Mod: 26

## 2021-03-17 PROCEDURE — 99233 SBSQ HOSP IP/OBS HIGH 50: CPT

## 2021-03-17 PROCEDURE — 99232 SBSQ HOSP IP/OBS MODERATE 35: CPT

## 2021-03-17 PROCEDURE — 99233 SBSQ HOSP IP/OBS HIGH 50: CPT | Mod: GC

## 2021-03-17 RX ORDER — SODIUM CHLORIDE 9 MG/ML
1000 INJECTION, SOLUTION INTRAVENOUS ONCE
Refills: 0 | Status: COMPLETED | OUTPATIENT
Start: 2021-03-17 | End: 2021-03-17

## 2021-03-17 RX ADMIN — SENNA PLUS 2 TABLET(S): 8.6 TABLET ORAL at 22:01

## 2021-03-17 RX ADMIN — MEGESTROL ACETATE 400 MILLIGRAM(S): 40 SUSPENSION ORAL at 09:28

## 2021-03-17 RX ADMIN — Medication 1 DROP(S): at 05:41

## 2021-03-17 RX ADMIN — Medication 25 MILLIGRAM(S): at 09:28

## 2021-03-17 RX ADMIN — BRIMONIDINE TARTRATE 1 DROP(S): 2 SOLUTION/ DROPS OPHTHALMIC at 05:41

## 2021-03-17 RX ADMIN — SODIUM CHLORIDE 100 MILLILITER(S): 9 INJECTION, SOLUTION INTRAVENOUS at 11:04

## 2021-03-17 RX ADMIN — Medication 1 DROP(S): at 18:03

## 2021-03-17 RX ADMIN — LATANOPROST 1 DROP(S): 0.05 SOLUTION/ DROPS OPHTHALMIC; TOPICAL at 22:01

## 2021-03-17 RX ADMIN — BRIMONIDINE TARTRATE 1 DROP(S): 2 SOLUTION/ DROPS OPHTHALMIC at 18:03

## 2021-03-17 NOTE — PROGRESS NOTE ADULT - PROBLEM SELECTOR PLAN 1
progressive dementia, FAST7d. bedbound, total care and full feed. minimally verbal.  -assistance with all ADLs, skin care prn  -dispo : [facility]

## 2021-03-17 NOTE — PROGRESS NOTE ADULT - PROBLEM SELECTOR PLAN 7
geriatric female with multiple co morbidities including severe dementia admitted for workup of malignancy with two primaries  -unable to participate in GOC dt mentation  -GOC discussed as above  -pt is not comfort care   -qualifies for hospice  -dc back to [facility] +/- hospice  -will continue to follow with you

## 2021-03-17 NOTE — PROGRESS NOTE ADULT - SUBJECTIVE AND OBJECTIVE BOX
DAILY PROGRESS NOTE:    S: resting in bed, pleasant affect and awake but not oriented, is apparently baseline    O:    Vital Signs Last 24 Hrs  T(C): 36.4 (17 Mar 2021 06:02), Max: 36.7 (16 Mar 2021 11:10)  T(F): 97.6 (17 Mar 2021 06:02), Max: 98.1 (16 Mar 2021 11:10)  HR: 85 (17 Mar 2021 06:02) (70 - 85)  BP: 115/77 (17 Mar 2021 06:02) (115/77 - 138/72)  BP(mean): --  RR: 16 (17 Mar 2021 06:02) (16 - 16)  SpO2: 98% (17 Mar 2021 06:02) (98% - 99%)    I&O's Detail      Physical Exam:    Gen: NAD  Pulm: normal resp effort and excursion  Abd: soft, NT/ND  Ext: left knee contracted, resists extension; WWP bilaterally    LABS:                        12.4   10.90 )-----------( 242      ( 16 Mar 2021 08:16 )             38.8     03-16    140  |  111<H>  |  24<H>  ----------------------------<  71  4.9   |  18<L>  |  1.44<H>    Ca    9.9      16 Mar 2021 08:16  Phos  2.8     03-15  Mg     1.9     03-16    TPro  7.2  /  Alb  3.1<L>  /  TBili  0.8  /  DBili  x   /  AST  17  /  ALT  11  /  AlkPhos  55  03-16    PT/INR - ( 16 Mar 2021 08:16 )   PT: 15.3 sec;   INR: 1.29          PTT - ( 16 Mar 2021 08:16 )  PTT:30.1 sec  Urinalysis Basic - ( 15 Mar 2021 11:12 )    Color: Yellow / Appearance: Clear / S.025 / pH: x  Gluc: x / Ketone: NEGATIVE  / Bili: Negative / Urobili: 0.2 E.U./dL   Blood: x / Protein: NEGATIVE mg/dL / Nitrite: NEGATIVE   Leuk Esterase: NEGATIVE / RBC: x / WBC x   Sq Epi: x / Non Sq Epi: x / Bacteria: x        RADIOLOGY & ADDITIONAL STUDIES:

## 2021-03-17 NOTE — PROGRESS NOTE ADULT - PROBLEM SELECTOR PLAN 4
-hx 1 day of vaginal bleeding on admission  -CT Abdomen and Pelvis: 3/15 distention of the uterus with blood with internal enhancing masslike material spanning 5.0 x 5.9 cm.  -malignancy of rectum as above

## 2021-03-17 NOTE — CONSULT NOTE ADULT - SUBJECTIVE AND OBJECTIVE BOX
Patient is a 89y old  Female who presents with a chief complaint of postmenopausal vaginal bleeding (17 Mar 2021 08:34)       HPI:  89 y/o F with PMHx MR, dementia (AAOx1 at baseline), glaucoma (blind in both eyes), HTN and CKD IIIb (baseline Cr 1.4-1.5) who presents from [facility] for vaginal bleeding x 2 months. Patient is AAOx1-2 at baseline and cannot provide answers 2/2 dementia. In ED, partial history obtained from niece (Tania Kohler) who states that patient has had vaginal bleeding for more than 2 months and she told rehab center to bring patient into the hospital. The patient herself was not aware that she is bleeding from her vagina and denies any chest pain, dizziness, shortness of breath, abdominal pain, nausea, vomiting. Further history unable to be obtained from patient. However, she does state that there is no history of cancer in her family. Patient is on Eliquis for unknown reason, however medication was not on med rec from pharmacy.     ED Course:   Vitals: /85, P 71, R 18, T 100F, O2 100% RA  Labs: no leukocytosis or anemia, , chloride 113, BUN/Cr 34/1.69 (baseline 1.5-1.6), lactate 2.8, VBG normal, UA unremarkable   Imaging: CTAP w/oral and IV contrast: findings c/f uterine and rectal carcinoma. severe circumferential masslike mural thickening of rectum with discrete solid mass on R rectal wall (4.7x5.3 cm), pelvis shows myomatous uterus with distension of uterus with blood and internal enhancing masslike material spanning 5.0x5.9 cm. No adenopathy noted.    Consults: Gynecology and Surgery. ED contacted niece who states she would like everything done for patient.   Meds: 2L NS bolus  (15 Mar 2021 17:51)      PAST MEDICAL & SURGICAL HISTORY:  H/O blindness    Mitral regurgitation    Glaucoma    Shingles    H/O eye surgery        MEDICATIONS  (STANDING):  brimonidine 0.2% Ophthalmic Solution 1 Drop(s) Both EYES every 12 hours  latanoprost 0.005% Ophthalmic Solution 1 Drop(s) Both EYES at bedtime  megestrol Suspension 400 milliGRAM(s) Oral every 24 hours  metoprolol tartrate 25 milliGRAM(s) Oral every 12 hours  senna 2 Tablet(s) Oral every 24 hours  timolol 0.25% Solution 1 Drop(s) Both EYES every 12 hours    MEDICATIONS  (PRN):  acetaminophen  Suppository .. 325 milliGRAM(s) Rectal every 6 hours PRN Moderate Pain (4 - 6)      FAMILY HISTORY:  FH: arthritis  sister, grandmother        CBC Full  -  ( 16 Mar 2021 08:16 )  WBC Count : 10.90 K/uL  RBC Count : 4.04 M/uL  Hemoglobin : 12.4 g/dL  Hematocrit : 38.8 %  Platelet Count - Automated : 242 K/uL  Mean Cell Volume : 96.0 fl  Mean Cell Hemoglobin : 30.7 pg  Mean Cell Hemoglobin Concentration : 32.0 gm/dL  Auto Neutrophil # : x  Auto Lymphocyte # : x  Auto Monocyte # : x  Auto Eosinophil # : x  Auto Basophil # : x  Auto Neutrophil % : x  Auto Lymphocyte % : x  Auto Monocyte % : x  Auto Eosinophil % : x  Auto Basophil % : x      03-16    140  |  111<H>  |  24<H>  ----------------------------<  71  4.9   |  18<L>  |  1.44<H>    Ca    9.9      16 Mar 2021 08:16  Phos  2.8     03-15  Mg     1.9     -16    TPro  7.2  /  Alb  3.1<L>  /  TBili  0.8  /  DBili  x   /  AST  17  /  ALT  11  /  AlkPhos  55  03-16      Urinalysis Basic - ( 15 Mar 2021 11:12 )    Color: Yellow / Appearance: Clear / S.025 / pH: x  Gluc: x / Ketone: NEGATIVE  / Bili: Negative / Urobili: 0.2 E.U./dL   Blood: x / Protein: NEGATIVE mg/dL / Nitrite: NEGATIVE   Leuk Esterase: NEGATIVE / RBC: x / WBC x   Sq Epi: x / Non Sq Epi: x / Bacteria: x          Radiology:    < from: Xray Chest 1 View AP/PA (03.15.21 @ 11:35) >  EXAM:  XR CHEST AP OR PA 1V                          PROCEDURE DATE:  03/15/2021          INTERPRETATION:  Clinical history/reason for exam: Vaginal bleeding.    Frontal chest.    Comparison: 2020.    Findings/  impression: Heart size within normal limits, thoracic aortic calcification. No acute focal opacity. Bilateral chronic interstitial lung disease, unchanged.. Stable bony structures. Dextroscoliosis. Left breast punctate density.        < from: CT Abdomen and Pelvis w/ Oral Cont and w/ IV Cont (03.15.21 @ 13:35) >  EXAM:  CT ABDOMEN AND PELVIS OC IC                          PROCEDURE DATE:  03/15/2021          INTERPRETATION:  CLINICAL HISTORY: 89-year-old with generalized abdominal pain and vaginal bleeding.          FINDINGS: CT of the abdomen and pelvis was performed with the administration of contrast. Reconstructions were performed in the sagittal planes. No prior study available for comparison.    Evaluation of the lower chest demonstrates normal heart size. There is no pleural and no pericardial effusion. There is reticulation of the partially visualized lower lung parenchyma. Evaluation of the abdomen demonstrates that the liver, spleen, pancreas, gallbladder, bilateral adrenal glands and bilateral kidneys are normal in appearance aside from afew left renal cysts, nonspecific nodular adrenal nodularity and shrunken spleen. Evaluation of the gastrointestinal tract demonstrates severe circumferential masslike mural thickening of the rectum with a discrete solid mass on the right rectal wallmeasuring 4.7 x 5.3 cm. There is normal appearance of the appendix. 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 There is significant distention of the uterus with blood with internal enhancing masslike material spanning 5.0 x 5.9 cm. The bladder is unremarkable. No free fluid. No adenopathy. Moderate to severe vascular calcification. Small hiatal hernia. Opacified aspects of the portal, splenic, superior mesenteric vein, bilateral renal veins, IVC and bilateral iliac veins demonstrates no maria elena intraluminal thrombus. Osseous structures are unremarkable.          IMPRESSION:    Uterine carcinoma    Rectal carcinoma.                Vital Signs Last 24 Hrs  T(C): 36.4 (17 Mar 2021 06:02), Max: 36.7 (16 Mar 2021 11:10)  T(F): 97.6 (17 Mar 2021 06:02), Max: 98.1 (16 Mar 2021 11:10)  HR: 85 (17 Mar 2021 06:02) (70 - 85)  BP: 115/77 (17 Mar 2021 06:02) (115/77 - 138/72)  BP(mean): --  RR: 16 (17 Mar 2021 06:02) (16 - 16)  SpO2: 98% (17 Mar 2021 06:02) (98% - 99%)    REVIEW OF SYSTEMS: vaginal bleeding        Physical Exam: frail 89 yo AA/ woman lying in bed, minimally verbal, NAD    Head: normocephalic, atraumatic    Eyes: blind with cataracts    ENT: nasal discharge, uvula midline, no oropharyngeal erythema/exudate    Neck: supple, negative JVD, negative carotid bruits, no thyromegaly    Chest: CTA bilaterally, neg wheeze/ rhonchi/ rales/ crackles/ egophany    Cardiovascular: regular rate and rhythm, neg murmurs/rubs/gallops    Abdomen: soft, non distended, non tender to palpation in all 4 quadrants, negative rebound/guarding, normal bowel sounds    Extremities: WWP, neg cyanosis/clubbing/edema, negative calf tenderness to palpation, negative Orlin's sign    Musculoskeletal: LE contractures    Skin:      :     Neurologic Exam:    Alert and oriented to person, speech fluent w/o dysarthria, follows commands,     Motor Exam:    Right UE:            > 3/5    Left UE:               > 3/5      Right LE:           limited by ctx    Left LE:              limited by ctx                                  DTR:                  biceps/brachioradialis: equal bilaterally                                                      patella/ankle: equal bilaterally                                                                            Gait:  not tested        PM&R Impression:    1) deconditioned  2) LE contractures  3) functionally dependent in ADL's and bedbound  4) patient is at functional baseline    Plan: return to [facility]

## 2021-03-17 NOTE — CONSULT NOTE ADULT - CONSULT REQUESTED DATE/TIME
16-Mar-2021 13:07
17-Mar-2021 06:55
16-Mar-2021 08:08
15-Mar-2021 12:19
15-Mar-2021 15:30
16-Mar-2021 16:06

## 2021-03-17 NOTE — CONSULT NOTE ADULT - CONSULT REASON
rectal mass
Vaginal bleeding
Rectal Ca found on CT scan
Rehab evaluation
rectal mass
GOC for geriatric bedbound pt with newly diagnosed malignancy

## 2021-03-17 NOTE — PROGRESS NOTE ADULT - PROBLEM SELECTOR PLAN 6
Per nursing home, pt not on amlodipine. Currently prescribed metoprolol tartrate 25mg BID  - Continue home dose    #DVT PPx with Eliquis (?)  Per notes, patient may be on DVT PPx with Eliquis 2.5mg BID, however the medication is not on the patient's pharmacy list, and the niece denies that her aunt is on any anticoagulants. Additionally, US Duplex from last admission in 2020 shows US Duplex negative for any DVT. Spoke to provider at nursing home Dr. Rojo (cell [phone]) who confirmed elliquis 2.5mg BID since 7/2020 for primary ppx DVT as it is "easier to give a pill than a shot".   - Will hold elliquis in the setting of vaginal bleeding

## 2021-03-17 NOTE — PROGRESS NOTE ADULT - SUBJECTIVE AND OBJECTIVE BOX
GYN Progress Note   HD#3    Patient seen and examined at bedside. Denies any abdominal pain; AxO x1 and responds to simple questions with "yes" and "no." Nursing aid at bedside states she had scant vaginal bleeding earlier when cleaning. Patient unable to quantify any amount or aware of vaginal bleeding. Recently made DNR today by anselmo, patient unable to tolerate flex-sig today.     Vital Signs Last 24 Hours  T(C): 36.4 (03-17-21 @ 06:02), Max: 36.4 (03-17-21 @ 06:02)  HR: 85 (03-17-21 @ 06:02) (85 - 85)  BP: 115/77 (03-17-21 @ 06:02) (115/77 - 115/77)  RR: 16 (03-17-21 @ 06:02) (16 - 16)  SpO2: 98% (03-17-21 @ 06:02) (98% - 98%)    I&O's Summary      Physical Exam:  General: NAD  CV: RR, S1, S2  Lungs: CTA b/l, good air flow b/l   Abdomen: Soft, nondistended, nontender   : no bleeding on pad, Primafit to wall suction with rust tinged urine   Ext: warm and well perfused    Labs:                        12.9   11.04 )-----------( 228      ( 17 Mar 2021 09:00 )             39.8   baso x      eos x      imm gran x      lymph x      mono x      poly x                            12.4   10.90 )-----------( 242      ( 16 Mar 2021 08:16 )             38.8   baso x      eos x      imm gran x      lymph x      mono x      poly x                            12.1   11.21 )-----------( 216      ( 15 Mar 2021 22:10 )             38.3   baso x      eos x      imm gran x      lymph x      mono x      poly x                            13.9   10.25 )-----------( 254      ( 15 Mar 2021 10:41 )             43.8   baso 0.5    eos 1.6    imm gran 0.7    lymph 33.5   mono 5.9    poly 57.8       MEDICATIONS  (STANDING):  brimonidine 0.2% Ophthalmic Solution 1 Drop(s) Both EYES every 12 hours  latanoprost 0.005% Ophthalmic Solution 1 Drop(s) Both EYES at bedtime  megestrol Suspension 400 milliGRAM(s) Oral every 24 hours  metoprolol tartrate 25 milliGRAM(s) Oral every 12 hours  senna 2 Tablet(s) Oral every 24 hours  timolol 0.25% Solution 1 Drop(s) Both EYES every 12 hours    MEDICATIONS  (PRN):  acetaminophen  Suppository .. 325 milliGRAM(s) Rectal every 6 hours PRN Moderate Pain (4 - 6)

## 2021-03-17 NOTE — PROGRESS NOTE ADULT - PROBLEM SELECTOR PLAN 5
Na  > admission 148. Likely 2/2 poor PO intake  - Na 140 this AM  - Now resolved Na  > admission 148. Likely 2/2 poor PO intake  - Na 142 this AM  - Now resolved Na  > admission 148. Likely 2/2 poor PO intake  - Na 142 3/17  - Now resolved

## 2021-03-17 NOTE — PROGRESS NOTE ADULT - SUBJECTIVE AND OBJECTIVE BOX
SUBJECTIVE: pt seen and examined at bedside. agitated with PE, deferred for pt comfort. appears in no apparent distress. opens eyes to name, answers some simple questions. unable to provide comprehensive ROS    OVERNIGHT EVENTS: refusing VS, enema, combative    DNR in chart: No    ROS:  Pain: No    PEx:  T(C): 36.4 (03-17-21 @ 06:02), Max: 36.4 (03-17-21 @ 06:02)  HR: 85 (03-17-21 @ 06:02) (85 - 85)  BP: 115/77 (03-17-21 @ 06:02) (115/77 - 115/77)  RR: 16 (03-17-21 @ 06:02) (16 - 16)  SpO2: 98% (03-17-21 @ 06:02) (98% - 98%)  Wt(kg): --    General: Frail geriatric female, contracted, lying in bed NAD  HEENT: mucous membranes dry, bilateral pupils with cataracts, blind bilat, no oropharyngeal erythema, no mucosal bleeding, no thyromegaly.  Neck: supple  Cardiovascular: +S1/S2, RRR, no murmurs, gallops.  Respiratory: CTA B/L; no W/R/R. No increased work of breathing   Gastrointestinal: soft, NT/ND; no rebound tenderness or guarding, +BSx4  Extremities: WWP; no edema  MSK: +LE contracted. 	     ALLERGIES: No Known Allergies    OPIATE NAÏVE (Y/N): Y    MEDICATIONS: REVIEWED  MEDICATIONS  (STANDING):  brimonidine 0.2% Ophthalmic Solution 1 Drop(s) Both EYES every 12 hours  latanoprost 0.005% Ophthalmic Solution 1 Drop(s) Both EYES at bedtime  megestrol Suspension 400 milliGRAM(s) Oral every 24 hours  metoprolol tartrate 25 milliGRAM(s) Oral every 12 hours  senna 2 Tablet(s) Oral every 24 hours  timolol 0.25% Solution 1 Drop(s) Both EYES every 12 hours    MEDICATIONS  (PRN):  acetaminophen  Suppository .. 325 milliGRAM(s) Rectal every 6 hours PRN Moderate Pain (4 - 6)    LABS: REVIEWED  CBC:                        12.9   11.04 )-----------( 228      ( 17 Mar 2021 09:00 )             39.8     CMP:    03-17    142  |  113<H>  |  23  ----------------------------<  62<L>  5.1   |  18<L>  |  1.60<H>    Ca    10.0      17 Mar 2021 09:00  Phos  3.7     03-17  Mg     2.0     03-17    TPro  6.9  /  Alb  3.0<L>  /  TBili  0.9  /  DBili  x   /  AST  18  /  ALT  10  /  AlkPhos  51  03-17  Albumin, Serum: 3.0 g/dL (03-17-21 @ 09:00)      IMAGING: REVIEWED    Decision maker: The patient is UNable to participate in complex medical decision making conversations.   Legal surrogate:  -Tania Novoa Edita #[phone]  -per chart and primary team, Tania has not seen this pt for two years     ADVANCE DIRECTIVES  - discussed 3/16 with primary team, DNR DNI  - MOLST to be completed by primary team     GOALS OF CARE DISCUSSION  - Palliative care info/support provided to Tania      - Discussion with Tania that pt combative and refusing assessments, work up of malignancy   - Family considering transition to comfort care

## 2021-03-17 NOTE — PROGRESS NOTE ADULT - SUBJECTIVE AND OBJECTIVE BOX
SUBJECTIVE: Patient seen and examined at bedside. Patient agitated early this morning and refusing vitals and enemas.       Vital Signs Last 24 Hrs  T(C): 36.4 (17 Mar 2021 06:02), Max: 36.7 (16 Mar 2021 11:10)  T(F): 97.6 (17 Mar 2021 06:02), Max: 98.1 (16 Mar 2021 11:10)  HR: 85 (17 Mar 2021 06:02) (70 - 85)  BP: 115/77 (17 Mar 2021 06:02) (115/77 - 138/72)  BP(mean): --  RR: 16 (17 Mar 2021 06:02) (16 - 16)  SpO2: 98% (17 Mar 2021 06:02) (98% - 99%)    Physical Exam:  General: NAD  Pulmonary: Nonlabored breathing, no respiratory distress  Abdominal: soft, nondistended, nontender throughout with no rebound or guarding  Extremities: WWP, normal strength, no clubbing/cyanosis/edema  Neuro: A/O x1 (baseline)     Lines/drains/tubes:    I&O's Summary      LABS:                        12.4   10.90 )-----------( 242      ( 16 Mar 2021 08:16 )             38.8     03-16    140  |  111<H>  |  24<H>  ----------------------------<  71  4.9   |  18<L>  |  1.44<H>    Ca    9.9      16 Mar 2021 08:16  Phos  2.8     03-15  Mg     1.9     03-16    TPro  7.2  /  Alb  3.1<L>  /  TBili  0.8  /  DBili  x   /  AST  17  /  ALT  11  /  AlkPhos  55  03-16    PT/INR - ( 16 Mar 2021 08:16 )   PT: 15.3 sec;   INR: 1.29          PTT - ( 16 Mar 2021 08:16 )  PTT:30.1 sec  Urinalysis Basic - ( 15 Mar 2021 11:12 )    Color: Yellow / Appearance: Clear / S.025 / pH: x  Gluc: x / Ketone: NEGATIVE  / Bili: Negative / Urobili: 0.2 E.U./dL   Blood: x / Protein: NEGATIVE mg/dL / Nitrite: NEGATIVE   Leuk Esterase: NEGATIVE / RBC: x / WBC x   Sq Epi: x / Non Sq Epi: x / Bacteria: x      CAPILLARY BLOOD GLUCOSE        LIVER FUNCTIONS - ( 16 Mar 2021 08:16 )  Alb: 3.1 g/dL / Pro: 7.2 g/dL / ALK PHOS: 55 U/L / ALT: 11 U/L / AST: 17 U/L / GGT: x             RADIOLOGY & ADDITIONAL STUDIES:

## 2021-03-17 NOTE — GOALS OF CARE CONVERSATION - ADVANCED CARE PLANNING - CONVERSATION DETAILS
HCP Tania understands the poor prognosis of her Aunt's condition. Previously she was hesitant to make pt DNR/DNI as she wanted her to live as long as possible but now understands that the pt is suffering. HCP further understands the trauma associated with resuscitation and would prefer that she go peacefully without any breathing tubes. HCP is planning on coming to the hospital 3/18 at 1pm to have comfort care discussion.

## 2021-03-17 NOTE — PROGRESS NOTE ADULT - SUBJECTIVE AND OBJECTIVE BOX
GASTROENTEROLOGY PROGRESS NOTE  Patient seen and examined at bedside. Combative, agitated, and refusing enema this AM.     PERTINENT REVIEW OF SYSTEMS:  Unable to obtain 2/2 dementia.      Allergies    No Known Allergies    Intolerances      MEDICATIONS:  MEDICATIONS  (STANDING):  brimonidine 0.2% Ophthalmic Solution 1 Drop(s) Both EYES every 12 hours  latanoprost 0.005% Ophthalmic Solution 1 Drop(s) Both EYES at bedtime  megestrol Suspension 400 milliGRAM(s) Oral every 24 hours  metoprolol tartrate 25 milliGRAM(s) Oral every 12 hours  senna 2 Tablet(s) Oral every 24 hours  timolol 0.25% Solution 1 Drop(s) Both EYES every 12 hours    MEDICATIONS  (PRN):  acetaminophen  Suppository .. 325 milliGRAM(s) Rectal every 6 hours PRN Moderate Pain (4 - 6)    Vital Signs Last 24 Hrs  T(C): 36.4 (17 Mar 2021 06:02), Max: 36.4 (17 Mar 2021 06:02)  T(F): 97.6 (17 Mar 2021 06:02), Max: 97.6 (17 Mar 2021 06:02)  HR: 85 (17 Mar 2021 06:02) (85 - 85)  BP: 115/77 (17 Mar 2021 06:02) (115/77 - 115/77)  BP(mean): --  RR: 16 (17 Mar 2021 06:02) (16 - 16)  SpO2: 98% (17 Mar 2021 06:02) (98% - 98%)    PHYSICAL EXAM:    General: frail elderly woman lying in bed; in no acute distress  HEENT: MMM  Gastrointestinal: Soft non-tender non-distended; Normal bowel sounds; No hepatosplenomegaly. No rebound or guarding  Skin: Warm and dry. No obvious rash    LABS:                        12.9   11.04 )-----------( 228      ( 17 Mar 2021 09:00 )             39.8     03-17    142  |  113<H>  |  23  ----------------------------<  62<L>  5.1   |  18<L>  |  1.60<H>    Ca    10.0      17 Mar 2021 09:00  Phos  3.7     03-17  Mg     2.0     03-17    TPro  6.9  /  Alb  3.0<L>  /  TBili  0.9  /  DBili  x   /  AST  18  /  ALT  10  /  AlkPhos  51  03-17    PT/INR - ( 16 Mar 2021 08:16 )   PT: 15.3 sec;   INR: 1.29          PTT - ( 16 Mar 2021 08:16 )  PTT:30.1 sec                  RADIOLOGY & ADDITIONAL STUDIES:  Reviewed

## 2021-03-17 NOTE — PROGRESS NOTE ADULT - SUBJECTIVE AND OBJECTIVE BOX
INCOMPLETE***********    OVERNIGHT EVENTS:    SUBJECTIVE / INTERVAL HPI: Patient seen and examined at bedside.     VITAL SIGNS:  Vital Signs Last 24 Hrs  T(C): 36.4 (17 Mar 2021 06:02), Max: 36.7 (16 Mar 2021 11:10)  T(F): 97.6 (17 Mar 2021 06:02), Max: 98.1 (16 Mar 2021 11:10)  HR: 85 (17 Mar 2021 06:02) (70 - 85)  BP: 115/77 (17 Mar 2021 06:02) (115/77 - 138/72)  BP(mean): --  RR: 16 (17 Mar 2021 06:02) (16 - 16)  SpO2: 98% (17 Mar 2021 06:02) (98% - 99%)    PHYSICAL EXAM:    General: WDWN  HEENT: NC/AT; PERRL, anicteric sclera; MMM  Neck: supple  Cardiovascular: +S1/S2, RRR  Respiratory: CTA B/L; no W/R/R  Gastrointestinal: soft, NT/ND; +BSx4  Extremities: WWP; no edema, clubbing or cyanosis  Vascular: 2+ radial, DP/PT pulses B/L  Neurological: AAOx3; no focal deficits    MEDICATIONS:  MEDICATIONS  (STANDING):  brimonidine 0.2% Ophthalmic Solution 1 Drop(s) Both EYES every 12 hours  latanoprost 0.005% Ophthalmic Solution 1 Drop(s) Both EYES at bedtime  megestrol Suspension 400 milliGRAM(s) Oral every 24 hours  metoprolol tartrate 25 milliGRAM(s) Oral every 12 hours  senna 2 Tablet(s) Oral every 24 hours  timolol 0.25% Solution 1 Drop(s) Both EYES every 12 hours    MEDICATIONS  (PRN):  acetaminophen  Suppository .. 325 milliGRAM(s) Rectal every 6 hours PRN Moderate Pain (4 - 6)      ALLERGIES:  Allergies    No Known Allergies    Intolerances        LABS:                        12.4   10.90 )-----------( 242      ( 16 Mar 2021 08:16 )             38.8     03-16    140  |  111<H>  |  24<H>  ----------------------------<  71  4.9   |  18<L>  |  1.44<H>    Ca    9.9      16 Mar 2021 08:16  Phos  2.8     03-15  Mg     1.9     03-16    TPro  7.2  /  Alb  3.1<L>  /  TBili  0.8  /  DBili  x   /  AST  17  /  ALT  11  /  AlkPhos  55  03-16    PT/INR - ( 16 Mar 2021 08:16 )   PT: 15.3 sec;   INR: 1.29          PTT - ( 16 Mar 2021 08:16 )  PTT:30.1 sec  Urinalysis Basic - ( 15 Mar 2021 11:12 )    Color: Yellow / Appearance: Clear / S.025 / pH: x  Gluc: x / Ketone: NEGATIVE  / Bili: Negative / Urobili: 0.2 E.U./dL   Blood: x / Protein: NEGATIVE mg/dL / Nitrite: NEGATIVE   Leuk Esterase: NEGATIVE / RBC: x / WBC x   Sq Epi: x / Non Sq Epi: x / Bacteria: x      CAPILLARY BLOOD GLUCOSE          RADIOLOGY & ADDITIONAL TESTS: Reviewed. INCOMPLETE***********    OVERNIGHT EVENTS:    SUBJECTIVE / INTERVAL HPI: Patient seen and examined at bedside.     VITAL SIGNS:  Vital Signs Last 24 Hrs  T(C): 36.4 (17 Mar 2021 06:02), Max: 36.7 (16 Mar 2021 11:10)  T(F): 97.6 (17 Mar 2021 06:02), Max: 98.1 (16 Mar 2021 11:10)  HR: 85 (17 Mar 2021 06:02) (70 - 85)  BP: 115/77 (17 Mar 2021 06:02) (115/77 - 138/72)  BP(mean): --  RR: 16 (17 Mar 2021 06:02) (16 - 16)  SpO2: 98% (17 Mar 2021 06:02) (98% - 99%)    PHYSICAL EXAM:    General: WDWN  HEENT: NC/AT; PERRL, anicteric sclera; MMM  Neck: supple  Cardiovascular: +S1/S2, RRR  Respiratory: CTA B/L; no W/R/R  Gastrointestinal: soft, NT/ND; +BSx4  Extremities: WWP; no edema, clubbing or cyanosis  Vascular: 2+ radial, DP/PT pulses B/L  Neurological: AAOx3; no focal deficits    MEDICATIONS:  MEDICATIONS  (STANDING):  brimonidine 0.2% Ophthalmic Solution 1 Drop(s) Both EYES every 12 hours  lactated ringers Bolus 1000 milliLiter(s) IV Bolus once  latanoprost 0.005% Ophthalmic Solution 1 Drop(s) Both EYES at bedtime  megestrol Suspension 400 milliGRAM(s) Oral every 24 hours  metoprolol tartrate 25 milliGRAM(s) Oral every 12 hours  senna 2 Tablet(s) Oral every 24 hours  timolol 0.25% Solution 1 Drop(s) Both EYES every 12 hours    MEDICATIONS  (PRN):  acetaminophen  Suppository .. 325 milliGRAM(s) Rectal every 6 hours PRN Moderate Pain (4 - 6)      ALLERGIES:  Allergies    No Known Allergies    Intolerances        LABS:                        12.9   11.04 )-----------( 228      ( 17 Mar 2021 09:00 )             39.8     03-17    142  |  113<H>  |  23  ----------------------------<  62<L>  5.1   |  18<L>  |  1.60<H>    Ca    10.0      17 Mar 2021 09:00  Phos  3.7     03-17  Mg     2.0     03-17    TPro  6.9  /  Alb  3.0<L>  /  TBili  0.9  /  DBili  x   /  AST  18  /  ALT  10  /  AlkPhos  51  03-17    PT/INR - ( 16 Mar 2021 08:16 )   PT: 15.3 sec;   INR: 1.29          PTT - ( 16 Mar 2021 08:16 )  PTT:30.1 sec  Urinalysis Basic - ( 15 Mar 2021 11:12 )    Color: Yellow / Appearance: Clear / S.025 / pH: x  Gluc: x / Ketone: NEGATIVE  / Bili: Negative / Urobili: 0.2 E.U./dL   Blood: x / Protein: NEGATIVE mg/dL / Nitrite: NEGATIVE   Leuk Esterase: NEGATIVE / RBC: x / WBC x   Sq Epi: x / Non Sq Epi: x / Bacteria: x      CAPILLARY BLOOD GLUCOSE          RADIOLOGY & ADDITIONAL TESTS: Reviewed. INCOMPLETE***********    OVERNIGHT EVENTS: No acute overnight events.     SUBJECTIVE / INTERVAL HPI: Patient seen and examined at bedside. Per overnight nurse, pt was combative and noncompliant and was refusing vitals and medication. Pt is nonambulatory and incontinent, skin exam showed no pressure ulcers. Her last bowel movement was yesterday. This AM, pt is responding to some questions, but uncooperative with physical exam due to her mental status. She verbalized that she was sewing a dress for herself. She denies any pain. Was unable to get other subjective ROS.    VITAL SIGNS:  Vital Signs Last 24 Hrs  T(C): 36.4 (17 Mar 2021 06:02), Max: 36.7 (16 Mar 2021 11:10)  T(F): 97.6 (17 Mar 2021 06:02), Max: 98.1 (16 Mar 2021 11:10)  HR: 85 (17 Mar 2021 06:02) (70 - 85)  BP: 115/77 (17 Mar 2021 06:02) (115/77 - 138/72)  BP(mean): --  RR: 16 (17 Mar 2021 06:02) (16 - 16)  SpO2: 98% (17 Mar 2021 06:02) (98% - 99%)    PHYSICAL EXAM:    General: weak, frail thin elderly female, with significant LE contractures resting in bed.  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  Neck: supple  Cardiovascular: +S1/S2, RRR, no murmurs, rubs, or gallops.  Respiratory: CTA B/L; no W/R/R. No increased work of breathing.  Gastrointestinal: soft, NT/ND; +BSx4  Extremities: WWP; no edema, clubbing or cyanosis  Vascular: 2+ radial, DP/PT pulses B/L  Neurological: AAOx3; no focal deficits    MEDICATIONS:  MEDICATIONS  (STANDING):  brimonidine 0.2% Ophthalmic Solution 1 Drop(s) Both EYES every 12 hours  lactated ringers Bolus 1000 milliLiter(s) IV Bolus once  latanoprost 0.005% Ophthalmic Solution 1 Drop(s) Both EYES at bedtime  megestrol Suspension 400 milliGRAM(s) Oral every 24 hours  metoprolol tartrate 25 milliGRAM(s) Oral every 12 hours  senna 2 Tablet(s) Oral every 24 hours  timolol 0.25% Solution 1 Drop(s) Both EYES every 12 hours    MEDICATIONS  (PRN):  acetaminophen  Suppository .. 325 milliGRAM(s) Rectal every 6 hours PRN Moderate Pain (4 - 6)      ALLERGIES:  Allergies    No Known Allergies    Intolerances        LABS:                        12.9   11.04 )-----------( 228      ( 17 Mar 2021 09:00 )             39.8     03-17    142  |  113<H>  |  23  ----------------------------<  62<L>  5.1   |  18<L>  |  1.60<H>    Ca    10.0      17 Mar 2021 09:00  Phos  3.7     03-17  Mg     2.0     03-17    TPro  6.9  /  Alb  3.0<L>  /  TBili  0.9  /  DBili  x   /  AST  18  /  ALT  10  /  AlkPhos  51  03-17    PT/INR - ( 16 Mar 2021 08:16 )   PT: 15.3 sec;   INR: 1.29          PTT - ( 16 Mar 2021 08:16 )  PTT:30.1 sec  Urinalysis Basic - ( 15 Mar 2021 11:12 )    Color: Yellow / Appearance: Clear / S.025 / pH: x  Gluc: x / Ketone: NEGATIVE  / Bili: Negative / Urobili: 0.2 E.U./dL   Blood: x / Protein: NEGATIVE mg/dL / Nitrite: NEGATIVE   Leuk Esterase: NEGATIVE / RBC: x / WBC x   Sq Epi: x / Non Sq Epi: x / Bacteria: x      CAPILLARY BLOOD GLUCOSE          RADIOLOGY & ADDITIONAL TESTS: Reviewed. INCOMPLETE***********    OVERNIGHT EVENTS: No acute overnight events.     SUBJECTIVE / INTERVAL HPI: Patient seen and examined at bedside. Per overnight nurse, pt was combative and noncompliant and was refusing vitals and medication. Pt is nonambulatory and incontinent, skin exam showed no pressure ulcers. Her last bowel movement was yesterday. This AM, pt is responding to some questions, but uncooperative with physical exam due to her mental status. She verbalized that she was sewing a dress for herself. She denies any pain. Was unable to get other subjective ROS.    VITAL SIGNS:  Vital Signs Last 24 Hrs  T(C): 36.4 (17 Mar 2021 06:02), Max: 36.7 (16 Mar 2021 11:10)  T(F): 97.6 (17 Mar 2021 06:02), Max: 98.1 (16 Mar 2021 11:10)  HR: 85 (17 Mar 2021 06:02) (70 - 85)  BP: 115/77 (17 Mar 2021 06:02) (115/77 - 138/72)  BP(mean): --  RR: 16 (17 Mar 2021 06:02) (16 - 16)  SpO2: 98% (17 Mar 2021 06:02) (98% - 99%)    PHYSICAL EXAM:    General: weak, frail thin elderly female, with significant LE contractures resting in bed.  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  Neck: supple  Cardiovascular: +S1/S2, RRR, no murmurs, rubs, or gallops.  Respiratory: CTA B/L; no W/R/R. No increased work of breathing.  Gastrointestinal: soft, NT/ND; +BSx4  Extremities: WWP; no edema, clubbing or cyanosis  Neurological: AAOx3  MSK: +LE contracted    MEDICATIONS:  MEDICATIONS  (STANDING):  brimonidine 0.2% Ophthalmic Solution 1 Drop(s) Both EYES every 12 hours  lactated ringers Bolus 1000 milliLiter(s) IV Bolus once  latanoprost 0.005% Ophthalmic Solution 1 Drop(s) Both EYES at bedtime  megestrol Suspension 400 milliGRAM(s) Oral every 24 hours  metoprolol tartrate 25 milliGRAM(s) Oral every 12 hours  senna 2 Tablet(s) Oral every 24 hours  timolol 0.25% Solution 1 Drop(s) Both EYES every 12 hours    MEDICATIONS  (PRN):  acetaminophen  Suppository .. 325 milliGRAM(s) Rectal every 6 hours PRN Moderate Pain (4 - 6)      ALLERGIES:  Allergies    No Known Allergies    Intolerances        LABS:                        12.9   11.04 )-----------( 228      ( 17 Mar 2021 09:00 )             39.8     03-17    142  |  113<H>  |  23  ----------------------------<  62<L>  5.1   |  18<L>  |  1.60<H>    Ca    10.0      17 Mar 2021 09:00  Phos  3.7     03-17  Mg     2.0     03-17    TPro  6.9  /  Alb  3.0<L>  /  TBili  0.9  /  DBili  x   /  AST  18  /  ALT  10  /  AlkPhos  51  03-17    PT/INR - ( 16 Mar 2021 08:16 )   PT: 15.3 sec;   INR: 1.29          PTT - ( 16 Mar 2021 08:16 )  PTT:30.1 sec  Urinalysis Basic - ( 15 Mar 2021 11:12 )    Color: Yellow / Appearance: Clear / S.025 / pH: x  Gluc: x / Ketone: NEGATIVE  / Bili: Negative / Urobili: 0.2 E.U./dL   Blood: x / Protein: NEGATIVE mg/dL / Nitrite: NEGATIVE   Leuk Esterase: NEGATIVE / RBC: x / WBC x   Sq Epi: x / Non Sq Epi: x / Bacteria: x      CAPILLARY BLOOD GLUCOSE          RADIOLOGY & ADDITIONAL TESTS: Reviewed. OVERNIGHT EVENTS: No acute overnight events.     SUBJECTIVE / INTERVAL HPI: Patient seen and examined at bedside. Per overnight nurse, pt was combative, uncooperative refusing vitals, medication and enema. Last bowel movement was yesterday. This AM, pt is responding to some questions, but uncooperative with physical exam due to her mental status. She verbalized that she was sewing a dress for herself. She denies any pain. ROS limited by pts mental status.     VITAL SIGNS:  Vital Signs Last 24 Hrs  T(C): 36.4 (17 Mar 2021 06:02), Max: 36.7 (16 Mar 2021 11:10)  T(F): 97.6 (17 Mar 2021 06:02), Max: 98.1 (16 Mar 2021 11:10)  HR: 85 (17 Mar 2021 06:02) (70 - 85)  BP: 115/77 (17 Mar 2021 06:02) (115/77 - 138/72)  RR: 16 (17 Mar 2021 06:02) (16 - 16)  SpO2: 98% (17 Mar 2021 06:02) (98% - 99%)    PHYSICAL EXAM:    General: weak, frail thin elderly female, with significant LE contractures resting in bed.  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  Neck: supple  Cardiovascular: +S1/S2, RRR, no murmurs, rubs, or gallops.  Respiratory: CTA B/L; no W/R/R. No increased work of breathing.  Gastrointestinal: soft, NT/ND; +BSx4  : no blood visible on obdulio, primafit in place draining dark urine   Extremities: WWP; no edema, clubbing or cyanosis  Neurological: AAOx3  MSK: +LE contracted    MEDICATIONS:  MEDICATIONS  (STANDING):  brimonidine 0.2% Ophthalmic Solution 1 Drop(s) Both EYES every 12 hours  lactated ringers Bolus 1000 milliLiter(s) IV Bolus once  latanoprost 0.005% Ophthalmic Solution 1 Drop(s) Both EYES at bedtime  megestrol Suspension 400 milliGRAM(s) Oral every 24 hours  metoprolol tartrate 25 milliGRAM(s) Oral every 12 hours  senna 2 Tablet(s) Oral every 24 hours  timolol 0.25% Solution 1 Drop(s) Both EYES every 12 hours    MEDICATIONS  (PRN):  acetaminophen  Suppository .. 325 milliGRAM(s) Rectal every 6 hours PRN Moderate Pain (4 - 6)      ALLERGIES:  Allergies    No Known Allergies    Intolerances        LABS:                        12.9   11.04 )-----------( 228      ( 17 Mar 2021 09:00 )             39.8     03-17    142  |  113<H>  |  23  ----------------------------<  62<L>  5.1   |  18<L>  |  1.60<H>    Ca    10.0      17 Mar 2021 09:00  Phos  3.7     03-17  Mg     2.0     03-17    TPro  6.9  /  Alb  3.0<L>  /  TBili  0.9  /  DBili  x   /  AST  18  /  ALT  10  /  AlkPhos  51  03-17    PT/INR - ( 16 Mar 2021 08:16 )   PT: 15.3 sec;   INR: 1.29          PTT - ( 16 Mar 2021 08:16 )  PTT:30.1 sec  Urinalysis Basic - ( 15 Mar 2021 11:12 )    Color: Yellow / Appearance: Clear / S.025 / pH: x  Gluc: x / Ketone: NEGATIVE  / Bili: Negative / Urobili: 0.2 E.U./dL   Blood: x / Protein: NEGATIVE mg/dL / Nitrite: NEGATIVE   Leuk Esterase: NEGATIVE / RBC: x / WBC x   Sq Epi: x / Non Sq Epi: x / Bacteria: x      CAPILLARY BLOOD GLUCOSE          RADIOLOGY & ADDITIONAL TESTS: Reviewed.

## 2021-03-17 NOTE — PROGRESS NOTE ADULT - PROBLEM SELECTOR PLAN 10
F: d5 1/2 NS overnight (3/15), d/cain   E: monitor and replete  N: NPO after midnight pending possible biopsy 3/17  GI: none  DVT: SCDs bilaterally; Eliquis 2.5 BID held in the setting of vaginal bleeding    Code Status: Full Code   Dispo: Alejandro Mckeon F: d5 1/2 NS overnight (3/15), d/cain   E: monitor and replete  N: NPO d/cain  GI: none  DVT: SCDs bilaterally; Eliquis 2.5 BID held in the setting of vaginal bleeding    Code Status: Full Code   Dispo: Alejandro Mckeon F: d5 1/2 NS overnight (3/15), 1L NS (3/17)  E: monitor and replete  N: reg  GI: none  DVT: SCDs bilaterally; Eliquis 2.5 BID held in the setting of vaginal bleeding    Code Status: Full Code   Dispo: Alejandro Mckeon

## 2021-03-17 NOTE — PROGRESS NOTE ADULT - ASSESSMENT
89 y/o F with PMHx MR, dementia, blind bilat, HTN, on Eliquis for DVT ppx (?) resident of [facility] home admitted 3/15 for vaginal bleeding. Imaging on admission c/f uterine and rectal carcinoma. Pt  refusing workup. Palliative following to assist with GOC.

## 2021-03-17 NOTE — DISCHARGE NOTE PROVIDER - NSDCMRMEDTOKEN_GEN_ALL_CORE_FT
apixaban 2.5 mg oral tablet: 1 tab(s) orally 2 times a day  Colace 100 mg oral capsule: 1 cap(s) orally 2 times a day  megestrol: 10 milliliter(s) orally once a day  Metoprolol Tartrate 25 mg oral tablet: 1 tab(s) orally 2 times a day  Tylenol 325 mg oral tablet: 2 tab(s) orally every 6 hours, As Needed   brimonidine 0.2% ophthalmic solution: 1 drop(s) to each affected eye every 12 hours  Colace 100 mg oral capsule: 1 cap(s) orally 2 times a day  latanoprost 0.005% ophthalmic solution: 1 drop(s) to each affected eye once a day (at bedtime)  megestrol: 10 milliliter(s) orally once a day  Metoprolol Tartrate 25 mg oral tablet: 1 tab(s) orally 2 times a day  senna oral tablet: 2 tab(s) orally every 24 hours  timolol maleate 0.25% ophthalmic solution: 1 drop(s) to each affected eye every 12 hours  Tylenol 325 mg oral tablet: 2 tab(s) orally every 6 hours, As Needed

## 2021-03-17 NOTE — PROGRESS NOTE ADULT - PROBLEM/PLAN-8
this si 21 yo female presents to ed for evaluation . patient states that she is anxious. patient states that she is feeling palpitations and tingling on lips. patient has also had shortness of breath. patient started birth control pills 2 weeks ago. patient denies chest pain . DISPLAY PLAN FREE TEXT

## 2021-03-17 NOTE — PROGRESS NOTE ADULT - PROBLEM SELECTOR PLAN 3
CT Abdomen and Pelvis on 3/15: severe circumferential masslike mural thickening of the rectum with a discrete solid mass on the right rectal wallmeasuring 4.7 x 5.3 cm.   -Surgery 3/16: no acute surgical intervention indicated   -GI unable to flex sig with bx dt pt agitated, combative  -pt is unlikely candidate for disease directed therapy   -goc ongoing

## 2021-03-17 NOTE — PROGRESS NOTE ADULT - ASSESSMENT
Patient is a 89 year old female, brought in by ambulance from her nursing home after episode of vaginal bleeding. CT scan suspicious for possible uterine/rectal carcinoma. Patient is not able to give a history/consent to pelvic exam 2/2 dementia. Patient unable to tolerate flex-sig 3/17; decision was made by niece to make DNR. Gyn encourages a goals of care discussion; no inpatient needs at this time.     -No vaginal bleeding seen on exam today however unable to do proper  exam as pt's legs are contracted and she is AO x1. No intervention needed at the moment for bleeding.   -Please call gyn if pt becomes amenable for pelvic exam- if mass found on pelvic exam can biopsy tissue   -If vaginal bleeding becomes heavy >2 pads in 2 hours please pg gyn at [phone]   -Would transfuse < 7   -Gyn onc will follow if tissue diagnosis of gyn cancer made   -Benign gyn to follow    Sebastian Sung PGY2

## 2021-03-17 NOTE — CHART NOTE - NSCHARTNOTEFT_GEN_A_CORE
Pt refused vitals early on in evening.  is A&Ox1. Attempted to speak with her but pt still denied. AT 4 AM, saw pt at bedside, again refusing vitals and also refusing tap water enema. Attempted to take vitals myself and although not allowing me to take temperature, was able to obtain BP (162/60) HR 99. Pt refusing enema despite multiple attempts to explain why it is necessary

## 2021-03-17 NOTE — PROGRESS NOTE ADULT - PROBLEM SELECTOR PLAN 2
-GI consulted for possible colonoscopy to biopsy rectal mass; will further discuss goals of care with patient's family. Imaging showing right rectal wall mass concerning for colorectal malignancy  -Surgery consulted in ED - no acute surgical intervention at this time, recommend colonoscopy.  - Colorectal surg - no acute surgical intervention as mass is nonobstructive. Recommend pelvic MRI, colonoscopy w/ biopsy, tumor markers, heme/onc consult  - GI consulted rec enema x2 and flex sig. Pt refused enema this AM, cannot perform flex sig.   - Will f/u w/ HCP for more extensive GOC discussion  -F/u pelvic MRI

## 2021-03-17 NOTE — PROGRESS NOTE ADULT - PROBLEM SELECTOR PLAN 3
-Per Gyn, no indication for vaginal packing or transfusion at this time. Will continue to follow and attempt pelvic exam once patient amenable.   -Gyn onc will follow if there is tissue diagnosis proving malignancy.   -Maintain active type and screen

## 2021-03-17 NOTE — PROGRESS NOTE ADULT - PROBLEM SELECTOR PLAN 4
Baseline creatinine appears to be about 1.4-1.5, creatinine on admission 1.69, c/w CAYLA. Likely pre-renal in nature due to dehydration on exam.   -3/16 Cr 1.44  -Continue to trend renal function. If not improving can obtain urine studies Baseline creatinine appears to be about 1.4-1.5, creatinine on admission 1.69, c/w CAYLA. Likely pre-renal in nature due to dehydration on exam.   -3/16 Cr 1.44  -3/17 Cr 1.60  -Continue to trend renal function. If not improving can obtain urine studies Baseline creatinine appears to be about 1.4-1.5, creatinine on admission 1.69, c/w CAYLA. Likely pre-renal in nature due to dehydration on exam.   -3/16 Cr 1.44  -3/17 Cr 1.60  -Will bolus 1L NS  -Continue to trend renal function. If not improving can obtain urine studies

## 2021-03-17 NOTE — DISCHARGE NOTE PROVIDER - NSDCCPCAREPLAN_GEN_ALL_CORE_FT
PRINCIPAL DISCHARGE DIAGNOSIS  Diagnosis: Vaginal bleeding  Assessment and Plan of Treatment: You came to the hospital for evaluation of vaginal bleeding which you had been experiencing for about two months. While in the hospital we monitored your blood levels which showed that you were not losing a significant amount of blood. A CT scan of your abdomen and pelvis was performed which showed a mass in your uterus as well as a second mass in your rectum which are both concerning for cancer. You were seen by the gynecology team but they were unable to perform a pelvic exam.  Please call your doctor or return to the hospital if you experience any of the following: fevers (>100.4), dizziness or confusion, severe pain, worsening vaginal bleeding, or persistent nausea, vomiting, or diarrhea.      SECONDARY DISCHARGE DIAGNOSES  Diagnosis: Malignant neoplasm of uterus, unspecified site  Assessment and Plan of Treatment: While in the hospital we monitored your blood levels which showed that you were not losing a significant amount of blood. A CT scan of your abdomen and pelvis was performed which showed a mass in your uterus as well as a second mass in your rectum which are both concerning for cancer. You were seen by the gynecology team but they were unable to perform a pelvic exam.    Diagnosis: Malignant neoplasm of rectum  Assessment and Plan of Treatment: While in the hospital we monitored your blood levels which showed that you were not losing a significant amount of blood. A CT scan of your abdomen and pelvis was performed which showed a mass in your uterus as well as a second mass in your rectum which are both concerning for cancer. The surgery team was consutled and recommended that you undergo a colonoscopy so that the mass could be biopsied to confirm the diagnosis, however you refused this test.     PRINCIPAL DISCHARGE DIAGNOSIS  Diagnosis: Vaginal bleeding  Assessment and Plan of Treatment: You came to the hospital for evaluation of vaginal bleeding which you had been experiencing for about two months. While in the hospital we monitored your blood levels which showed that you were not losing a significant amount of blood. A CT scan of your abdomen and pelvis was performed which showed a mass in your uterus as well as a second mass in your rectum which are both concerning for cancer. Your home medication Eliquis was not given to you in the hospital and was not prescribed to you on discharge to prevent any further bleeding.  Please call your doctor or return to the hospital if you experience any of the following: fevers (>100.4), dizziness or confusion, severe pain, worsening vaginal bleeding, or persistent nausea, vomiting, or diarrhea.      SECONDARY DISCHARGE DIAGNOSES  Diagnosis: Malignant neoplasm of rectum  Assessment and Plan of Treatment: While in the hospital we monitored your blood levels which showed that you were not losing a significant amount of blood. A CT scan of your abdomen and pelvis was performed which showed a mass in your uterus as well as a second mass in your rectum which are both concerning for cancer. The decision was made to not do any further intervention and make you as comfortable as possible    Diagnosis: Malignant neoplasm of uterus, unspecified site  Assessment and Plan of Treatment: While in the hospital we monitored your blood levels which showed that you were not losing a significant amount of blood. A CT scan of your abdomen and pelvis was performed which showed a mass in your uterus as well as a second mass in your rectum which are both concerning for cancer. You were seen by the gynecology team but they were unable to perform a pelvic exam. The decision was made to not do any further intervention and make you as comfortable as possible.

## 2021-03-17 NOTE — PROGRESS NOTE ADULT - ASSESSMENT
89 year old female, dementia, MR, shingles and Glaucoma, brought in by ambulance from her nursing home after episode of vaginal bleeding at 8am. CT scan showed possible uterine and rectal Ca. AVSS, no bleeding at this time. Refusing enemas this morning. Slightly elevated CEA, rest of tumor markers wnl     - No acute surgical intervention indicated at this time.   - Flex sig with GI today, will follow results   - F/u MRI pelvis   - Ongoing GOC discussions; f/u palliative care recs    - Will follow   - Discussed with Dr. Milligan 89 year old female, dementia, MR, shingles and Glaucoma, brought in by ambulance from her nursing home after episode of vaginal bleeding at 8am. CT scan showed possible uterine and rectal Ca. AVSS, no bleeding at this time. Refusing enemas this morning. Slightly elevated CEA, rest of tumor markers wnl     - No acute surgical intervention indicated at this time.   - Unable to complete flex sig with GI today   - Ongoing GOC discussions; f/u palliative care recs    - Will sign off, please reconsult as needed  - Discussed with Dr. Milligan

## 2021-03-17 NOTE — PROGRESS NOTE ADULT - PROBLEM SELECTOR PLAN 1
Patient with vaginal bleeding 2 months per neice, hemodynamically stable. On exam in ED, patient noted to be bleeding at vaginal introitus, however DHIRAJ negative. CTA/P concerning for uterine and rectal carcinoma. Unclear history regarding history of cancer, symptoms including weight loss, fatigue etc. 2/2 patient's dementia (baseline A&Ox1).   -Gynecology evaluated patient in ED - no indication for transfusion or vaginal packing at this time. Gyn onc will follow if there is tissue diagnosis proving malignancy. Benign gyn will continue to follow and attempt pelvic exam once patient amenable.   -Per conversation with patient's niece, requesting full work up, full code   -Surgery consulted in ED - no acute surgical intervention at this time, recommend colonoscopy. Colorectal consulted  - Colorectal surg - no acute surgical intervention as mass is nonobstructive. Recommend pelvic MRI, colonoscopy w/ biopsy, tumor markers, heme/onc consult  -Consider GI consult in AM for colonoscopy for biopsy of rectal mass  -Palliative care consulted, f/u recs   -F/u tumor markers (CEA, CA-125, etc.) Patient with vaginal bleeding 2 months per niece, hemodynamically stable. On exam in ED, patient noted to be bleeding at vaginal introitus, however DHIRAJ negative. CTA/P concerning for uterine and rectal carcinoma. Unclear history regarding history of cancer, symptoms including weight loss, fatigue etc. 2/2 patient's dementia (baseline A&Ox1).   -Gynecology evaluated patient in ED - no indication for transfusion or vaginal packing at this time. Gyn onc will follow if there is tissue diagnosis proving malignancy.  - Benign gyn saw pt again 3/16, unable to perform pelvic exam due to contractures and pts mental status. Nothing to do at this time as bleeding is minimal. Contact GYN for vaginal bleeding >2 pads in 2hrs  -Per conversation with patient's niece, requesting full work up, full code   -CEA and prolactin mildly elevated, remainder of tumor markers unremarkable

## 2021-03-17 NOTE — CONSULT NOTE ADULT - REASON FOR ADMISSION
postmenopausal vaginal bleeding

## 2021-03-17 NOTE — PROGRESS NOTE ADULT - PROBLEM SELECTOR PLAN 7
No murmur auscultated on exam   -F/u TTE ordered      ADDENDUM:   #dementia: pt at baseline, will check b12/folate  -TSH WNL No murmur auscultated on exam   -F/u TTE ordered      #Dementia: pt at baseline  -TSH, B12 and folate WNL

## 2021-03-17 NOTE — PROGRESS NOTE ADULT - ASSESSMENT
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 GI consulted for rectal mass.     #Rectal mass  - most likely malignant in appearance  - refused enemas and was combative this AM  - there are certainly concerns over anesthesia for this patient; if she will be combative and refuse enemas, may need haldol to treat agitation and administer enemas  - furthermore, her flex sig can be done w/o sedation, but she would need to be cooperative, which may also require haldol or other sedating medications  - taken in aggregate, this would significantly increase maycol-procedural risk of a flex sig with biopsy  - would strongly urge GOC discussion as patient most likely not a candidate for treatment of malignancy regardless    Anai Romo MD  PGY-4, Gastroenterology Fellow  pager: [phone]

## 2021-03-17 NOTE — PROGRESS NOTE ADULT - PROBLEM SELECTOR PLAN 9
Spoke with niece, Ms. Tania Kohler on admission. Tania is the only living kin of patient, and therefore has been making all of her medical decisions for the past 5-6 years, despite not officially signing any form to be her HCP. Per discussion with Tania, the patient has no children and expressed to Tania recently that she wants to "live as long as possible".   -Risks and benefits of pursuing non-operative vs operative treatments for likely uterine and colorectal malignancy were outlined with Tania - she understands that her aunt may not be a surgical candidate and that she may want to simply rest and "let the cancer take over". However at this time, she wishes for her aunt to remain full code, for all extraordinary measures to be taken to keep her aunt alive, and to obtain tissue diagnosis of the mass so they can consider future treatment options.   -Tania would like to pursue colonoscopy for possible biopsy   -States that she may reconsider goals of care/code status depending on prognosis  -F/u palliative care consult Spoke with niece, Ms. Tania Kohler on admission. Tania is the only living kin of patient, and therefore has been making all of her medical decisions for the past 5-6 years, despite not officially signing any form to be her HCP. Per discussion with Tania, the patient has no children and expressed to Tania recently that she wants to "live as long as possible".   -Risks and benefits of pursuing non-operative vs operative treatments for likely uterine and colorectal malignancy were outlined with Tania - she understands that her aunt may not be a surgical candidate and that she may want to simply rest and "let the cancer take over". However at this time, she wishes for her aunt to remain full code, for all extraordinary measures to be taken to keep her aunt alive, and to obtain tissue diagnosis of the mass so they can consider future treatment options.   -Tania would like to pursue colonoscopy for possible biopsy   -States that she may reconsider goals of care/code status depending on prognosis  -Will speak to HCP again today regarding pt refusing enema, unable to obtain tissue diagnosis which is necessary for further treatment  -F/u GOC discussion w/ pall care

## 2021-03-17 NOTE — PROGRESS NOTE ADULT - ASSESSMENT
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 CTA/P c/f uterine and rectal carcinoma, admitted for further work up.

## 2021-03-17 NOTE — CONSULT NOTE ADULT - ASSESSMENT
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 CTA/P c/f uterine and rectal carcinoma, admitted for further work up.    Problem/Plan - 1:  ·  Problem: Malignant neoplasm of uterus, unspecified site.  Plan: Patient with vaginal bleeding 2 months per neice, hemodynamically stable. On exam in ED, patient noted to be bleeding at vaginal introitus, however DHIRAJ negative. CTA/P concerning for uterine and rectal carcinoma. Unclear history regarding history of cancer, symptoms including weight loss, fatigue etc. 2/2 patient's dementia (baseline A&Ox1).   -Gynecology evaluated patient in ED - no indication for transfusion or vaginal packing at this time. Gyn onc will follow if there is tissue diagnosis proving malignancy. Benign gyn will continue to follow and attempt pelvic exam once patient amenable.   -Per conversation with patient's niece, requesting full work up, full code   -Surgery consulted in ED - no acute surgical intervention at this time, recommend colonoscopy. Colorectal consulted  - Colorectal surg - no acute surgical intervention as mass is nonobstructive. Recommend pelvic MRI, colonoscopy w/ biopsy, tumor markers, heme/onc consult  -Consider GI consult in AM for colonoscopy for biopsy of rectal mass  -Palliative care consulted, f/u recs   -F/u tumor markers (CEA, CA-125, etc.).     Problem/Plan - 2:  ·  Problem: Rectal cancer.  Plan: -GI consulted for possible colonoscopy to biopsy rectal mass; will further discuss goals of care with patient's family.     Problem/Plan - 3:  ·  Problem: Vaginal bleeding.  Plan: -Per Gyn, no indication for vaginal packing or transfusion at this time. Will continue to follow and attempt pelvic exam once patient amenable.   -Gyn onc will follow if there is tissue diagnosis proving malignancy.   -Maintain active type and screen.     Problem/Plan - 4:  ·  Problem: Acute kidney injury superimposed on CKD.  Plan: Baseline creatinine appears to be about 1.4-1.5, creatinine on admission 1.69, c/w CAYLA. Likely pre-renal in nature due to dehydration on exam.   -3/16 Cr 1.44  -Continue to trend renal function. If not improving can obtain urine studies.     Problem/Plan - 5:  ·  Problem: Hypernatremia.  Plan: Na  > admission 148. Likely 2/2 poor PO intake  - Na 140 this AM  - Now resolved.     Problem/Plan - 6:  Problem: Hypertension. Plan: Per nursing home, pt not on amlodipine. Currently prescribed metoprolol tartrate 25mg BID  - Continue home dose    #DVT PPx with Eliquis (?)  Per notes, patient may be on DVT PPx with Eliquis 2.5mg BID, however the medication is not on the patient's pharmacy list, and the niece denies that her aunt is on any anticoagulants. Additionally, US Duplex from last admission in 2020 shows US Duplex negative for any DVT. Spoke to provider at nursing home Dr. Rojo (cell [phone]) who confirmed elliquis 2.5mg BID since 7/2020 for primary ppx DVT as it is "easier to give a pill than a shot".   - Will hold elliquis in the setting of vaginal bleeding.    Problem/Plan - 7:  ·  Problem: Mitral valve insufficiency, unspecified etiology.  Plan: No murmur auscultated on exam   -F/u TTE ordered      ADDENDUM:   #dementia: pt at baseline, will check b12/folate  -TSH WNL.     Problem/Plan - 8:  ·  Problem: H/O blindness.  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   -Continue combigan and latanoprost eyedrops inpatient.     Problem/Plan - 9:  ·  Problem: Goals of care, counseling/discussion.  Plan: Spoke with niece, Ms. Tania Kohler on admission. Tania is the only living kin of patient, and therefore has been making all of her medical decisions for the past 5-6 years, despite not officially signing any form to be her HCP. Per discussion with Tania, the patient has no children and expressed to Tania recently that she wants to "live as long as possible".   -Risks and benefits of pursuing non-operative vs operative treatments for likely uterine and colorectal malignancy were outlined with Tania - she understands that her aunt may not be a surgical candidate and that she may want to simply rest and "let the cancer take over". However at this time, she wishes for her aunt to remain full code, for all extraordinary measures to be taken to keep her aunt alive, and to obtain tissue diagnosis of the mass so they can consider future treatment options.   -Tania would like to pursue colonoscopy for possible biopsy   -States that she may reconsider goals of care/code status depending on prognosis  -F/u palliative care consult.     Problem/Plan - 10:  Problem: Nutrition, metabolism, and development symptoms. Plan; F: d5 1/2 NS overnight (3/15), d/cain   E: monitor and replete  N: NPO after midnight pending possible biopsy 3/17  GI: none  DVT: SCDs bilaterally; Eliquis 2.5 BID held in the setting of vaginal bleeding    Code Status: Full Code   Dispo: [facility].

## 2021-03-17 NOTE — PROGRESS NOTE ADULT - PROBLEM SELECTOR PLAN 5
-pt unable to participate in GOC due to baseline mentation  -discussion with Tania and primary team. explained pt's combative behaviors and subsequent inability to further work up malignancy  -explained that pt is unlikely candidate for disease directed therapy  -niece considering transition to comfort care

## 2021-03-17 NOTE — PROGRESS NOTE ADULT - PROBLEM SELECTOR PLAN 8
Patient has history of blindness in both eyes, s/p glaucoma surgery.   -Continue combigan and latanoprost eyedrops inpatient

## 2021-03-18 LAB
ANION GAP SERPL CALC-SCNC: 13 MMOL/L — SIGNIFICANT CHANGE UP (ref 5–17)
BASOPHILS # BLD AUTO: 0.04 K/UL — SIGNIFICANT CHANGE UP (ref 0–0.2)
BASOPHILS NFR BLD AUTO: 0.4 % — SIGNIFICANT CHANGE UP (ref 0–2)
BUN SERPL-MCNC: 29 MG/DL — HIGH (ref 7–23)
CALCIUM SERPL-MCNC: 10.2 MG/DL — SIGNIFICANT CHANGE UP (ref 8.4–10.5)
CHLORIDE SERPL-SCNC: 111 MMOL/L — HIGH (ref 96–108)
CO2 SERPL-SCNC: 17 MMOL/L — LOW (ref 22–31)
CREAT SERPL-MCNC: 1.69 MG/DL — HIGH (ref 0.5–1.3)
EOSINOPHIL # BLD AUTO: 0.18 K/UL — SIGNIFICANT CHANGE UP (ref 0–0.5)
EOSINOPHIL NFR BLD AUTO: 1.6 % — SIGNIFICANT CHANGE UP (ref 0–6)
GLUCOSE SERPL-MCNC: 56 MG/DL — LOW (ref 70–99)
HCT VFR BLD CALC: 40.9 % — SIGNIFICANT CHANGE UP (ref 34.5–45)
HGB BLD-MCNC: 12.8 G/DL — SIGNIFICANT CHANGE UP (ref 11.5–15.5)
IMM GRANULOCYTES NFR BLD AUTO: 0.7 % — SIGNIFICANT CHANGE UP (ref 0–1.5)
LYMPHOCYTES # BLD AUTO: 3.36 K/UL — HIGH (ref 1–3.3)
LYMPHOCYTES # BLD AUTO: 30.5 % — SIGNIFICANT CHANGE UP (ref 13–44)
MAGNESIUM SERPL-MCNC: 2 MG/DL — SIGNIFICANT CHANGE UP (ref 1.6–2.6)
MCHC RBC-ENTMCNC: 29.9 PG — SIGNIFICANT CHANGE UP (ref 27–34)
MCHC RBC-ENTMCNC: 31.3 GM/DL — LOW (ref 32–36)
MCV RBC AUTO: 95.6 FL — SIGNIFICANT CHANGE UP (ref 80–100)
MONOCYTES # BLD AUTO: 0.65 K/UL — SIGNIFICANT CHANGE UP (ref 0–0.9)
MONOCYTES NFR BLD AUTO: 5.9 % — SIGNIFICANT CHANGE UP (ref 2–14)
NEUTROPHILS # BLD AUTO: 6.72 K/UL — SIGNIFICANT CHANGE UP (ref 1.8–7.4)
NEUTROPHILS NFR BLD AUTO: 60.9 % — SIGNIFICANT CHANGE UP (ref 43–77)
NRBC # BLD: 0 /100 WBCS — SIGNIFICANT CHANGE UP (ref 0–0)
PHOSPHATE SERPL-MCNC: 3.6 MG/DL — SIGNIFICANT CHANGE UP (ref 2.5–4.5)
PLATELET # BLD AUTO: 228 K/UL — SIGNIFICANT CHANGE UP (ref 150–400)
POTASSIUM SERPL-MCNC: 4.9 MMOL/L — SIGNIFICANT CHANGE UP (ref 3.5–5.3)
POTASSIUM SERPL-SCNC: 4.9 MMOL/L — SIGNIFICANT CHANGE UP (ref 3.5–5.3)
RBC # BLD: 4.28 M/UL — SIGNIFICANT CHANGE UP (ref 3.8–5.2)
RBC # FLD: 13.5 % — SIGNIFICANT CHANGE UP (ref 10.3–14.5)
SODIUM SERPL-SCNC: 141 MMOL/L — SIGNIFICANT CHANGE UP (ref 135–145)
WBC # BLD: 11.03 K/UL — HIGH (ref 3.8–10.5)
WBC # FLD AUTO: 11.03 K/UL — HIGH (ref 3.8–10.5)

## 2021-03-18 PROCEDURE — 99233 SBSQ HOSP IP/OBS HIGH 50: CPT | Mod: GC

## 2021-03-18 PROCEDURE — 99497 ADVNCD CARE PLAN 30 MIN: CPT | Mod: GC,25

## 2021-03-18 PROCEDURE — 99233 SBSQ HOSP IP/OBS HIGH 50: CPT

## 2021-03-18 RX ORDER — METOPROLOL TARTRATE 50 MG
25 TABLET ORAL EVERY 12 HOURS
Refills: 0 | Status: CANCELLED | OUTPATIENT
Start: 2021-03-18 | End: 2021-03-23

## 2021-03-18 RX ORDER — APIXABAN 2.5 MG/1
1 TABLET, FILM COATED ORAL
Qty: 0 | Refills: 0 | DISCHARGE

## 2021-03-18 RX ORDER — LATANOPROST 0.05 MG/ML
1 SOLUTION/ DROPS OPHTHALMIC; TOPICAL
Qty: 0 | Refills: 0 | DISCHARGE
Start: 2021-03-18

## 2021-03-18 RX ORDER — SENNA PLUS 8.6 MG/1
2 TABLET ORAL
Qty: 0 | Refills: 0 | DISCHARGE
Start: 2021-03-18

## 2021-03-18 RX ORDER — BRIMONIDINE TARTRATE 2 MG/MG
1 SOLUTION/ DROPS OPHTHALMIC
Qty: 0 | Refills: 0 | DISCHARGE
Start: 2021-03-18

## 2021-03-18 RX ORDER — TIMOLOL 0.5 %
1 DROPS OPHTHALMIC (EYE)
Qty: 0 | Refills: 0 | DISCHARGE
Start: 2021-03-18

## 2021-03-18 RX ORDER — HYDROMORPHONE HYDROCHLORIDE 2 MG/ML
0.5 INJECTION INTRAMUSCULAR; INTRAVENOUS; SUBCUTANEOUS ONCE
Refills: 0 | Status: DISCONTINUED | OUTPATIENT
Start: 2021-03-18 | End: 2021-03-18

## 2021-03-18 RX ADMIN — MEGESTROL ACETATE 400 MILLIGRAM(S): 40 SUSPENSION ORAL at 07:15

## 2021-03-18 RX ADMIN — Medication 1 DROP(S): at 17:56

## 2021-03-18 RX ADMIN — SENNA PLUS 2 TABLET(S): 8.6 TABLET ORAL at 21:31

## 2021-03-18 RX ADMIN — Medication 25 MILLIGRAM(S): at 07:15

## 2021-03-18 RX ADMIN — HYDROMORPHONE HYDROCHLORIDE 0.5 MILLIGRAM(S): 2 INJECTION INTRAMUSCULAR; INTRAVENOUS; SUBCUTANEOUS at 22:14

## 2021-03-18 RX ADMIN — Medication 1 DROP(S): at 06:07

## 2021-03-18 RX ADMIN — BRIMONIDINE TARTRATE 1 DROP(S): 2 SOLUTION/ DROPS OPHTHALMIC at 06:08

## 2021-03-18 RX ADMIN — BRIMONIDINE TARTRATE 1 DROP(S): 2 SOLUTION/ DROPS OPHTHALMIC at 17:56

## 2021-03-18 RX ADMIN — HYDROMORPHONE HYDROCHLORIDE 0.5 MILLIGRAM(S): 2 INJECTION INTRAMUSCULAR; INTRAVENOUS; SUBCUTANEOUS at 22:30

## 2021-03-18 RX ADMIN — LATANOPROST 1 DROP(S): 0.05 SOLUTION/ DROPS OPHTHALMIC; TOPICAL at 21:31

## 2021-03-18 NOTE — PROGRESS NOTE ADULT - PROBLEM SELECTOR PLAN 2
Imaging showing right rectal wall mass concerning for colorectal malignancy  -Surgery consulted in ED - no acute surgical intervention at this time, recommend colonoscopy.  - Colorectal surg - no acute surgical intervention as mass is nonobstructive. Recommend pelvic MRI, colonoscopy w/ biopsy, tumor markers, heme/onc consult  - GI consulted rec enema x2 and flex sig. Pt refused enema this AM, cannot perform flex sig.   - Will f/u w/ HCP for more extensive GOC discussion  -F/u pelvic MRI Imaging showing right rectal wall mass concerning for colorectal malignancy  -Surgery consulted in ED - no acute surgical intervention at this time, recommend colonoscopy.  - Colorectal surg - no acute surgical intervention as mass is nonobstructive. Recommend pelvic MRI, colonoscopy w/ biopsy, tumor markers, heme/onc consult. Will sign off, reconsult as needed   - GI consulted rec enema x2 and flex sig. Pt refused enema, cannot perform flex sig. GI to sign off, reconsult if family still requesting workup after GOC discussion this afternoon  - Per conversation with patient's HCP (niece), pt is now DNR/DNI. Niece will be coming in today at 1pm for further GOC discussion  - Per pall care, pt qualifies for hospice care  - Pelvic MRI cancelled pending GOC discussion

## 2021-03-18 NOTE — PROGRESS NOTE ADULT - PROBLEM SELECTOR PLAN 7
No murmur auscultated on exam   -F/u TTE ordered      #Dementia: pt at baseline  -TSH, B12 and folate WNL No murmur auscultated on exam   -TTE results showed mild mitral regurgitation      #Dementia: pt at baseline  -TSH, B12 and folate WNL

## 2021-03-18 NOTE — PROGRESS NOTE ADULT - CONVERSATION DETAILS
In addition to the EM visit, an advance care planning meeting was performed  Start time: 100 p  End time: 130 p  Total time: 30 min  A face to face meeting to discuss advance care planning was held today regarding: PATEL MACIAS  Primary decision maker: anselmo Marin    Pts chart and PMHx thoroughly reviewed. Pt unable to participate in GOC conversations dt cognition. Discussion held at bedside with pt's anselmo Tania. Discussed pt's hospital course, newly diagnosed malignancy, and pts refusal of some medical interventions necessary for workup. We discussed that pt would likely be a poor candidate for disease directed therapy. Electing to transition to care with exclusive focus on comfort and maximize quality of pts life. Goal is pt's comfort.     Discussed with Sawyer Singh, who will assist with DC back to [facility] with hospice services.

## 2021-03-18 NOTE — PHYSICAL THERAPY INITIAL EVALUATION ADULT - PERTINENT HX OF CURRENT PROBLEM, REHAB EVAL
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 CTA/P c/f uterine and rectal carcinoma, admitted for further work up.

## 2021-03-18 NOTE — PROGRESS NOTE ADULT - PROBLEM SELECTOR PLAN 7
geriatric female with multiple co morbidities including severe dementia admitted for workup of malignancy with two primaries  -unable to participate in GOC dt mentation  -GOC discussed as above  -dc back to [facility] with hospice. Palliative TAM, Sawyer to assist  -will continue to follow with you

## 2021-03-18 NOTE — PROGRESS NOTE ADULT - PROBLEM SELECTOR PLAN 9
Spoke with niece, Ms. Tania Kohler on admission. Tania is the only living kin of patient, and therefore has been making all of her medical decisions for the past 5-6 years, despite not officially signing any form to be her HCP. Per discussion with Tania, the patient has no children and expressed to Tania recently that she wants to "live as long as possible".   -Risks and benefits of pursuing non-operative vs operative treatments for likely uterine and colorectal malignancy were outlined with Tania - she understands that her aunt may not be a surgical candidate and that she may want to simply rest and "let the cancer take over". However at this time, she wishes for her aunt to remain full code, for all extraordinary measures to be taken to keep her aunt alive, and to obtain tissue diagnosis of the mass so they can consider future treatment options.   -Tania would like to pursue colonoscopy for possible biopsy   -States that she may reconsider goals of care/code status depending on prognosis  -Will speak to HCP again today regarding pt refusing enema, unable to obtain tissue diagnosis which is necessary for further treatment  -F/u GOC discussion w/ pall care Spoke with niece, Ms. Tania Kohler on admission. Tania is the only living kin of patient, and therefore has been making all of her medical decisions for the past 5-6 years, despite not officially signing any form to be her HCP. Per discussion with Tania, the patient has no children and expressed to Tania recently that she wants to "live as long as possible".   -Risks and benefits of pursuing non-operative vs operative treatments for likely uterine and colorectal malignancy were outlined with Tania - she understands that her aunt may not be a surgical candidate and that she may want to simply rest and "let the cancer take over". However at this time, she wishes for her aunt to remain full code, for all extraordinary measures to be taken to keep her aunt alive, and to obtain tissue diagnosis of the mass so they can consider future treatment options.   -Tania would like to pursue colonoscopy for possible biopsy   -States that she may reconsider goals of care/code status depending on prognosis  -Per conversation with HCP, pt made DNR/DNI on 3/17  -Niece to come to hospital this afternoon, further treatment pending further GOC discussion

## 2021-03-18 NOTE — PROGRESS NOTE ADULT - SUBJECTIVE AND OBJECTIVE BOX
DAILY PROGRESS NOTE:    S: Pt refused/not cooperating w/ sigmoidoscopy yesterday. Currently holding off. Having bowel fxn.    O:    Vital Signs Last 24 Hrs  T(C): 37.2 (18 Mar 2021 05:53), Max: 37.2 (18 Mar 2021 05:53)  T(F): 98.9 (18 Mar 2021 05:53), Max: 98.9 (18 Mar 2021 05:53)  HR: 83 (18 Mar 2021 05:53) (71 - 83)  BP: 153/97 (18 Mar 2021 05:53) (95/64 - 153/97)  BP(mean): --  RR: 18 (18 Mar 2021 05:53) (18 - 18)  SpO2: 100% (18 Mar 2021 05:53) (99% - 100%)    I&O's Detail    17 Mar 2021 07:01  -  18 Mar 2021 07:00  --------------------------------------------------------  IN:    Lactated Ringers Bolus: 400 mL  Total IN: 400 mL    OUT:  Total OUT: 0 mL    Total NET: 400 mL          Physical Exam:    Gen: NAD  Pulm: normal resp effort and excursion  Abd: soft, NT/ND      LABS:                        12.8   11.03 )-----------( 228      ( 18 Mar 2021 08:51 )             40.9     03-18    141  |  111<H>  |  29<H>  ----------------------------<  56<L>  4.9   |  17<L>  |  1.69<H>    Ca    10.2      18 Mar 2021 08:51  Phos  3.6     03-18  Mg     2.0     03-18    TPro  6.9  /  Alb  3.0<L>  /  TBili  0.9  /  DBili  x   /  AST  18  /  ALT  10  /  AlkPhos  51  03-17          RADIOLOGY & ADDITIONAL STUDIES:

## 2021-03-18 NOTE — PROGRESS NOTE ADULT - PROBLEM SELECTOR PLAN 1
progressive dementia, FAST7d. bedbound, total care and full feed. minimally verbal.  -assistance with all ADLs, skin care prn  -[facility]

## 2021-03-18 NOTE — PROGRESS NOTE ADULT - PROBLEM SELECTOR PLAN 4
Baseline creatinine appears to be about 1.4-1.5, creatinine on admission 1.69, c/w CAYLA. Likely pre-renal in nature due to dehydration on exam.   -3/16 Cr 1.44  -3/17 Cr 1.60  -Will bolus 1L NS  -Continue to trend renal function. If not improving can obtain urine studies Baseline creatinine appears to be about 1.4-1.5, creatinine on admission 1.69, c/w CAYLA. Likely pre-renal in nature due to dehydration on exam.   -3/16 Cr 1.44  -3/17 Cr 1.60  -3/18 Cr 1.69  -Pt received 500 cc of lactated ringers  -Continue to trend renal function. If not improving can obtain urine studies Baseline creatinine appears to be about 1.4-1.5, creatinine on admission 1.69, c/w CAYLA. Likely pre-renal in nature due to dehydration on exam.   -3/16 Cr 1.44  -3/17 Cr 1.60, s/p 500 cc of lactated ringers  -3/18 Cr 1.69  -Continue to trend renal function. If not improving can obtain urine studies

## 2021-03-18 NOTE — PROGRESS NOTE ADULT - PROBLEM SELECTOR PLAN 5
-pt unable to participate in GOC due to baseline mentation  -discussion as above  -comfort care  -mews exempt  -NO bipap NO hiflo  -dc lab draws  -VS q shift  [facility]

## 2021-03-18 NOTE — PROGRESS NOTE ADULT - ASSESSMENT
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 CTA/P c/f uterine and rectal carcinoma, admitted for further work up.

## 2021-03-18 NOTE — PHYSICAL THERAPY INITIAL EVALUATION ADULT - ADDITIONAL COMMENTS
Pt unable to provide PLOF 2/2 AMS. Per chart and , pt lives in [facility] nursing home and was not ambulatory PTA.

## 2021-03-18 NOTE — PROGRESS NOTE ADULT - PROBLEM SELECTOR PLAN 3
CT Abdomen and Pelvis on 3/15: severe circumferential masslike mural thickening of the rectum with a discrete solid mass on the right rectal wallmeasuring 4.7 x 5.3 cm.   -Surgery 3/16: no acute surgical intervention indicated   -GI unable to flex sig with bx dt pt agitated, combative  -pt is unlikely candidate for disease directed therapy   -GOC discussed as above: comfort care

## 2021-03-18 NOTE — PROGRESS NOTE ADULT - PROBLEM SELECTOR PLAN 10
F: d5 1/2 NS overnight (3/15), 1L NS (3/17)  E: monitor and replete  N: reg  GI: none  DVT: SCDs bilaterally; Eliquis 2.5 BID held in the setting of vaginal bleeding    Code Status: Full Code   Dispo: [facility] F: d5 1/2 NS overnight (3/15), 1L NS (3/17)  E: monitor and replete  N: reg  GI: none  DVT: SCDs bilaterally; Eliquis 2.5 BID held in the setting of vaginal bleeding    Code Status: DNR/DNI  Dispo: [facility]

## 2021-03-18 NOTE — PROGRESS NOTE ADULT - ASSESSMENT
89 year old female, dementia, MR, shingles and Glaucoma, brought in by ambulance from her nursing home after episode of vaginal bleeding at 8am. CT scan showed possible uterine and rectal Ca. DHIRAJ without blood. Colorectal surgery consulted for management of possible rectal malignancy.     - pt currently refusing sigmoidoscopy  - pt having bowel fxn  - ongoing GOC discussion given pts current quality of life and likely advanced disease  - surgery currently not indicated, team 1c will sign off, please re-consult if there is a surgical question  - discussed w/ Dr. Walton

## 2021-03-18 NOTE — PROGRESS NOTE ADULT - PROBLEM SELECTOR PLAN 5
Na  > admission 148. Likely 2/2 poor PO intake  - Na 142 3/17  - Now resolved Na  > admission 148. Likely 2/2 poor PO intake  - Na 142 3/17  - Na 141 3/18  - Now resolved

## 2021-03-18 NOTE — PROGRESS NOTE ADULT - PROBLEM SELECTOR PLAN 6
Per nursing home, pt not on amlodipine. Currently prescribed metoprolol tartrate 25mg BID  - Continue home dose    #DVT PPx with Eliquis (?)  Per notes, patient may be on DVT PPx with Eliquis 2.5mg BID, however the medication is not on the patient's pharmacy list, and the niece denies that her aunt is on any anticoagulants. Additionally, US Duplex from last admission in 2020 shows US Duplex negative for any DVT. Spoke to provider at nursing home Dr. Rojo (cell [phone]) who confirmed elliquis 2.5mg BID since 7/2020 for primary ppx DVT as it is "easier to give a pill than a shot".   - Will hold elliquis in the setting of vaginal bleeding Per nursing home, pt not on amlodipine. Currently prescribed metoprolol tartrate 25mg BID  - Held PM dose 3/17 for hypotension, prescription reordered w/ home parameters  - Continue home dose    #DVT PPx with Eliquis (?)  Per notes, patient may be on DVT PPx with Eliquis 2.5mg BID, however the medication is not on the patient's pharmacy list, and the niece denies that her aunt is on any anticoagulants. Additionally, US Duplex from last admission in 2020 shows US Duplex negative for any DVT. Spoke to provider at nursing home Dr. Rojo (cell [phone]) who confirmed elliquis 2.5mg BID since 7/2020 for primary ppx DVT as it is "easier to give a pill than a shot".   - Will hold elliquis in the setting of vaginal bleeding

## 2021-03-18 NOTE — PROGRESS NOTE ADULT - ASSESSMENT
89 y/o F with PMHx MR, dementia, blind bilat, HTN, on Eliquis for DVT ppx (?) resident of [facility] ([place], [place]) home admitted 3/15 for vaginal bleeding. Imaging on admission c/f uterine and rectal carcinoma. Pt  refusing workup. Palliative following to assist with GOC.

## 2021-03-18 NOTE — PROGRESS NOTE ADULT - SUBJECTIVE AND OBJECTIVE BOX
SUBJECTIVE: pt seen and examined at bedside, pts niece at bedside. pt appears comfortable, no apparent distress. confused, but calm. unable to provide comprehensive ROS.    DNR in chart: Yes    ROS:  Pain: n    PEx:  T(C): 36.4 (03-18-21 @ 12:58), Max: 37.2 (03-18-21 @ 05:53)  HR: 68 (03-18-21 @ 12:58) (68 - 83)  BP: 138/75 (03-18-21 @ 12:58) (95/64 - 153/97)  RR: 16 (03-18-21 @ 12:58) (16 - 18)  SpO2: 100% (03-18-21 @ 12:58) (99% - 100%)  Wt(kg): --    General: Frail geriatric female, contracted, lying in bed NAD  HEENT: mucous membranes dry, bilateral pupils with cataracts, blind bilat, no oropharyngeal erythema, no mucosal bleeding, no thyromegaly.  Neck: supple  Cardiovascular: +S1/S2, RRR, no murmurs, gallops.  Respiratory: CTA B/L; no W/R/R. No increased work of breathing   Gastrointestinal: soft, NT/ND; no rebound tenderness or guarding, +BSx4  Extremities: WWP; no edema  MSK: +LE contracted. 	  	     ALLERGIES: No Known Allergies      OPIATE NAÏVE (Y/N): Y    MEDICATIONS: REVIEWED  MEDICATIONS  (STANDING):  brimonidine 0.2% Ophthalmic Solution 1 Drop(s) Both EYES every 12 hours  latanoprost 0.005% Ophthalmic Solution 1 Drop(s) Both EYES at bedtime  megestrol Suspension 400 milliGRAM(s) Oral every 24 hours  senna 2 Tablet(s) Oral every 24 hours  timolol 0.25% Solution 1 Drop(s) Both EYES every 12 hours    MEDICATIONS  (PRN):  acetaminophen  Suppository .. 325 milliGRAM(s) Rectal every 6 hours PRN Moderate Pain (4 - 6)    LABS: REVIEWED  CBC:                        12.8   11.03 )-----------( 228      ( 18 Mar 2021 08:51 )             40.9     CMP:    03-18    141  |  111<H>  |  29<H>  ----------------------------<  56<L>  4.9   |  17<L>  |  1.69<H>    Ca    10.2      18 Mar 2021 08:51  Phos  3.6     03-18  Mg     2.0     03-18    TPro  6.9  /  Alb  3.0<L>  /  TBili  0.9  /  DBili  x   /  AST  18  /  ALT  10  /  AlkPhos  51  03-17  Albumin, Serum: 3.0 g/dL (03-17-21 @ 09:00)      IMAGING: REVIEWED    Decision maker: The patient is UNable to participate in complex medical decision making conversations.   Legal surrogate:  -Tania Novoa #[phone]      ADVANCE DIRECTIVES  - discussed 3/16 with primary team, DNR DNI  - MOLST  completed by primary team and placed in physical chart    GOALS OF CARE DISCUSSION  - discussion with Tania as below

## 2021-03-19 PROCEDURE — 99232 SBSQ HOSP IP/OBS MODERATE 35: CPT | Mod: GC

## 2021-03-19 PROCEDURE — 99233 SBSQ HOSP IP/OBS HIGH 50: CPT

## 2021-03-19 RX ADMIN — Medication 1 DROP(S): at 05:44

## 2021-03-19 RX ADMIN — LATANOPROST 1 DROP(S): 0.05 SOLUTION/ DROPS OPHTHALMIC; TOPICAL at 21:47

## 2021-03-19 RX ADMIN — MEGESTROL ACETATE 400 MILLIGRAM(S): 40 SUSPENSION ORAL at 08:06

## 2021-03-19 RX ADMIN — BRIMONIDINE TARTRATE 1 DROP(S): 2 SOLUTION/ DROPS OPHTHALMIC at 18:31

## 2021-03-19 RX ADMIN — BRIMONIDINE TARTRATE 1 DROP(S): 2 SOLUTION/ DROPS OPHTHALMIC at 05:44

## 2021-03-19 RX ADMIN — Medication 1 DROP(S): at 18:33

## 2021-03-19 NOTE — PROGRESS NOTE ADULT - PROBLEM SELECTOR PLAN 7
No murmur auscultated on exam   -TTE results showed mild mitral regurgitation      #Dementia: pt at baseline  -TSH, B12 and folate WNL

## 2021-03-19 NOTE — PROGRESS NOTE ADULT - ASSESSMENT
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 CTA/P c/f uterine and rectal carcinoma, admitted for further work up. made comfort care

## 2021-03-19 NOTE — PROGRESS NOTE ADULT - PROBLEM SELECTOR PLAN 2
Imaging showing right rectal wall mass concerning for colorectal malignancy  -Surgery consulted in ED - no acute surgical intervention at this time, recommend colonoscopy.  - Colorectal surg - no acute surgical intervention as mass is nonobstructive. Recommend pelvic MRI, colonoscopy w/ biopsy, tumor markers, heme/onc consult. Will sign off, reconsult as needed   - GI consulted rec enema x2 and flex sig. Pt refused enema, cannot perform flex sig. GI to sign off, reconsult if family still requesting workup after GOC discussion this afternoon  - Per pall care, pt qualifies for hospice care  - pt is comfort care

## 2021-03-19 NOTE — PROGRESS NOTE ADULT - PROBLEM SELECTOR PLAN 3
CT Abdomen and Pelvis on 3/15: severe circumferential masslike mural thickening of the rectum with a discrete solid mass on the right rectal wallmeasuring 4.7 x 5.3 cm.   -Surgery 3/16: no acute surgical intervention indicated   -GI unable to flex sig 3/17 with bx dt pt agitated, combative  -pt is unlikely candidate for disease directed therapy   -goc as below

## 2021-03-19 NOTE — DIETITIAN INITIAL EVALUATION ADULT. - PROBLEM SELECTOR PLAN 4
Na 146, repeat 148 on admission. Likely 2/2 poor PO intake, mucous membranes dry on admission. S/p 2LNS bolus.   -Given up-trending sodium, started D5/ 1/2 NS (60cc/hr x 24 hours)   -Repeat BMP at 10 PM with improvement in Sodium to 142

## 2021-03-19 NOTE — PROGRESS NOTE ADULT - ASSESSMENT
89 y/o F with PMHx MR, dementia, blind bilat, HTN, on Eliquis for DVT ppx (?) resident of [facility] home admitted 3/15 for vaginal bleeding. Imaging on admission c/f uterine and rectal carcinoma. GOC discussed this admission, family electing to focus on comfort.  Palliative following to assist with GOC.

## 2021-03-19 NOTE — PROGRESS NOTE ADULT - PROBLEM SELECTOR PLAN 9
Spoke with niece, Ms. Tania Kohler on admission. Tania is the only living kin of patient, and therefore has been making all of her medical decisions for the past 5-6 years, despite not officially signing any form to be her HCP.   - Niece had discussion with family and made pt comfort care

## 2021-03-19 NOTE — PROGRESS NOTE ADULT - ASSESSMENT
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 CTA/P c/f uterine and rectal carcinoma, admitted for further work up.    Problem/Plan - 1:  ·  Problem: Malignant neoplasm of uterus, unspecified site.  Plan: Patient with vaginal bleeding 2 months per niece, hemodynamically stable. On exam in ED, patient noted to be bleeding at vaginal introitus, however DHIRAJ negative. CTA/P concerning for uterine and rectal carcinoma. Unclear history regarding history of cancer, symptoms including weight loss, fatigue etc. 2/2 patient's dementia (baseline A&Ox1).   -Gynecology evaluated patient in ED - no indication for transfusion or vaginal packing at this time. Gyn onc will follow if there is tissue diagnosis proving malignancy.  - Benign gyn saw pt again 3/16, unable to perform pelvic exam due to contractures and pts mental status. Nothing to do at this time as bleeding is minimal. Contact GYN for vaginal bleeding >2 pads in 2hrs  - CEA and prolactin mildly elevated, remainder of tumor markers unremarkable  - Per conversation with patient's HCP (anselmo), pt is now DNR/DNI. Niece will be coming in today at 1pm for further GOC discussion  - Per pall care, pt qualifies for hospice care.     Problem/Plan - 2:  ·  Problem: Rectal cancer.  Plan: Imaging showing right rectal wall mass concerning for colorectal malignancy  -Surgery consulted in ED - no acute surgical intervention at this time, recommend colonoscopy.  - Colorectal surg - no acute surgical intervention as mass is nonobstructive. Recommend pelvic MRI, colonoscopy w/ biopsy, tumor markers, heme/onc consult. Will sign off, reconsult as needed   - GI consulted rec enema x2 and flex sig. Pt refused enema, cannot perform flex sig. GI to sign off, reconsult if family still requesting workup after GOC discussion this afternoon  - Per conversation with patient's HCP (anselmo), pt is now DNR/DNI. Niece will be coming in today at 1pm for further GOC discussion  - Per pall care, pt qualifies for hospice care  - Pelvic MRI cancelled pending GOC discussion.     Problem/Plan - 3:  ·  Problem: Vaginal bleeding.  Plan: -Per Gyn, no indication for vaginal packing or transfusion at this time. Will continue to follow and attempt pelvic exam once patient amenable.   -Gyn onc will follow if there is tissue diagnosis proving malignancy.   -Maintain active type and screen.     Problem/Plan - 4:  ·  Problem: Acute kidney injury superimposed on CKD.  Plan: Baseline creatinine appears to be about 1.4-1.5, creatinine on admission 1.69, c/w CAYLA. Likely pre-renal in nature due to dehydration on exam.   -3/16 Cr 1.44  -3/17 Cr 1.60, s/p 500 cc of lactated ringers  -3/18 Cr 1.69  -Continue to trend renal function. If not improving can obtain urine studies.     Problem/Plan - 5:  ·  Problem: Hypernatremia.  Plan: Na  > admission 148. Likely 2/2 poor PO intake  - Na 142 3/17  - Na 141 3/18  - Now resolved.     Problem/Plan - 6:  Problem: Hypertension. Plan: Per nursing home, pt not on amlodipine. Currently prescribed metoprolol tartrate 25mg BID  - Held PM dose 3/17 for hypotension, prescription reordered w/ home parameters  - Continue home dose    #DVT PPx with Eliquis (?)  Per notes, patient may be on DVT PPx with Eliquis 2.5mg BID, however the medication is not on the patient's pharmacy list, and the niece denies that her aunt is on any anticoagulants. Additionally, US Duplex from last admission in 2020 shows US Duplex negative for any DVT. Spoke to provider at nursing home Dr. Rojo (cell [phone]) who confirmed elliquis 2.5mg BID since 7/2020 for primary ppx DVT as it is "easier to give a pill than a shot".   - Will hold elliquis in the setting of vaginal bleeding.    Problem/Plan - 7:  ·  Problem: Mitral valve insufficiency, unspecified etiology.  Plan: No murmur auscultated on exam   -TTE results showed mild mitral regurgitation      #Dementia: pt at baseline  -TSH, B12 and folate WNL.     Problem/Plan - 8:  ·  Problem: H/O blindness.  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   -Continue combigan and latanoprost eyedrops inpatient.     Problem/Plan - 9:  ·  Problem: Goals of care, counseling/discussion.  Plan: Spoke with niece, Ms. Tania Kohler on admission. Tania is the only living kin of patient, and therefore has been making all of her medical decisions for the past 5-6 years, despite not officially signing any form to be her HCP. Per discussion with Tania, the patient has no children and expressed to Tania recently that she wants to "live as long as possible".   -Risks and benefits of pursuing non-operative vs operative treatments for likely uterine and colorectal malignancy were outlined with Tania - she understands that her aunt may not be a surgical candidate and that she may want to simply rest and "let the cancer take over". However at this time, she wishes for her aunt to remain full code, for all extraordinary measures to be taken to keep her aunt alive, and to obtain tissue diagnosis of the mass so they can consider future treatment options.   -Tania would like to pursue colonoscopy for possible biopsy   -States that she may reconsider goals of care/code status depending on prognosis  -Per conversation with HCP, pt made DNR/DNI on 3/17  -Niece to come to hospital this afternoon, further treatment pending further GOC discussion.     Problem/Plan - 10:  Problem: Nutrition, metabolism, and development symptoms. Plan; F: d5 1/2 NS overnight (3/15), 1L NS (3/17)  E: monitor and replete  N: reg  GI: none  DVT: SCDs bilaterally; Eliquis 2.5 BID held in the setting of vaginal bleeding    Code Status: DNR/DNI  Dispo: [facility].

## 2021-03-19 NOTE — PROGRESS NOTE ADULT - PROBLEM SELECTOR PLAN 1
progressive dementia, FAST7d. bedbound, total care and full feed. minimally verbal.  -assistance with all ADLs, skin care prn  [facility]

## 2021-03-19 NOTE — PROGRESS NOTE ADULT - SUBJECTIVE AND OBJECTIVE BOX
SUBJECTIVE: pt seen and examined at bedside. responds to name. confused with some nonsensical speech. oriented to name only.  answers some simple questions appropriately. denies pain, denies nausea. unable to provide comprehensive ROS.      DNR in chart: yes    PEx:  T(C): 36.3 (03-19-21 @ 05:31), Max: 36.4 (03-18-21 @ 12:58)  HR: 98 (03-19-21 @ 05:31) (68 - 98)  BP: 132/71 (03-19-21 @ 05:31) (106/71 - 138/75)  RR: 16 (03-19-21 @ 05:31) (16 - 16)  SpO2: 99% (03-19-21 @ 05:31) (99% - 100%)  Wt(kg): --    General: Frail geriatric female, contracted, lying in bed NAD  HEENT: mucous membranes dry, bilateral pupils with cataracts, blind bilat, no oropharyngeal erythema, no mucosal bleeding, no thyromegaly.  Neck: supple  Cardiovascular: +S1/S2, RRR, no murmurs, gallops.  Respiratory: CTA B/L; no W/R/R. No increased work of breathing   Gastrointestinal: soft, NT/ND; no rebound tenderness or guarding, +BSx4  Extremities: WWP; no edema  MSK: +LE contracted.   Psych: calm, smiling    ALLERGIES: No Known Allergies      OPIATE NAÏVE (Y/N): Y    MEDICATIONS: REVIEWED  MEDICATIONS  (STANDING):  brimonidine 0.2% Ophthalmic Solution 1 Drop(s) Both EYES every 12 hours  latanoprost 0.005% Ophthalmic Solution 1 Drop(s) Both EYES at bedtime  megestrol Suspension 400 milliGRAM(s) Oral every 24 hours  senna 2 Tablet(s) Oral every 24 hours  timolol 0.25% Solution 1 Drop(s) Both EYES every 12 hours    MEDICATIONS  (PRN):  acetaminophen  Suppository .. 325 milliGRAM(s) Rectal every 6 hours PRN Moderate Pain (4 - 6)    LABS: REVIEWED  CBC:                        12.8   11.03 )-----------( 228      ( 18 Mar 2021 08:51 )             40.9     CMP:    03-18    141  |  111<H>  |  29<H>  ----------------------------<  56<L>  4.9   |  17<L>  |  1.69<H>    Ca    10.2      18 Mar 2021 08:51  Phos  3.6     03-18  Mg     2.0     03-18    Albumin, Serum: 3.0 g/dL (03-17-21 @ 09:00)      IMAGING: REVIEWED    Decision maker: The patient is UNable to participate in complex medical decision making conversations.   Legal surrogate:  -Tania Novoa #[phone]    ADVANCE DIRECTIVES  - discussed 3/16 with primary team, DNR DNI  - MOLST  completed by primary team and placed in physical chart    GOALS OF CARE DISCUSSION  - discussion with Tania 3/18, comfort care, dc back to [facility] with hospice

## 2021-03-19 NOTE — PROGRESS NOTE ADULT - PROBLEM SELECTOR PLAN 5
-pt unable to participate in GOC due to baseline mentation  -GOC discussed yesterday: no further workup, transition to comfort care  -mews exempt  -NO bipap NO hiflo  -dc lab draws  -VS q shift  -[facility]

## 2021-03-19 NOTE — PROGRESS NOTE ADULT - PROBLEM SELECTOR PLAN 6
Per nursing home, pt not on amlodipine. Currently prescribed metoprolol tartrate 25mg BID  - Held PM dose 3/17 for hypotension, prescription reordered w/ home parameters  - Continue home dose    #DVT PPx with Eliquis (?)  Per notes, patient may be on DVT PPx with Eliquis 2.5mg BID, however the medication is not on the patient's pharmacy list, and the niece denies that her aunt is on any anticoagulants. Additionally, US Duplex from last admission in 2020 shows US Duplex negative for any DVT. Spoke to provider at nursing home Dr. Rojo (cell [phone]) who confirmed elliquis 2.5mg BID since 7/2020 for primary ppx DVT as it is "easier to give a pill than a shot".   - Will hold elliquis in the setting of vaginal bleeding

## 2021-03-19 NOTE — DIETITIAN INITIAL EVALUATION ADULT. - PROBLEM SELECTOR PLAN 3
ADDENDUM: see #1 ; follow up GYN recs  -Per Gyn, no indication for vaginal packing or transfusion at this time   -Maintain active type and screen

## 2021-03-19 NOTE — DIETITIAN INITIAL EVALUATION ADULT. - PROBLEM SELECTOR PLAN 8
No murmur auscultated on exam   -F/u TTE ordered      ADDENDUM:   #dementia: pt at baseline, will check b12/folate /TSH

## 2021-03-19 NOTE — PROGRESS NOTE ADULT - SUBJECTIVE AND OBJECTIVE BOX
INTERVAL HPI/OVERNIGHT EVENTS:  Patient was seen and examined at bedside. As per nurse and patient, no o/n events, patient resting comfortably. No complaints at this time. Patient denies: fever, chills, dizziness, weakness, HA, Changes in vision, CP, palpitations, SOB, cough, N/V/D/C, dysuria, changes in bowel movements, LE edema. ROS otherwise negative.    VITAL SIGNS:  T(F): 97 (03-19-21 @ 12:22)  HR: 77 (03-19-21 @ 12:22)  BP: 133/72 (03-19-21 @ 12:22)  RR: 16 (03-19-21 @ 12:22)  SpO2: 100% (03-19-21 @ 12:22)  Wt(kg): --    PHYSICAL EXAM:    Constitutional: WDWN, NAD  HEENT: PERRL, EOMI, sclera non-icteric, neck supple, trachea midline, no masses, no JVD, MMM, good dentition  Respiratory: CTA b/l, good air entry b/l, no wheezing, no rhonchi, no rales, without accessory muscle use and no intercostal retractions  Cardiovascular: RRR, normal S1S2, no M/R/G  Gastrointestinal: soft, NTND, no masses palpable, BS normal  Extremities: Warm, well perfused, pulses equal bilateral upper and lower extremities, no edema, no clubbing. Capillary refill <2 sec  Neurological: AAOx3, CN Grossly intact  Skin: Normal temperature, warm, dry    MEDICATIONS  (STANDING):  brimonidine 0.2% Ophthalmic Solution 1 Drop(s) Both EYES every 12 hours  latanoprost 0.005% Ophthalmic Solution 1 Drop(s) Both EYES at bedtime  megestrol Suspension 400 milliGRAM(s) Oral every 24 hours  senna 2 Tablet(s) Oral every 24 hours  timolol 0.25% Solution 1 Drop(s) Both EYES every 12 hours    MEDICATIONS  (PRN):  acetaminophen  Suppository .. 325 milliGRAM(s) Rectal every 6 hours PRN Moderate Pain (4 - 6)      Allergies    No Known Allergies    Intolerances        LABS:                        12.8   11.03 )-----------( 228      ( 18 Mar 2021 08:51 )             40.9     03-18    141  |  111<H>  |  29<H>  ----------------------------<  56<L>  4.9   |  17<L>  |  1.69<H>    Ca    10.2      18 Mar 2021 08:51  Phos  3.6     03-18  Mg     2.0     03-18            RADIOLOGY & ADDITIONAL TESTS:  Reviewed

## 2021-03-19 NOTE — DIETITIAN INITIAL EVALUATION ADULT. - PROBLEM SELECTOR PLAN 5
[IMPROVING]  Baseline creatinine appears to be about 1.4-1.5, creatinine on admission 1.69, c/w CAYLA. Likely pre-renal in nature due to dehydration on exam.   -S/p 2L NS bolus, and D5 1/2 NS x 24 hrs  -Continue to trend renal function. If not improving can obtain urine studies

## 2021-03-19 NOTE — DIETITIAN INITIAL EVALUATION ADULT. - PROBLEM SELECTOR PLAN 1
Patient with vaginal bleeding x 1 day, hemodynamically stable. On exam, patient noted to be bleeding at vaginal introitus, however DHIRAJ negative. Imaging concerning for uterine and rectal carcinoma. Unclear history regarding history of cancer, symptoms including weight loss, fatigue etc. 2/2 patient's dementia.   -Gynecology evaluated patient in ED - no indication for transfusion or vaginal packing at this time. Gyn onc will follow if there is tissue diagnosis proving malignancy. Benign gyn will continue to follow and attempt pelvic exam once patient amenable.   -Per conversation with patient's niece, patient will have full work up and is full code   -See "Goals of care" below for more details regarding GOC discussion.   -Surgery consulted in ED - no acute surgical intervention at this time, recommend colonoscopy   -Consider GI consult in AM for colonoscopy for biopsy of rectal mass  -Palliative care consulted, f/u recs   -F/u tumor markers (CEA, CA-125, etc.)

## 2021-03-19 NOTE — PROGRESS NOTE ADULT - PROBLEM SELECTOR PLAN 10
F: n  E: monitor and replete  N: reg  GI: none  DVT: SCDs bilaterally; Eliquis 2.5 BID held in the setting of vaginal bleeding    Code Status: DNR/DNI  Dispo: rmf

## 2021-03-19 NOTE — DIETITIAN INITIAL EVALUATION ADULT. - PROBLEM SELECTOR PLAN 6
Hold home amlodipine in setting of chronic bleeding issue, restart prn    #DVT PPx with Eliquis (?)  Per notes, patient may be on DVT PPx with Eliquis 2.5mg BID, however the medication is not on the patient's pharmacy list, and the niece denies that her aunt is on any anticoagulants. Additionally, US Duplex from last admission in 2020 shows US Duplex negative for any DVT.

## 2021-03-19 NOTE — DIETITIAN INITIAL EVALUATION ADULT. - PROBLEM SELECTOR PLAN 7
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 Is on eye drops   -Continue combigan and latanoprost inpatient

## 2021-03-19 NOTE — PROGRESS NOTE ADULT - SUBJECTIVE AND OBJECTIVE BOX
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 Patient is AAOx1-2 at baseline and cannot provide answers 2/2 dementia. In ED, partial history obtained from niece (Tania Kohler) who states that patient has had vaginal bleeding for more than 2 months and she told rehab center to bring patient into the hospital. The patient herself was not aware that she is bleeding from her vagina and denies any chest pain, dizziness, shortness of breath, abdominal pain, nausea, vomiting. Further history unable to be obtained from patient. However, she does state that there is no history of cancer in her family. Patient is on Eliquis for unknown reason, however medication was not on med rec from pharmacy.     ED Course:   Vitals: /85, P 71, R 18, T 100F, O2 100% RA  Labs: no leukocytosis or anemia, , chloride 113, BUN/Cr 34/1.69 (baseline 1.5-1.6), lactate 2.8, VBG normal, UA unremarkable   Imaging: CTAP w/oral and IV contrast: findings c/f uterine and rectal carcinoma. severe circumferential masslike mural thickening of rectum with discrete solid mass on R rectal wall (4.7x5.3 cm), pelvis shows myomatous uterus with distension of uterus with blood and internal enhancing masslike material spanning 5.0x5.9 cm. No adenopathy noted.    Consults: Gynecology and Surgery. ED contacted niece who states she would like everything done for patient.   Meds: 2L NS bolus  (15 Mar 2021 17:51)                            12.8   11.03 )-----------( 228      ( 18 Mar 2021 08:51 )             40.9       03-18    141  |  111<H>  |  29<H>  ----------------------------<  56<L>  4.9   |  17<L>  |  1.69<H>    Ca    10.2      18 Mar 2021 08:51  Phos  3.6     03-18  Mg     2.0     03-18      Vital Signs Last 24 Hrs  T(C): 36.3 (19 Mar 2021 05:31), Max: 36.4 (18 Mar 2021 12:58)  T(F): 97.4 (19 Mar 2021 05:31), Max: 97.6 (18 Mar 2021 12:58)  HR: 98 (19 Mar 2021 05:31) (68 - 98)  BP: 132/71 (19 Mar 2021 05:31) (106/71 - 138/75)  BP(mean): --  RR: 16 (19 Mar 2021 05:31) (16 - 16)  SpO2: 99% (19 Mar 2021 05:31) (99% - 100%)    MEDICATIONS  (STANDING):  brimonidine 0.2% Ophthalmic Solution 1 Drop(s) Both EYES every 12 hours  latanoprost 0.005% Ophthalmic Solution 1 Drop(s) Both EYES at bedtime  megestrol Suspension 400 milliGRAM(s) Oral every 24 hours  senna 2 Tablet(s) Oral every 24 hours  timolol 0.25% Solution 1 Drop(s) Both EYES every 12 hours    MEDICATIONS  (PRN):  acetaminophen  Suppository .. 325 milliGRAM(s) Rectal every 6 hours PRN Moderate Pain (4 - 6)    Currently Undergoing Physical/ Occupational Therapy at bedside.    Functional Status Assessment:   3/18/2021    Previous Level of Function:     · Ambulation Skills	unable to perform  · Transfer Skills	unable to perform  · ADL Skills	needed assist  · Work/Leisure Activity	needed assist  · Additional Comments	Pt unable to provide PLOF 2/2 AMS. Per chart and , pt lives in [facility] and was not ambulatory PTA.    Cognitive Status Examination:   · Orientation	person  · Level of Consciousness	alert; confused  · Follows Commands and Answers Questions	able to follow single-step instructions; 75% of the time  · Personal Safety and Judgment	impaired    Range of Motion Exam:   · Range of Motion Examination	Contractures of BLE at hip, knee    Manual Muscle Testing:   · Manual Muscle Testing Results	not tested due to  Pain w/ palpation of BLE; flexion contracture of BL hip and knee    Bed Mobility: Sit to Supine:     · Level of [place]	maximum assist (25% patients effort)  · Physical Assist/Nonphysical Assist	2 person assist; verbal cues    Bed Mobility: Supine to Sit:     · Level of [place]	maximum assist (25% patients effort)  · Physical Assist/Nonphysical Assist	2 person assist; verbal cues    Bed Mobility Analysis:     · Bed Mobility Limitations	decreased ability to use arms for pushing/pulling; decreased ability to use legs for bridging/pushing  · Impairments Contributing to Impaired Bed Mobility	impaired balance; decreased ROM; decreased strength; pain    Balance Skills Assessment:     · Sitting Balance: Static	poor balance  · Sitting Balance: Dynamic	poor balance    Sensory Examination:   Sensory Examination:    Grossly Intact:   · Gross Sensory Examination	Unable to assess 2/2 AMS and pain to palpation BLE      Clinical Impressions:   · Criteria for Skilled Therapeutic Interventions	impairments found; anticipated discharge recommendation  · Impairments Found (describe specific impairments)	aerobic capacity/endurance; joint integrity and mobility; gait, locomotion, and balance        PM&R Impression: as above    Current Disposition Plan Recommendations:    return to SNF

## 2021-03-19 NOTE — PROGRESS NOTE ADULT - PROBLEM SELECTOR PLAN 4
Baseline creatinine appears to be about 1.4-1.5, creatinine on admission 1.69, c/w CAYLA. Likely pre-renal in nature due to dehydration on exam.   -3/16 Cr 1.44  -3/17 Cr 1.60, s/p 500 cc of lactated ringers  -3/18 Cr 1.69  - no lab draws pt is comfort care

## 2021-03-19 NOTE — PROGRESS NOTE ADULT - SUBJECTIVE AND OBJECTIVE BOX
GYN Progress Note HD#5    Patient seen and examined at bedside.  No acute events overnight. No acute complaints.  Appears to be at baseline mentation, AxO x1. Alert, denies any pain. Patient reluctant to be examined, but calmed once not touched.     Vital Signs Last 24 Hours  T(C): 36.1 (03-19-21 @ 12:22), Max: 36.3 (03-19-21 @ 05:31)  HR: 77 (03-19-21 @ 12:22) (74 - 98)  BP: 133/72 (03-19-21 @ 12:22) (106/71 - 133/72)  RR: 16 (03-19-21 @ 12:22) (16 - 16)  SpO2: 100% (03-19-21 @ 12:22) (99% - 100%)        Physical Exam:  General: NAD      Rust tinged urine on suction tubing; no overt signs of heavy bleeding at bedside    Labs:                        12.8   11.03 )-----------( 228      ( 18 Mar 2021 08:51 )             40.9   baso 0.4    eos 1.6    imm gran 0.7    lymph 30.5   mono 5.9    poly 60.9                         12.9   11.04 )-----------( 228      ( 17 Mar 2021 09:00 )             39.8   baso x      eos x      imm gran x      lymph x      mono x      poly x          MEDICATIONS  (STANDING):  brimonidine 0.2% Ophthalmic Solution 1 Drop(s) Both EYES every 12 hours  latanoprost 0.005% Ophthalmic Solution 1 Drop(s) Both EYES at bedtime  megestrol Suspension 400 milliGRAM(s) Oral every 24 hours  senna 2 Tablet(s) Oral every 24 hours  timolol 0.25% Solution 1 Drop(s) Both EYES every 12 hours    MEDICATIONS  (PRN):  acetaminophen  Suppository .. 325 milliGRAM(s) Rectal every 6 hours PRN Moderate Pain (4 - 6)

## 2021-03-19 NOTE — DIETITIAN INITIAL EVALUATION ADULT. - OTHER INFO
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 CTA/P c/f uterine and rectal carcinoma, admitted for further work up     Pt seen in room, resting in bed. Information obtained from EMR and team 2/2 pt not opening eyes to name calling. Per GOC note 3/18, pt now on comfort care. On regular diet, pt with poor PO intake observed breakfast tray 0% eaten. Recommend continue pleasure feeds as tolerated. Honor all food preferences. Please see full recs below.     Patients with goals of care identified as “Comfort Measures Only” as a Patient Care Order or documented in medical provider notes, can be determined at low complexity and follow-up by the dietitian by consult only.

## 2021-03-19 NOTE — PROGRESS NOTE ADULT - ASSESSMENT
Patient is a 89 year old female, brought in by ambulance from her nursing home after episode of vaginal bleeding. CT scan suspicious for possible uterine/rectal carcinoma. Patient is not able to give a history/consent to pelvic exam 2/2 dementia. Patient unable to tolerate flex-sig 3/17; decision was made by niece to make DNR. Goals of care discussion occurred and decision was made to proceed with comfort care. Gyn agrees; will sign off at this time. Please reconsult if any other question arises or if there is any concern for active, heavy vaginal bleeding.       d/w Gyn team   Sebastian Sung PGY2

## 2021-03-19 NOTE — PROGRESS NOTE ADULT - PROBLEM SELECTOR PLAN 7
geriatric female with multiple co morbidities including severe dementia admitted for workup of malignancy with two primaries and unlikely candidate for disease directed therapy. [facility] discussed this admission as above.  -dc back to [facility] where hospice will be initiated; unit SW to assist with discharge for today or tomorrow  -Thank you for this consult. Palliative medicine will sign off. Please reconsult if the patient's symptoms and/or goals of care change, need to be readdressed, or if patient is readmitted in the future.

## 2021-03-19 NOTE — PROGRESS NOTE ADULT - PROBLEM SELECTOR PLAN 1
Patient with vaginal bleeding 2 months per niece, hemodynamically stable. On exam in ED, patient noted to be bleeding at vaginal introitus, however DHIRAJ negative. CTA/P concerning for uterine and rectal carcinoma. Unclear history regarding history of cancer, symptoms including weight loss, fatigue etc. 2/2 patient's dementia (baseline A&Ox1).   -Gynecology evaluated patient in ED - no indication for transfusion or vaginal packing at this time. Gyn onc will follow if there is tissue diagnosis proving malignancy.  - Benign gyn saw pt again 3/16, unable to perform pelvic exam due to contractures and pts mental status. Nothing to do at this time as bleeding is minimal. Contact GYN for vaginal bleeding >2 pads in 2hrs  - CEA and prolactin mildly elevated, remainder of tumor markers unremarkable  - Per conversation with patient's HCP (niece), pt is now DNR/DNI. Niece will be coming in today at 1pm for further GOC discussion  - pt made comfort care

## 2021-03-20 PROCEDURE — 99233 SBSQ HOSP IP/OBS HIGH 50: CPT | Mod: GC

## 2021-03-20 RX ADMIN — BRIMONIDINE TARTRATE 1 DROP(S): 2 SOLUTION/ DROPS OPHTHALMIC at 06:43

## 2021-03-20 RX ADMIN — MEGESTROL ACETATE 400 MILLIGRAM(S): 40 SUSPENSION ORAL at 07:31

## 2021-03-20 RX ADMIN — Medication 1 DROP(S): at 17:54

## 2021-03-20 RX ADMIN — LATANOPROST 1 DROP(S): 0.05 SOLUTION/ DROPS OPHTHALMIC; TOPICAL at 21:16

## 2021-03-20 RX ADMIN — Medication 1 DROP(S): at 06:43

## 2021-03-20 RX ADMIN — BRIMONIDINE TARTRATE 1 DROP(S): 2 SOLUTION/ DROPS OPHTHALMIC at 17:54

## 2021-03-20 NOTE — PROGRESS NOTE ADULT - PROBLEM SELECTOR PLAN 6
Per nursing home, pt not on amlodipine. Currently prescribed metoprolol tartrate 25mg BID  - Held PM dose 3/17 for hypotension, prescription reordered w/ home parameters  - Continue home dose    #DVT PPx with Eliquis (?)  Per notes, patient may be on DVT PPx with Eliquis 2.5mg BID, however the medication is not on the patient's pharmacy list, and the niece denies that her aunt is on any anticoagulants. Additionally, US Duplex from last admission in 2020 shows US Duplex negative for any DVT. Spoke to provider at nursing home Dr. Rojo (cell [phone]) who confirmed elliquis 2.5mg BID since 7/2020 for primary ppx DVT as it is "easier to give a pill than a shot".   - Will hold elliquis in the setting of vaginal bleeding

## 2021-03-20 NOTE — PROGRESS NOTE ADULT - SUBJECTIVE AND OBJECTIVE BOX
INTERVAL HPI/OVERNIGHT EVENTS:  Patient was seen and examined at bedside. As per nurse and patient, no o/n events, patient resting comfortably. No complaints at this time. Pt states she is very comfortable Patient denies: fever, chills, dizziness, weakness, HA, Changes in vision, CP, palpitations, SOB, cough, N/V/D/C, dysuria, changes in bowel movements, LE edema. ROS otherwise negative.    VITAL SIGNS:  T(F): 98.1 (03-20-21 @ 05:02)  HR: 95 (03-20-21 @ 05:02)  BP: 104/67 (03-20-21 @ 05:02)  RR: 16 (03-20-21 @ 05:02)  SpO2: 98% (03-20-21 @ 05:02)  Wt(kg): --    PHYSICAL EXAM:    Constitutional: WDWN, NAD  HEENT: PERRL, EOMI, sclera non-icteric, neck supple, trachea midline, no masses, no JVD, MMM, good dentition  Respiratory: CTA b/l, good air entry b/l, no wheezing, no rhonchi, no rales, without accessory muscle use and no intercostal retractions  Cardiovascular: RRR, normal S1S2, no M/R/G  Gastrointestinal: soft, NTND, no masses palpable, BS normal  Extremities: Warm, well perfused, pulses equal bilateral upper and lower extremities, LE contracted at baseline no edema, no clubbing. Capillary refill <2 sec  Neurological: AAOx3, CN Grossly intact  Skin: Normal temperature, warm, dry    MEDICATIONS  (STANDING):  brimonidine 0.2% Ophthalmic Solution 1 Drop(s) Both EYES every 12 hours  latanoprost 0.005% Ophthalmic Solution 1 Drop(s) Both EYES at bedtime  megestrol Suspension 400 milliGRAM(s) Oral every 24 hours  senna 2 Tablet(s) Oral every 24 hours  timolol 0.25% Solution 1 Drop(s) Both EYES every 12 hours    MEDICATIONS  (PRN):  acetaminophen  Suppository .. 325 milliGRAM(s) Rectal every 6 hours PRN Moderate Pain (4 - 6)      Allergies    No Known Allergies    Intolerances        LABS:                RADIOLOGY & ADDITIONAL TESTS:  Reviewed

## 2021-03-20 NOTE — PROGRESS NOTE ADULT - ASSESSMENT
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 home for vaginal bleeding x2 months (per niece). CTA/P c/f uterine and rectal carcinoma, admitted for further work up. made comfort care

## 2021-03-21 PROCEDURE — 99233 SBSQ HOSP IP/OBS HIGH 50: CPT

## 2021-03-21 RX ADMIN — Medication 1 DROP(S): at 05:20

## 2021-03-21 RX ADMIN — LATANOPROST 1 DROP(S): 0.05 SOLUTION/ DROPS OPHTHALMIC; TOPICAL at 22:34

## 2021-03-21 RX ADMIN — BRIMONIDINE TARTRATE 1 DROP(S): 2 SOLUTION/ DROPS OPHTHALMIC at 05:20

## 2021-03-21 RX ADMIN — MEGESTROL ACETATE 400 MILLIGRAM(S): 40 SUSPENSION ORAL at 07:12

## 2021-03-21 RX ADMIN — SENNA PLUS 2 TABLET(S): 8.6 TABLET ORAL at 22:34

## 2021-03-21 RX ADMIN — Medication 1 DROP(S): at 17:52

## 2021-03-21 RX ADMIN — BRIMONIDINE TARTRATE 1 DROP(S): 2 SOLUTION/ DROPS OPHTHALMIC at 17:51

## 2021-03-21 NOTE — PROGRESS NOTE ADULT - PROBLEM SELECTOR PLAN 4
Baseline creatinine appears to be about 1.4-1.5, creatinine on admission 1.69, c/w CAYLA. Likely pre-renal in nature due to dehydration on exam.   - no lab draws pt is comfort care

## 2021-03-21 NOTE — PROGRESS NOTE ADULT - ASSESSMENT
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 CTA/P c/f uterine and rectal carcinoma, admitted for further work up. made comfort care

## 2021-03-21 NOTE — PROGRESS NOTE ADULT - PROBLEM SELECTOR PLAN 10
F: n  E: monitor and replete  N: reg  GI: none  DVT: SCDs bilaterally; Eliquis 2.5 BID held in the setting of vaginal bleeding    Code Status: DNR/DNI  Dispo: rmf pending hospice

## 2021-03-21 NOTE — PROGRESS NOTE ADULT - PROBLEM SELECTOR PLAN 2
Imaging showing right rectal wall mass concerning for colorectal malignancy  -Surgery consulted in ED - no acute surgical intervention at this time, recommended colonoscopy.  - Colorectal surg - no acute surgical intervention as mass is nonobstructive, recommended pelvic MRI, colonoscopy w/ biopsy, tumor markers, heme/onc   - GI consulted rec enema x2 and flex sig though pt refused enema, cannot perform flex sig  - Per pall care, pt qualifies for hospice care  - pt is comfort care

## 2021-03-21 NOTE — PROGRESS NOTE ADULT - PROBLEM SELECTOR PLAN 6
Per nursing home, pt not on amlodipine. Currently prescribed metoprolol tartrate 25mg BID  - Held PM dose 3/17 for hypotension, prescription reordered w/ home parameters  - Continue home dose    #DVT PPx with Eliquis (?)  Per notes, patient may be on DVT PPx with Eliquis 2.5mg BID, however the medication is not on the patient's pharmacy list, and the niece denies that her aunt is on any anticoagulants. Additionally, US Duplex from last admission in 2020 shows US Duplex negative for any DVT. Spoke to provider at nursing home Dr. Rojo (cell [phone]) who confirmed elliquis 2.5mg BID since 7/2020 for primary ppx DVT as it is "easier to give a pill than a shot".   - Will hold Eliquis in the setting of vaginal bleeding

## 2021-03-21 NOTE — PROGRESS NOTE ADULT - SUBJECTIVE AND OBJECTIVE BOX
INTERNAL MEDICINE PROGRESS NOTE  Patient seen and examined at bedside. Patient reports she is comfortable, ate breakfast this morning and reports feeling well. Denies fevers, chills, nausea, vomiting, abdominal pain, shortness of breath.    PERTINENT REVIEW OF SYSTEMS:  CONSTITUTIONAL: No weakness, fevers or chills  HEENT: No visual changes; No vertigo or throat pain   GASTROINTESTINAL: No abdominal or epigastric pain. No nausea, vomiting, or hematemesis; No diarrhea or constipation. No melena or hematochezia.  NEUROLOGICAL: No numbness or weakness  SKIN: No itching, burning, rashes, or lesions     Allergies    No Known Allergies    Intolerances      MEDICATIONS:  MEDICATIONS  (STANDING):  brimonidine 0.2% Ophthalmic Solution 1 Drop(s) Both EYES every 12 hours  latanoprost 0.005% Ophthalmic Solution 1 Drop(s) Both EYES at bedtime  megestrol Suspension 400 milliGRAM(s) Oral every 24 hours  senna 2 Tablet(s) Oral every 24 hours  timolol 0.25% Solution 1 Drop(s) Both EYES every 12 hours    MEDICATIONS  (PRN):  acetaminophen  Suppository .. 325 milliGRAM(s) Rectal every 6 hours PRN Moderate Pain (4 - 6)    Vital Signs Last 24 Hrs  T(C): 36.6 (21 Mar 2021 13:16), Max: 36.8 (20 Mar 2021 13:50)  T(F): 97.9 (21 Mar 2021 13:16), Max: 98.2 (20 Mar 2021 13:50)  HR: 90 (21 Mar 2021 13:16) (80 - 92)  BP: 121/68 (21 Mar 2021 13:16) (110/65 - 139/69)  BP(mean): --  RR: 16 (21 Mar 2021 13:16) (16 - 17)  SpO2: 97% (21 Mar 2021 13:16) (96% - 98%)    03-20 @ 07:01  -  03-21 @ 07:00  --------------------------------------------------------  IN: 100 mL / OUT: 0 mL / NET: 100 mL      PHYSICAL EXAM:  Constitutional: WDWN, NAD  HEENT: PERRL, EOMI, sclera non-icteric, neck supple, trachea midline, no masses, no JVD, MMM, good dentition  Respiratory: CTA b/l, good air entry b/l, no wheezing, no rhonchi, no rales, without accessory muscle use and no intercostal retractions  Cardiovascular: RRR, normal S1S2, no M/R/G  Gastrointestinal: soft, NTND, no masses palpable, BS normal  Extremities: Warm, well perfused, pulses equal bilateral upper and lower extremities, LE contracted at baseline no edema, no clubbing. Capillary refill <2 sec  Neurological: AAOx3, CN Grossly intact  Skin: Normal temperature, warm, dry    LABS:  No new data            RADIOLOGY & ADDITIONAL STUDIES:  Reviewed

## 2021-03-22 LAB — SARS-COV-2 RNA SPEC QL NAA+PROBE: NEGATIVE — SIGNIFICANT CHANGE UP

## 2021-03-22 PROCEDURE — 99232 SBSQ HOSP IP/OBS MODERATE 35: CPT | Mod: GC

## 2021-03-22 RX ADMIN — Medication 325 MILLIGRAM(S): at 04:30

## 2021-03-22 RX ADMIN — LATANOPROST 1 DROP(S): 0.05 SOLUTION/ DROPS OPHTHALMIC; TOPICAL at 21:03

## 2021-03-22 RX ADMIN — Medication 1 DROP(S): at 18:45

## 2021-03-22 RX ADMIN — Medication 325 MILLIGRAM(S): at 03:25

## 2021-03-22 RX ADMIN — Medication 1 DROP(S): at 05:45

## 2021-03-22 RX ADMIN — SENNA PLUS 2 TABLET(S): 8.6 TABLET ORAL at 21:03

## 2021-03-22 RX ADMIN — BRIMONIDINE TARTRATE 1 DROP(S): 2 SOLUTION/ DROPS OPHTHALMIC at 05:45

## 2021-03-22 RX ADMIN — BRIMONIDINE TARTRATE 1 DROP(S): 2 SOLUTION/ DROPS OPHTHALMIC at 18:45

## 2021-03-22 RX ADMIN — MEGESTROL ACETATE 400 MILLIGRAM(S): 40 SUSPENSION ORAL at 06:45

## 2021-03-22 NOTE — PROGRESS NOTE ADULT - SUBJECTIVE AND OBJECTIVE BOX
INTERVAL HPI/OVERNIGHT EVENTS:  Patient was seen and examined at bedside. As per nurse and patient, no o/n events, patient resting comfortably. No complaints at this time. Patient denies: fever, chills, dizziness, weakness, HA, Changes in vision, CP, palpitations, SOB, cough, N/V/D/C, dysuria, changes in bowel movements, LE edema. ROS otherwise negative.    VITAL SIGNS:  T(F): 98 (03-22-21 @ 05:51)  HR: 107 (03-22-21 @ 05:51)  BP: 120/72 (03-22-21 @ 05:51)  RR: 17 (03-22-21 @ 05:51)  SpO2: 100% (03-22-21 @ 05:51)  Wt(kg): --    PHYSICAL EXAM:    Constitutional: WDWN, NAD  HEENT: PERRL, EOMI, sclera non-icteric, neck supple, trachea midline, no masses, no JVD, MMM, good dentition  Respiratory: CTA b/l, good air entry b/l, no wheezing, no rhonchi, no rales, without accessory muscle use and no intercostal retractions  Cardiovascular: RRR, normal S1S2, no M/R/G  Gastrointestinal: soft, NTND, no masses palpable, BS normal  Extremities: Warm, well perfused, pulses equal bilateral upper and lower extremities, LE contracted at baseline no edema, no clubbing. Capillary refill <2 sec  Neurological: AAOx3, CN Grossly intact  Skin: Normal temperature, warm, dry    MEDICATIONS  (STANDING):  brimonidine 0.2% Ophthalmic Solution 1 Drop(s) Both EYES every 12 hours  latanoprost 0.005% Ophthalmic Solution 1 Drop(s) Both EYES at bedtime  megestrol Suspension 400 milliGRAM(s) Oral every 24 hours  senna 2 Tablet(s) Oral every 24 hours  timolol 0.25% Solution 1 Drop(s) Both EYES every 12 hours    MEDICATIONS  (PRN):  acetaminophen  Suppository .. 325 milliGRAM(s) Rectal every 6 hours PRN Moderate Pain (4 - 6)      Allergies    No Known Allergies    Intolerances        LABS:                RADIOLOGY & ADDITIONAL TESTS:  Reviewed

## 2021-03-22 NOTE — PROGRESS NOTE ADULT - PROBLEM SELECTOR PLAN 6
Per nursing home, pt not on amlodipine. Currently prescribed metoprolol tartrate 25mg BID  - Held PM dose 3/17 for hypotension, prescription reordered w/ home parameters  - Continue home dose    #DVT PPx with Eliquis (?)  Per notes, patient may be on DVT PPx with Eliquis 2.5mg BID, however the medication is not on the patient's pharmacy list, and the niece denies that her aunt is on any anticoagulants. Additionally, US Duplex from last admission in 2020 shows US Duplex negative for any DVT. Spoke to provider at nursing home Dr. Rojo (cell [phone]) who confirmed elliquis 2.5mg BID since 7/2020 for primary ppx DVT as it is "easier to give a pill than a shot".   - Will hold elliquis in the setting of vaginal bleeding

## 2021-03-22 NOTE — PROGRESS NOTE ADULT - ASSESSMENT
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 CTA/P c/f uterine and rectal carcinoma, admitted for further work up. made comfort care dispo to hospice

## 2021-03-23 ENCOUNTER — TRANSCRIPTION ENCOUNTER (OUTPATIENT)
Age: 86
End: 2021-03-23

## 2021-03-23 VITALS
TEMPERATURE: 99 F | SYSTOLIC BLOOD PRESSURE: 111 MMHG | HEART RATE: 93 BPM | RESPIRATION RATE: 18 BRPM | DIASTOLIC BLOOD PRESSURE: 63 MMHG | OXYGEN SATURATION: 98 %

## 2021-03-23 PROCEDURE — 93306 TTE W/DOPPLER COMPLETE: CPT

## 2021-03-23 PROCEDURE — 80053 COMPREHEN METABOLIC PANEL: CPT

## 2021-03-23 PROCEDURE — 84146 ASSAY OF PROLACTIN: CPT

## 2021-03-23 PROCEDURE — 86301 IMMUNOASSAY TUMOR CA 19-9: CPT

## 2021-03-23 PROCEDURE — 86850 RBC ANTIBODY SCREEN: CPT

## 2021-03-23 PROCEDURE — 82378 CARCINOEMBRYONIC ANTIGEN: CPT

## 2021-03-23 PROCEDURE — 86300 IMMUNOASSAY TUMOR CA 15-3: CPT

## 2021-03-23 PROCEDURE — 71045 X-RAY EXAM CHEST 1 VIEW: CPT

## 2021-03-23 PROCEDURE — 82803 BLOOD GASES ANY COMBINATION: CPT

## 2021-03-23 PROCEDURE — 84100 ASSAY OF PHOSPHORUS: CPT

## 2021-03-23 PROCEDURE — 85730 THROMBOPLASTIN TIME PARTIAL: CPT

## 2021-03-23 PROCEDURE — 86900 BLOOD TYPING SEROLOGIC ABO: CPT

## 2021-03-23 PROCEDURE — 82607 VITAMIN B-12: CPT

## 2021-03-23 PROCEDURE — 85610 PROTHROMBIN TIME: CPT

## 2021-03-23 PROCEDURE — 80048 BASIC METABOLIC PNL TOTAL CA: CPT

## 2021-03-23 PROCEDURE — 86901 BLOOD TYPING SEROLOGIC RH(D): CPT

## 2021-03-23 PROCEDURE — 97161 PT EVAL LOW COMPLEX 20 MIN: CPT

## 2021-03-23 PROCEDURE — 85025 COMPLETE CBC W/AUTO DIFF WBC: CPT

## 2021-03-23 PROCEDURE — 82746 ASSAY OF FOLIC ACID SERUM: CPT

## 2021-03-23 PROCEDURE — 84443 ASSAY THYROID STIM HORMONE: CPT

## 2021-03-23 PROCEDURE — 85027 COMPLETE CBC AUTOMATED: CPT

## 2021-03-23 PROCEDURE — 74177 CT ABD & PELVIS W/CONTRAST: CPT

## 2021-03-23 PROCEDURE — U0003: CPT

## 2021-03-23 PROCEDURE — 86304 IMMUNOASSAY TUMOR CA 125: CPT

## 2021-03-23 PROCEDURE — 87635 SARS-COV-2 COVID-19 AMP PRB: CPT

## 2021-03-23 PROCEDURE — 83735 ASSAY OF MAGNESIUM: CPT

## 2021-03-23 PROCEDURE — 81003 URINALYSIS AUTO W/O SCOPE: CPT

## 2021-03-23 PROCEDURE — 83690 ASSAY OF LIPASE: CPT

## 2021-03-23 PROCEDURE — 99239 HOSP IP/OBS DSCHRG MGMT >30: CPT | Mod: GC

## 2021-03-23 PROCEDURE — 83605 ASSAY OF LACTIC ACID: CPT

## 2021-03-23 PROCEDURE — 99285 EMERGENCY DEPT VISIT HI MDM: CPT | Mod: 25

## 2021-03-23 PROCEDURE — U0005: CPT

## 2021-03-23 PROCEDURE — 96360 HYDRATION IV INFUSION INIT: CPT

## 2021-03-23 PROCEDURE — 36415 COLL VENOUS BLD VENIPUNCTURE: CPT

## 2021-03-23 RX ORDER — ACETAMINOPHEN 500 MG
650 TABLET ORAL ONCE
Refills: 0 | Status: COMPLETED | OUTPATIENT
Start: 2021-03-23 | End: 2021-03-23

## 2021-03-23 RX ORDER — ACETAMINOPHEN 500 MG
650 TABLET ORAL ONCE
Refills: 0 | Status: DISCONTINUED | OUTPATIENT
Start: 2021-03-23 | End: 2021-03-23

## 2021-03-23 RX ADMIN — BRIMONIDINE TARTRATE 1 DROP(S): 2 SOLUTION/ DROPS OPHTHALMIC at 17:44

## 2021-03-23 RX ADMIN — Medication 1 DROP(S): at 17:44

## 2021-03-23 RX ADMIN — Medication 650 MILLIGRAM(S): at 15:14

## 2021-03-23 RX ADMIN — MEGESTROL ACETATE 400 MILLIGRAM(S): 40 SUSPENSION ORAL at 07:11

## 2021-03-23 RX ADMIN — BRIMONIDINE TARTRATE 1 DROP(S): 2 SOLUTION/ DROPS OPHTHALMIC at 06:11

## 2021-03-23 RX ADMIN — Medication 1 DROP(S): at 06:11

## 2021-03-23 RX ADMIN — Medication 650 MILLIGRAM(S): at 15:54

## 2021-03-23 NOTE — PROGRESS NOTE ADULT - PROBLEM SELECTOR PROBLEM 3
Malignant neoplasm of rectum
Vaginal bleeding
Vaginal bleeding
Malignant neoplasm of rectum
Malignant neoplasm of rectum
Vaginal bleeding

## 2021-03-23 NOTE — PROGRESS NOTE ADULT - ASSESSMENT
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 [place] home for vaginal bleeding x2 months (per niece). CTA/P c/f uterine and rectal carcinoma, admitted for further work up. made comfort care dispo to hospice

## 2021-03-23 NOTE — PROGRESS NOTE ADULT - PROBLEM SELECTOR PROBLEM 1
Functional quadriplegia
Functional quadriplegia
Malignant neoplasm of uterus, unspecified site
Functional quadriplegia
Malignant neoplasm of uterus, unspecified site

## 2021-03-23 NOTE — PROGRESS NOTE ADULT - PROBLEM SELECTOR PROBLEM 6
Hypertension
DNR (do not resuscitate)
Hypertension
DNR (do not resuscitate)
Hypertension
DNR (do not resuscitate)
Hypertension

## 2021-03-23 NOTE — PROGRESS NOTE ADULT - SUBJECTIVE AND OBJECTIVE BOX
INTERVAL HPI/OVERNIGHT EVENTS:  Patient was seen and examined at bedside. As per nurse and patient, no o/n events, patient resting comfortably. No complaints at this time. Patient denies: fever, chills, dizziness, weakness, HA, Changes in vision, CP, palpitations, SOB, cough, N/V/D/C, dysuria, changes in bowel movements, LE edema. ROS otherwise negative.    VITAL SIGNS:  T(F): 98 (03-23-21 @ 10:16)  HR: 98 (03-23-21 @ 10:16)  BP: 114/74 (03-23-21 @ 10:16)  RR: 18 (03-23-21 @ 10:16)  SpO2: 98% (03-23-21 @ 10:16)  Wt(kg): --    PHYSICAL EXAM:    Constitutional: WDWN, NAD, blind  HEENT: PERRL, EOMI, sclera non-icteric, neck supple, trachea midline, no masses, no JVD, MMM, good dentition  Respiratory: CTA b/l, good air entry b/l, no wheezing, no rhonchi, no rales, without accessory muscle use and no intercostal retractions  Cardiovascular: RRR, normal S1S2, no M/R/G  Gastrointestinal: soft, NTND, no masses palpable, BS normal  Extremities: Warm, well perfused, pulses equal bilateral upper and lower extremities, no edema, no clubbing. Capillary refill <2 sec  Neurological: AAOx1 (baseline) , CN Grossly intact  Skin: Normal temperature, warm, dry    MEDICATIONS  (STANDING):  brimonidine 0.2% Ophthalmic Solution 1 Drop(s) Both EYES every 12 hours  latanoprost 0.005% Ophthalmic Solution 1 Drop(s) Both EYES at bedtime  megestrol Suspension 400 milliGRAM(s) Oral every 24 hours  senna 2 Tablet(s) Oral every 24 hours  timolol 0.25% Solution 1 Drop(s) Both EYES every 12 hours    MEDICATIONS  (PRN):  acetaminophen  Suppository .. 325 milliGRAM(s) Rectal every 6 hours PRN Moderate Pain (4 - 6)      Allergies    No Known Allergies    Intolerances        LABS:                RADIOLOGY & ADDITIONAL TESTS:  Reviewed

## 2021-03-23 NOTE — PROGRESS NOTE ADULT - PROBLEM SELECTOR PROBLEM 2
Neoplasm related pain
Rectal cancer
Rectal cancer
Neoplasm related pain
Rectal cancer
Neoplasm related pain
Rectal cancer

## 2021-03-23 NOTE — PROGRESS NOTE ADULT - ATTENDING COMMENTS
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 Unable to obtain tissue diagnosis due to issues regarding consent and inability to tolerate flexible sigmoidoscopy. Goals of care with family yielding decision to pursue comfort measures which appears appropriate given patient's dementia and frailty. Patient in NAD and reports being comfortable on exam today. Patient pending transition to hospice.
Please see attending attesation to today's discharge summary.
Patient discussed with resident team and plan of care reviewed. I have personally reviewed all pertinent labs and imaging and performed an independent history and physical. Resident note personally reviewed, and I agree with above resident note with the following additions:    89YOF with history of dementia (baseline AAOx1), glaucoma, essential HTN, CKD3, mitral regurgitation admitted for abnormal uterine bleeding found to have rectal and uterine masses concerning for malignancy as well as CAYLA on CKD3, hypernatremia.    Seen by GYN, GI, surgery, palliative care this admission. Needs biopsy of masses but patient not cooperating with enemas for flex sig or pelvic exam by GYN., Will get MRI pelvis per surgery for now and discuss further GOC with family; if patient continuing not to be adherent with attempts to biopsy 2/2 dementia may need to be deferred to outpatient setting.
Patient discussed with resident team and plan of care reviewed. I have personally reviewed all pertinent labs and imaging and performed an independent history and physical. Resident note personally reviewed, and I agree with above resident note with the following additions:    89YOF with history of dementia (baseline AAOx1), glaucoma, essential HTN, CKD3, mitral regurgitation admitted for abnormal uterine bleeding found to have rectal and uterine masses concerning for malignancy as well as CAYLA on CKD3, hypernatremia.    Seen by GYN, GI, surgery, palliative care. Will get MRI pelvis per surgery as well; would be difficult to do pelvic exam for biopsy due to contracted legs. GI to do flex sig tomorrow for biopsy, tumor markers pending. Further management per imaging and biopsy results.

## 2021-03-23 NOTE — PROGRESS NOTE ADULT - PROVIDER SPECIALTY LIST ADULT
GYN
Internal Medicine
Surgery
Internal Medicine
GYN
GYN
Gastroenterology
Rehab Medicine
Surgery
Palliative Care
Internal Medicine
Palliative Care
Internal Medicine
Palliative Care
Internal Medicine

## 2021-03-23 NOTE — DISCHARGE NOTE NURSING/CASE MANAGEMENT/SOCIAL WORK - PATIENT PORTAL LINK FT
You can access the FollowMyHealth Patient Portal offered by [facility] by registering at the following website: [email or web address]. By joining [employer]’s FollowMyHealth portal, you will also be able to view your health information using other applications (apps) compatible with our system.

## 2021-03-23 NOTE — PROGRESS NOTE ADULT - PROBLEM SELECTOR PLAN 6
Per nursing home, pt not on amlodipine. Currently prescribed metoprolol tartrate 25mg BID  - Held PM dose 3/17 for hypotension, prescription reordered w/ home parameters  - Continue home dose    #DVT PPx with Eliquis (?)  Per notes, patient may be on DVT PPx with Eliquis 2.5mg BID, however the medication is not on the patient's pharmacy list, and the niece denies that her aunt is on any anticoagulants. Additionally, US Duplex from last admission in 2020 shows US Duplex negative for any DVT. Spoke to provider at nursing home Dr. Rojo (cell [phone]) who confirmed elliquis 2.5mg BID since 7/2020 for primary ppx DVT as it is "easier to give a pill than a shot".   - Will hold elliquis in the setting of vaginal bleeding

## 2021-03-31 DIAGNOSIS — N95.0 POSTMENOPAUSAL BLEEDING: ICD-10-CM

## 2021-03-31 DIAGNOSIS — M24.561 CONTRACTURE, RIGHT KNEE: ICD-10-CM

## 2021-03-31 DIAGNOSIS — N18.32 CHRONIC KIDNEY DISEASE, STAGE 3B: ICD-10-CM

## 2021-03-31 DIAGNOSIS — E87.0 HYPEROSMOLALITY AND HYPERNATREMIA: ICD-10-CM

## 2021-03-31 DIAGNOSIS — E86.0 DEHYDRATION: ICD-10-CM

## 2021-03-31 DIAGNOSIS — G89.3 NEOPLASM RELATED PAIN (ACUTE) (CHRONIC): ICD-10-CM

## 2021-03-31 DIAGNOSIS — Z66 DO NOT RESUSCITATE: ICD-10-CM

## 2021-03-31 DIAGNOSIS — Z79.01 LONG TERM (CURRENT) USE OF ANTICOAGULANTS: ICD-10-CM

## 2021-03-31 DIAGNOSIS — H40.9 UNSPECIFIED GLAUCOMA: ICD-10-CM

## 2021-03-31 DIAGNOSIS — M24.551 CONTRACTURE, RIGHT HIP: ICD-10-CM

## 2021-03-31 DIAGNOSIS — Z20.822 CONTACT WITH AND (SUSPECTED) EXPOSURE TO COVID-19: ICD-10-CM

## 2021-03-31 DIAGNOSIS — Z74.01 BED CONFINEMENT STATUS: ICD-10-CM

## 2021-03-31 DIAGNOSIS — M24.552 CONTRACTURE, LEFT HIP: ICD-10-CM

## 2021-03-31 DIAGNOSIS — I12.9 HYPERTENSIVE CHRONIC KIDNEY DISEASE WITH STAGE 1 THROUGH STAGE 4 CHRONIC KIDNEY DISEASE, OR UNSPECIFIED CHRONIC KIDNEY DISEASE: ICD-10-CM

## 2021-03-31 DIAGNOSIS — C55 MALIGNANT NEOPLASM OF UTERUS, PART UNSPECIFIED: ICD-10-CM

## 2021-03-31 DIAGNOSIS — C20 MALIGNANT NEOPLASM OF RECTUM: ICD-10-CM

## 2021-03-31 DIAGNOSIS — I95.9 HYPOTENSION, UNSPECIFIED: ICD-10-CM

## 2021-03-31 DIAGNOSIS — H54.3 UNQUALIFIED VISUAL LOSS, BOTH EYES: ICD-10-CM

## 2021-03-31 DIAGNOSIS — R53.2 FUNCTIONAL QUADRIPLEGIA: ICD-10-CM

## 2021-03-31 DIAGNOSIS — I34.0 NONRHEUMATIC MITRAL (VALVE) INSUFFICIENCY: ICD-10-CM

## 2021-03-31 DIAGNOSIS — Z51.5 ENCOUNTER FOR PALLIATIVE CARE: ICD-10-CM

## 2021-03-31 DIAGNOSIS — M24.562 CONTRACTURE, LEFT KNEE: ICD-10-CM

## 2021-03-31 DIAGNOSIS — Z53.29 PROCEDURE AND TREATMENT NOT CARRIED OUT BECAUSE OF PATIENT'S DECISION FOR OTHER REASONS: ICD-10-CM

## 2021-03-31 DIAGNOSIS — N17.9 ACUTE KIDNEY FAILURE, UNSPECIFIED: ICD-10-CM

## 2021-03-31 DIAGNOSIS — F03.91 UNSPECIFIED DEMENTIA WITH BEHAVIORAL DISTURBANCE: ICD-10-CM

## 2022-07-13 NOTE — PROGRESS NOTE ADULT - I WAS PHYSICALLY PRESENT FOR THE KEY PORTIONS OF THE EVALUATION AND MANAGEMENT (E/M) SERVICE PROVIDED.  I AGREE WITH THE ABOVE HISTORY, PHYSICAL, AND PLAN WHICH I HAVE REVIEWED AND EDITED WHERE APPROPRIATE
Statement Selected
Statement Selected
Xelirenez Pregnancy And Lactation Text: This medication is Pregnancy Category D and is not considered safe during pregnancy.  The risk during breast feeding is also uncertain.

## 2023-01-20 NOTE — ED ADULT NURSE NOTE - PRO INTERPRETER NEED 2
Called and spoke to pt's daughter, Merly(auth). Relayed PCP's message below. Daughter verbalized understanding. No further questions or concerns at this time. Lab appt 01/21/23 will be cancelled as requested.    English

## 2023-03-07 NOTE — ED PROVIDER NOTE - CPE EDP EYES NORM
normal... Clofazimine Counseling:  I discussed with the patient the risks of clofazimine including but not limited to skin and eye pigmentation, liver damage, nausea/vomiting, gastrointestinal bleeding and allergy.

## 2023-07-15 NOTE — PROGRESS NOTE ADULT - PROBLEM SELECTOR PROBLEM 4
You were seen in the emergency department for lower abdominal pain especially in your right lower quadrant that started 2 days ago. Lab work only remarkable for bacterial vaginosis. You received your first dose of antibiotics in the emergency department. We will send you on Flagyl. Please take as prescribed and complete the entire antibiotic course. Your CT abdomen pelvis showed large calcified fibroids which are likely causing your pain. No signs of appendicitis or other infection. Please follow-up with your OB/GYN early next week for further management. We will discharge you with a few doses of Roxicodone, Tylenol, and Motrin. Roxicodone can make you drowsy so please do not drive or operate machinery after taking this medication. Please follow-up with your primary care provider as needed. Please return to the emergency department if your pain worsens or you develop fever, chills, abnormal vaginal bleeding, nausea, or vomiting. Hypernatremia Acute kidney injury superimposed on CKD

## 2023-11-01 NOTE — PHYSICAL THERAPY INITIAL EVALUATION ADULT - LEVEL OF INDEPENDENCE: SIT/SUPINE, REHAB EVAL
Health Maintenance Due   Topic Date Due   • COVID-19 Vaccine (1) Never done   • Shingles Vaccine (1 of 2) Never done   • Pneumococcal Vaccine 65+ (2 - PPSV23 or PCV20) 03/12/2021   • Medicare Advantage- Medicare Wellness Visit  01/01/2023   • Depression Screening  08/31/2023   • Influenza Vaccine (1) 09/01/2023   • Colorectal Cancer Screen-  09/13/2023       Patient is due for topics listed above, she wishes to proceed with Colorectal Cancer Screening: iFOBT, Depression Screening  and MWV (Medicare Wellness Visit), but is not proceeding with Immunization(s) COVID-19, Influenza, Pneumococcal and Shingles at this time.    maximum assist (25% patients effort)

## 2024-03-06 NOTE — PATIENT PROFILE ADULT - NSPROEXTENSIONSOFSELF_GEN_A_NUR
DISPLAY PLAN FREE TEXT DISPLAY PLAN FREE TEXT DISPLAY PLAN FREE TEXT DISPLAY PLAN FREE TEXT DISPLAY PLAN FREE TEXT none DISPLAY PLAN FREE TEXT DISPLAY PLAN FREE TEXT DISPLAY PLAN FREE TEXT DISPLAY PLAN FREE TEXT DISPLAY PLAN FREE TEXT DISPLAY PLAN FREE TEXT DISPLAY PLAN FREE TEXT DISPLAY PLAN FREE TEXT DISPLAY PLAN FREE TEXT DISPLAY PLAN FREE TEXT

## 2024-03-18 NOTE — ED ADULT NURSE NOTE - DISTAL EXTREMITY COLOR
PT NO ANSWER IN WAITING ROOM X 3 ATTEMPTS. LWBS FORM NOT SIGNED BY PT, FORM IN CHART.   
color consistent with ethnicity/race

## 2024-04-08 NOTE — H&P ADULT - HISTORY OF PRESENT ILLNESS
PPI  Monitor GI symptoms   Patient is an 89 year old F with PMH MR, dementia, HTN, with noted recent admission for CAYLA and weakness, s/p dc to [facility] and return to home yesterday, who experienced fall last night. Patient states that she fell out of bed, did not strike her head or lose consciousness. She was able to walk throughout the day with her walker, however, when a visiting nurse came to house she was concerned about the patient's fall and possible need for additional social resources. 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 At [facility] ED T36.5, HR 77, 98/50, RR18 sat 95% RA. Labs with WBC 10.9, creatinine 1.7 (baseline ~1.2). Trop 0.02 (baseline). CT head and C-spine negative for fracture, moderate spondylosis of C spine. EKG sinus with PAC, nonspecific T changes. Covid negative. Got 500cc bolus in ED.

## 2024-08-17 NOTE — PROGRESS NOTE ADULT - PROBLEM SELECTOR PLAN 3
-Per Gyn, no indication for vaginal packing or transfusion at this time. Will continue to follow and attempt pelvic exam once patient amenable.   -Gyn onc will follow if there is tissue diagnosis proving malignancy.   -Maintain active type and screen [No Acute Distress] : no acute distress [Well Nourished] : well nourished [Well Developed] : well developed [Normal Sclera/Conjunctiva] : normal sclera/conjunctiva [PERRL] : pupils equal round and reactive to light [EOMI] : extraocular movements intact [Normal Outer Ear/Nose] : the outer ears and nose were normal in appearance [Normal Oropharynx] : the oropharynx was normal [Normal TMs] : both tympanic membranes were normal [Normal Nasal Mucosa] : the nasal mucosa was normal [No JVD] : no jugular venous distention [No Lymphadenopathy] : no lymphadenopathy [Supple] : supple [No Respiratory Distress] : no respiratory distress  [Clear to Auscultation] : lungs were clear to auscultation bilaterally [Normal Rate] : normal rate  [Regular Rhythm] : with a regular rhythm [Normal S1, S2] : normal S1 and S2 [No Carotid Bruits] : no carotid bruits [Pedal Pulses Present] : the pedal pulses are present [No Edema] : there was no peripheral edema [No Extremity Clubbing/Cyanosis] : no extremity clubbing/cyanosis [Normal Appearance] : normal in appearance [No Masses] : no palpable masses [No Nipple Discharge] : no nipple discharge [No Axillary Lymphadenopathy] : no axillary lymphadenopathy [Soft] : abdomen soft [Non Tender] : non-tender [Non-distended] : non-distended [Normal Bowel Sounds] : normal bowel sounds [Normal Supraclavicular Nodes] : no supraclavicular lymphadenopathy [Normal Axillary Nodes] : no axillary lymphadenopathy [Normal Posterior Cervical Nodes] : no posterior cervical lymphadenopathy [Normal Anterior Cervical Nodes] : no anterior cervical lymphadenopathy [No CVA Tenderness] : no CVA  tenderness [No Spinal Tenderness] : no spinal tenderness [No Joint Swelling] : no joint swelling [Grossly Normal Strength/Tone] : grossly normal strength/tone [No Rash] : no rash [Coordination Grossly Intact] : coordination grossly intact [No Focal Deficits] : no focal deficits [Normal Gait] : normal gait [Deep Tendon Reflexes (DTR)] : deep tendon reflexes were 2+ and symmetric [Speech Grossly Normal] : speech grossly normal [Normal Affect] : the affect was normal [Alert and Oriented x3] : oriented to person, place, and time [Normal Mood] : the mood was normal [de-identified] : Overweight male in stated age,

## 2025-03-04 NOTE — H&P ADULT - MINUTES
For information on Fall & Injury Prevention, visit: [email or web address] OR  [email or web address] OR  https://www.cdc.gov/steadi/patient.html 70

## 2025-03-10 NOTE — PROGRESS NOTE ADULT - PROBLEM SELECTOR PLAN 1
Patient with vaginal bleeding 2 months per niece, hemodynamically stable. On exam in ED, patient noted to be bleeding at vaginal introitus, however DHIRAJ negative. CTA/P concerning for uterine and rectal carcinoma. Unclear history regarding history of cancer, symptoms including weight loss, fatigue etc. 2/2 patient's dementia (baseline A&Ox1).   -Gynecology evaluated patient in ED - no indication for transfusion or vaginal packing at this time. Gyn onc will follow if there is tissue diagnosis proving malignancy.  - Benign gyn saw pt again 3/16, unable to perform pelvic exam due to contractures and pts mental status. Nothing to do at this time as bleeding is minimal. Contact GYN for vaginal bleeding >2 pads in 2hrs  - CEA and prolactin mildly elevated, remainder of tumor markers unremarkable  - Per conversation with patient's HCP (niece), pt is now DNR/DNI, made comfort care no...

## 2025-03-26 NOTE — ED ADULT NURSE NOTE - PERIPHERAL VASCULAR
- - -
Pt schedule for Right Total Knee Replacement on 4/1/25 with Dr. Galarza.     Labs drawn in PST - f/u results   pending Medical clearance     Pt was  instructed to stop aspirin/ecotrin and all over the counter medication including vitamins and herbal supplements one week prior to surgery   Instructions given on the use of 4% chlorhexidine wash and Pt verbalized understanding of same   Pt Instructed to have nothing by mouth starting midnight day before surgery  Patient is to expect a phone call day before surgery between the hours of 430- 630pm giving arrival time for surgery   Written and verbal preoperative instructions given to patient with understanding verbalized.     Patient today with STOP bang score 5

## 2025-07-06 NOTE — OCCUPATIONAL THERAPY INITIAL EVALUATION ADULT - LEVEL OF INDEPENDENCE: STAND/SIT, REHAB EVAL
minimum assist (75% patients effort)
Patient well-appearing no acute distress complaining of some left leg groin pain when walking patient has peripheral vascular disease and diabetes cardiologist was and requested CTA with runoff to assess arterial vasculature.  Which was done.  Of note her hemoglobin A1c was 12.  Patient is only on Ozempic for control of diabetes.  HEENT unremarkable heart sounds normal lungs clear abdomen soft nontender femoral pulses intact, popliteal pulses decreased dorsalis pedis pulses dopplerable but not palpable.  Extremities warm sensation intact